# Patient Record
Sex: MALE | Race: WHITE | NOT HISPANIC OR LATINO | Employment: UNEMPLOYED | URBAN - METROPOLITAN AREA
[De-identification: names, ages, dates, MRNs, and addresses within clinical notes are randomized per-mention and may not be internally consistent; named-entity substitution may affect disease eponyms.]

---

## 2024-01-08 ENCOUNTER — APPOINTMENT (OUTPATIENT)
Dept: GENERAL RADIOLOGY | Facility: HOSPITAL | Age: 34
End: 2024-01-08
Payer: COMMERCIAL

## 2024-01-08 ENCOUNTER — APPOINTMENT (OUTPATIENT)
Dept: ULTRASOUND IMAGING | Facility: HOSPITAL | Age: 34
End: 2024-01-08
Payer: COMMERCIAL

## 2024-01-08 ENCOUNTER — HOSPITAL ENCOUNTER (INPATIENT)
Facility: HOSPITAL | Age: 34
LOS: 9 days | Discharge: HOME | End: 2024-01-17
Attending: EMERGENCY MEDICINE | Admitting: INTERNAL MEDICINE
Payer: COMMERCIAL

## 2024-01-08 ENCOUNTER — APPOINTMENT (OUTPATIENT)
Dept: CT IMAGING | Facility: HOSPITAL | Age: 34
End: 2024-01-08
Payer: COMMERCIAL

## 2024-01-08 DIAGNOSIS — F19.10 IV DRUG ABUSE: ICD-10-CM

## 2024-01-08 DIAGNOSIS — R07.9 CHEST PAIN, UNSPECIFIED TYPE: ICD-10-CM

## 2024-01-08 DIAGNOSIS — R50.9 FEVER, UNSPECIFIED FEVER CAUSE: ICD-10-CM

## 2024-01-08 DIAGNOSIS — J90 LOCULATED PLEURAL EFFUSION: ICD-10-CM

## 2024-01-08 DIAGNOSIS — J90 PLEURAL EFFUSION: Primary | ICD-10-CM

## 2024-01-08 PROBLEM — F19.90 IVDU (INTRAVENOUS DRUG USER): Status: ACTIVE | Noted: 2024-01-08

## 2024-01-08 LAB
ALBUMIN SERPL-MCNC: 3.9 G/DL (ref 3.5–5.2)
ALBUMIN/GLOB SERPL: 1 G/DL
ALP SERPL-CCNC: 89 U/L (ref 39–117)
ALT SERPL W P-5'-P-CCNC: 76 U/L (ref 1–41)
AMPHET+METHAMPHET UR QL: NEGATIVE
AMPHETAMINES UR QL: NEGATIVE
ANION GAP SERPL CALCULATED.3IONS-SCNC: 10 MMOL/L (ref 5–15)
APPEARANCE FLD: ABNORMAL
AST SERPL-CCNC: 31 U/L (ref 1–40)
B PARAPERT DNA SPEC QL NAA+PROBE: NOT DETECTED
B PERT DNA SPEC QL NAA+PROBE: NOT DETECTED
BARBITURATES UR QL SCN: NEGATIVE
BASOPHILS # BLD AUTO: 0.05 10*3/MM3 (ref 0–0.2)
BASOPHILS NFR BLD AUTO: 0.3 % (ref 0–1.5)
BENZODIAZ UR QL SCN: NEGATIVE
BILIRUB SERPL-MCNC: 0.6 MG/DL (ref 0–1.2)
BUN SERPL-MCNC: 18 MG/DL (ref 6–20)
BUN/CREAT SERPL: 22 (ref 7–25)
BUPRENORPHINE SERPL-MCNC: NEGATIVE NG/ML
C PNEUM DNA NPH QL NAA+NON-PROBE: NOT DETECTED
CALCIUM SPEC-SCNC: 9 MG/DL (ref 8.6–10.5)
CANNABINOIDS SERPL QL: NEGATIVE
CHLORIDE SERPL-SCNC: 100 MMOL/L (ref 98–107)
CO2 SERPL-SCNC: 26 MMOL/L (ref 22–29)
COCAINE UR QL: NEGATIVE
COLOR FLD: YELLOW
CREAT SERPL-MCNC: 0.82 MG/DL (ref 0.76–1.27)
D DIMER PPP FEU-MCNC: 1.62 MCGFEU/ML (ref 0–0.5)
D-LACTATE SERPL-SCNC: 1.2 MMOL/L (ref 0.5–2)
DEPRECATED RDW RBC AUTO: 39.9 FL (ref 37–54)
EGFRCR SERPLBLD CKD-EPI 2021: 119 ML/MIN/1.73
EOSINOPHIL # BLD AUTO: 0.17 10*3/MM3 (ref 0–0.4)
EOSINOPHIL NFR BLD AUTO: 0.9 % (ref 0.3–6.2)
ERYTHROCYTE [DISTWIDTH] IN BLOOD BY AUTOMATED COUNT: 13 % (ref 12.3–15.4)
FENTANYL UR-MCNC: POSITIVE NG/ML
FLUAV SUBTYP SPEC NAA+PROBE: NOT DETECTED
FLUBV RNA ISLT QL NAA+PROBE: NOT DETECTED
GEN 5 2HR TROPONIN T REFLEX: <6 NG/L
GLOBULIN UR ELPH-MCNC: 4 GM/DL
GLUCOSE SERPL-MCNC: 112 MG/DL (ref 65–99)
HADV DNA SPEC NAA+PROBE: NOT DETECTED
HCOV 229E RNA SPEC QL NAA+PROBE: NOT DETECTED
HCOV HKU1 RNA SPEC QL NAA+PROBE: NOT DETECTED
HCOV NL63 RNA SPEC QL NAA+PROBE: NOT DETECTED
HCOV OC43 RNA SPEC QL NAA+PROBE: NOT DETECTED
HCT VFR BLD AUTO: 39.4 % (ref 37.5–51)
HGB BLD-MCNC: 13.1 G/DL (ref 13–17.7)
HMPV RNA NPH QL NAA+NON-PROBE: NOT DETECTED
HOLD SPECIMEN: NORMAL
HPIV1 RNA ISLT QL NAA+PROBE: NOT DETECTED
HPIV2 RNA SPEC QL NAA+PROBE: NOT DETECTED
HPIV3 RNA NPH QL NAA+PROBE: NOT DETECTED
HPIV4 P GENE NPH QL NAA+PROBE: NOT DETECTED
IMM GRANULOCYTES # BLD AUTO: 0.09 10*3/MM3 (ref 0–0.05)
IMM GRANULOCYTES NFR BLD AUTO: 0.5 % (ref 0–0.5)
LDH SERPL-CCNC: 240 U/L (ref 135–225)
LIPASE SERPL-CCNC: 14 U/L (ref 13–60)
LYMPHOCYTES # BLD AUTO: 2.35 10*3/MM3 (ref 0.7–3.1)
LYMPHOCYTES NFR BLD AUTO: 13.1 % (ref 19.6–45.3)
LYMPHOCYTES NFR FLD MANUAL: 8 %
M PNEUMO IGG SER IA-ACNC: NOT DETECTED
MCH RBC QN AUTO: 28.2 PG (ref 26.6–33)
MCHC RBC AUTO-ENTMCNC: 33.2 G/DL (ref 31.5–35.7)
MCV RBC AUTO: 84.9 FL (ref 79–97)
METHADONE UR QL SCN: POSITIVE
MONOCYTES # BLD AUTO: 0.93 10*3/MM3 (ref 0.1–0.9)
MONOCYTES NFR BLD AUTO: 5.2 % (ref 5–12)
MONOCYTES NFR FLD: 11 %
MRSA DNA SPEC QL NAA+PROBE: POSITIVE
NEUTROPHILS NFR BLD AUTO: 14.38 10*3/MM3 (ref 1.7–7)
NEUTROPHILS NFR BLD AUTO: 80 % (ref 42.7–76)
NEUTROPHILS NFR FLD MANUAL: 81 %
NRBC BLD AUTO-RTO: 0 /100 WBC (ref 0–0.2)
NT-PROBNP SERPL-MCNC: 78.6 PG/ML (ref 0–450)
OPIATES UR QL: POSITIVE
OXYCODONE UR QL SCN: NEGATIVE
PCP UR QL SCN: NEGATIVE
PH FLD: 7.44 [PH]
PLATELET # BLD AUTO: 278 10*3/MM3 (ref 140–450)
PMV BLD AUTO: 10.6 FL (ref 6–12)
POTASSIUM SERPL-SCNC: 3.9 MMOL/L (ref 3.5–5.2)
PROCALCITONIN SERPL-MCNC: 0.21 NG/ML (ref 0–0.25)
PROT SERPL-MCNC: 7.9 G/DL (ref 6–8.5)
RBC # BLD AUTO: 4.64 10*6/MM3 (ref 4.14–5.8)
RBC # FLD AUTO: 2000 /MM3
RHINOVIRUS RNA SPEC NAA+PROBE: NOT DETECTED
RSV RNA NPH QL NAA+NON-PROBE: NOT DETECTED
SARS-COV-2 RNA NPH QL NAA+NON-PROBE: NOT DETECTED
SODIUM SERPL-SCNC: 136 MMOL/L (ref 136–145)
TRICYCLICS UR QL SCN: NEGATIVE
TROPONIN T DELTA: NORMAL
TROPONIN T SERPL HS-MCNC: <6 NG/L
WBC # FLD AUTO: 5886 /MM3
WBC NRBC COR # BLD AUTO: 17.97 10*3/MM3 (ref 3.4–10.8)
WHOLE BLOOD HOLD COAG: NORMAL
WHOLE BLOOD HOLD SPECIMEN: NORMAL

## 2024-01-08 PROCEDURE — 87186 SC STD MICRODIL/AGAR DIL: CPT | Performed by: NURSE PRACTITIONER

## 2024-01-08 PROCEDURE — 0W993ZZ DRAINAGE OF RIGHT PLEURAL CAVITY, PERCUTANEOUS APPROACH: ICD-10-PCS | Performed by: RADIOLOGY

## 2024-01-08 PROCEDURE — 87075 CULTR BACTERIA EXCEPT BLOOD: CPT | Performed by: INTERNAL MEDICINE

## 2024-01-08 PROCEDURE — 87040 BLOOD CULTURE FOR BACTERIA: CPT | Performed by: NURSE PRACTITIONER

## 2024-01-08 PROCEDURE — 71045 X-RAY EXAM CHEST 1 VIEW: CPT

## 2024-01-08 PROCEDURE — 82945 GLUCOSE OTHER FLUID: CPT | Performed by: INTERNAL MEDICINE

## 2024-01-08 PROCEDURE — 25010000002 MORPHINE PER 10 MG: Performed by: PHYSICIAN ASSISTANT

## 2024-01-08 PROCEDURE — 25010000002 KETOROLAC TROMETHAMINE PER 15 MG: Performed by: NURSE PRACTITIONER

## 2024-01-08 PROCEDURE — G0378 HOSPITAL OBSERVATION PER HR: HCPCS

## 2024-01-08 PROCEDURE — 36415 COLL VENOUS BLD VENIPUNCTURE: CPT

## 2024-01-08 PROCEDURE — 76942 ECHO GUIDE FOR BIOPSY: CPT

## 2024-01-08 PROCEDURE — 83690 ASSAY OF LIPASE: CPT | Performed by: EMERGENCY MEDICINE

## 2024-01-08 PROCEDURE — 99222 1ST HOSP IP/OBS MODERATE 55: CPT | Performed by: INTERNAL MEDICINE

## 2024-01-08 PROCEDURE — 87206 SMEAR FLUORESCENT/ACID STAI: CPT | Performed by: INTERNAL MEDICINE

## 2024-01-08 PROCEDURE — 83880 ASSAY OF NATRIURETIC PEPTIDE: CPT | Performed by: EMERGENCY MEDICINE

## 2024-01-08 PROCEDURE — 25810000003 SODIUM CHLORIDE 0.9 % SOLUTION

## 2024-01-08 PROCEDURE — 80053 COMPREHEN METABOLIC PANEL: CPT | Performed by: EMERGENCY MEDICINE

## 2024-01-08 PROCEDURE — 87070 CULTURE OTHR SPECIMN AEROBIC: CPT | Performed by: INTERNAL MEDICINE

## 2024-01-08 PROCEDURE — 87116 MYCOBACTERIA CULTURE: CPT | Performed by: INTERNAL MEDICINE

## 2024-01-08 PROCEDURE — 25810000003 SODIUM CHLORIDE 0.9 % SOLUTION: Performed by: NURSE PRACTITIONER

## 2024-01-08 PROCEDURE — 25010000002 CEFTRIAXONE PER 250 MG: Performed by: INTERNAL MEDICINE

## 2024-01-08 PROCEDURE — 83605 ASSAY OF LACTIC ACID: CPT | Performed by: NURSE PRACTITIONER

## 2024-01-08 PROCEDURE — 83986 ASSAY PH BODY FLUID NOS: CPT | Performed by: INTERNAL MEDICINE

## 2024-01-08 PROCEDURE — 84484 ASSAY OF TROPONIN QUANT: CPT | Performed by: EMERGENCY MEDICINE

## 2024-01-08 PROCEDURE — 87150 DNA/RNA AMPLIFIED PROBE: CPT | Performed by: NURSE PRACTITIONER

## 2024-01-08 PROCEDURE — 25010000002 MORPHINE PER 10 MG: Performed by: INTERNAL MEDICINE

## 2024-01-08 PROCEDURE — 25010000002 KETOROLAC TROMETHAMINE PER 15 MG: Performed by: INTERNAL MEDICINE

## 2024-01-08 PROCEDURE — 84145 PROCALCITONIN (PCT): CPT | Performed by: NURSE PRACTITIONER

## 2024-01-08 PROCEDURE — 80307 DRUG TEST PRSMV CHEM ANLYZR: CPT | Performed by: NURSE PRACTITIONER

## 2024-01-08 PROCEDURE — 83615 LACTATE (LD) (LDH) ENZYME: CPT | Performed by: INTERNAL MEDICINE

## 2024-01-08 PROCEDURE — 25510000001 IOPAMIDOL PER 1 ML: Performed by: EMERGENCY MEDICINE

## 2024-01-08 PROCEDURE — 85025 COMPLETE CBC W/AUTO DIFF WBC: CPT | Performed by: EMERGENCY MEDICINE

## 2024-01-08 PROCEDURE — 87102 FUNGUS ISOLATION CULTURE: CPT | Performed by: INTERNAL MEDICINE

## 2024-01-08 PROCEDURE — 87040 BLOOD CULTURE FOR BACTERIA: CPT | Performed by: INTERNAL MEDICINE

## 2024-01-08 PROCEDURE — 85379 FIBRIN DEGRADATION QUANT: CPT | Performed by: NURSE PRACTITIONER

## 2024-01-08 PROCEDURE — 71275 CT ANGIOGRAPHY CHEST: CPT

## 2024-01-08 PROCEDURE — 0202U NFCT DS 22 TRGT SARS-COV-2: CPT | Performed by: NURSE PRACTITIONER

## 2024-01-08 PROCEDURE — 25010000002 VANCOMYCIN 10 G RECONSTITUTED SOLUTION

## 2024-01-08 PROCEDURE — 99285 EMERGENCY DEPT VISIT HI MDM: CPT

## 2024-01-08 PROCEDURE — 93005 ELECTROCARDIOGRAM TRACING: CPT

## 2024-01-08 PROCEDURE — 87147 CULTURE TYPE IMMUNOLOGIC: CPT | Performed by: NURSE PRACTITIONER

## 2024-01-08 PROCEDURE — 84157 ASSAY OF PROTEIN OTHER: CPT | Performed by: INTERNAL MEDICINE

## 2024-01-08 PROCEDURE — 87015 SPECIMEN INFECT AGNT CONCNTJ: CPT | Performed by: INTERNAL MEDICINE

## 2024-01-08 PROCEDURE — 87205 SMEAR GRAM STAIN: CPT | Performed by: INTERNAL MEDICINE

## 2024-01-08 PROCEDURE — 87154 CUL TYP ID BLD PTHGN 6+ TRGT: CPT | Performed by: NURSE PRACTITIONER

## 2024-01-08 PROCEDURE — 93005 ELECTROCARDIOGRAM TRACING: CPT | Performed by: EMERGENCY MEDICINE

## 2024-01-08 PROCEDURE — 89051 BODY FLUID CELL COUNT: CPT | Performed by: INTERNAL MEDICINE

## 2024-01-08 PROCEDURE — 87641 MR-STAPH DNA AMP PROBE: CPT | Performed by: INTERNAL MEDICINE

## 2024-01-08 RX ORDER — SODIUM CHLORIDE 0.9 % (FLUSH) 0.9 %
10 SYRINGE (ML) INJECTION AS NEEDED
Status: DISCONTINUED | OUTPATIENT
Start: 2024-01-08 | End: 2024-01-09

## 2024-01-08 RX ORDER — ACETAMINOPHEN 160 MG/5ML
650 SOLUTION ORAL EVERY 4 HOURS PRN
Status: DISCONTINUED | OUTPATIENT
Start: 2024-01-08 | End: 2024-01-17 | Stop reason: HOSPADM

## 2024-01-08 RX ORDER — ACETAMINOPHEN 650 MG/1
650 SUPPOSITORY RECTAL EVERY 4 HOURS PRN
Status: DISCONTINUED | OUTPATIENT
Start: 2024-01-08 | End: 2024-01-17 | Stop reason: HOSPADM

## 2024-01-08 RX ORDER — LIDOCAINE HYDROCHLORIDE 10 MG/ML
10 INJECTION, SOLUTION EPIDURAL; INFILTRATION; INTRACAUDAL; PERINEURAL ONCE
Status: COMPLETED | OUTPATIENT
Start: 2024-01-08 | End: 2024-01-08

## 2024-01-08 RX ORDER — ACETAMINOPHEN 325 MG/1
650 TABLET ORAL EVERY 4 HOURS PRN
Status: DISCONTINUED | OUTPATIENT
Start: 2024-01-08 | End: 2024-01-17 | Stop reason: HOSPADM

## 2024-01-08 RX ORDER — NAPROXEN 500 MG/1
500 TABLET ORAL 2 TIMES DAILY WITH MEALS
Status: ON HOLD | COMMUNITY
End: 2024-01-17 | Stop reason: SDUPTHER

## 2024-01-08 RX ORDER — SODIUM CHLORIDE 9 MG/ML
40 INJECTION, SOLUTION INTRAVENOUS AS NEEDED
Status: DISCONTINUED | OUTPATIENT
Start: 2024-01-08 | End: 2024-01-17 | Stop reason: HOSPADM

## 2024-01-08 RX ORDER — CHOLECALCIFEROL (VITAMIN D3) 125 MCG
5 CAPSULE ORAL NIGHTLY PRN
Status: DISCONTINUED | OUTPATIENT
Start: 2024-01-08 | End: 2024-01-09

## 2024-01-08 RX ORDER — SODIUM CHLORIDE 0.9 % (FLUSH) 0.9 %
10 SYRINGE (ML) INJECTION AS NEEDED
Status: DISCONTINUED | OUTPATIENT
Start: 2024-01-08 | End: 2024-01-17 | Stop reason: HOSPADM

## 2024-01-08 RX ORDER — LORAZEPAM 1 MG/1
1 TABLET ORAL ONCE
Status: COMPLETED | OUTPATIENT
Start: 2024-01-08 | End: 2024-01-08

## 2024-01-08 RX ORDER — BISACODYL 10 MG
10 SUPPOSITORY, RECTAL RECTAL DAILY PRN
Status: DISCONTINUED | OUTPATIENT
Start: 2024-01-08 | End: 2024-01-11 | Stop reason: SDUPTHER

## 2024-01-08 RX ORDER — ASPIRIN 81 MG/1
324 TABLET, CHEWABLE ORAL ONCE
Status: COMPLETED | OUTPATIENT
Start: 2024-01-08 | End: 2024-01-08

## 2024-01-08 RX ORDER — NICOTINE 21 MG/24HR
1 PATCH, TRANSDERMAL 24 HOURS TRANSDERMAL DAILY PRN
Status: DISCONTINUED | OUTPATIENT
Start: 2024-01-08 | End: 2024-01-17 | Stop reason: HOSPADM

## 2024-01-08 RX ORDER — METHADONE HYDROCHLORIDE 10 MG/1
90 TABLET ORAL DAILY
COMMUNITY

## 2024-01-08 RX ORDER — BISACODYL 5 MG/1
5 TABLET, DELAYED RELEASE ORAL DAILY PRN
Status: DISCONTINUED | OUTPATIENT
Start: 2024-01-08 | End: 2024-01-11 | Stop reason: SDUPTHER

## 2024-01-08 RX ORDER — KETOROLAC TROMETHAMINE 30 MG/ML
15 INJECTION, SOLUTION INTRAMUSCULAR; INTRAVENOUS EVERY 6 HOURS PRN
Status: DISPENSED | OUTPATIENT
Start: 2024-01-08 | End: 2024-01-10

## 2024-01-08 RX ORDER — KETOROLAC TROMETHAMINE 15 MG/ML
15 INJECTION, SOLUTION INTRAMUSCULAR; INTRAVENOUS ONCE
Status: COMPLETED | OUTPATIENT
Start: 2024-01-08 | End: 2024-01-08

## 2024-01-08 RX ORDER — LIDOCAINE 40 MG/G
1 CREAM TOPICAL AS NEEDED
COMMUNITY
End: 2024-01-28

## 2024-01-08 RX ORDER — MORPHINE SULFATE 4 MG/ML
4 INJECTION, SOLUTION INTRAMUSCULAR; INTRAVENOUS EVERY 4 HOURS PRN
Status: DISCONTINUED | OUTPATIENT
Start: 2024-01-08 | End: 2024-01-11

## 2024-01-08 RX ORDER — METHADONE HYDROCHLORIDE 10 MG/1
90 TABLET ORAL DAILY
Status: DISCONTINUED | OUTPATIENT
Start: 2024-01-09 | End: 2024-01-11

## 2024-01-08 RX ORDER — POLYETHYLENE GLYCOL 3350 17 G/17G
17 POWDER, FOR SOLUTION ORAL DAILY PRN
Status: DISCONTINUED | OUTPATIENT
Start: 2024-01-08 | End: 2024-01-11 | Stop reason: SDUPTHER

## 2024-01-08 RX ORDER — AMOXICILLIN 250 MG
2 CAPSULE ORAL 2 TIMES DAILY
Status: DISCONTINUED | OUTPATIENT
Start: 2024-01-08 | End: 2024-01-11 | Stop reason: SDUPTHER

## 2024-01-08 RX ORDER — OXYCODONE HYDROCHLORIDE 5 MG/1
5 TABLET ORAL EVERY 6 HOURS PRN
Status: DISCONTINUED | OUTPATIENT
Start: 2024-01-08 | End: 2024-01-11

## 2024-01-08 RX ORDER — SODIUM CHLORIDE 0.9 % (FLUSH) 0.9 %
10 SYRINGE (ML) INJECTION EVERY 12 HOURS SCHEDULED
Status: DISCONTINUED | OUTPATIENT
Start: 2024-01-08 | End: 2024-01-17 | Stop reason: HOSPADM

## 2024-01-08 RX ADMIN — Medication 5 MG: at 23:20

## 2024-01-08 RX ADMIN — MORPHINE SULFATE 4 MG: 4 INJECTION, SOLUTION INTRAMUSCULAR; INTRAVENOUS at 14:48

## 2024-01-08 RX ADMIN — KETOROLAC TROMETHAMINE 15 MG: 30 INJECTION INTRAMUSCULAR; INTRAVENOUS at 23:20

## 2024-01-08 RX ADMIN — KETOROLAC TROMETHAMINE 15 MG: 30 INJECTION INTRAMUSCULAR; INTRAVENOUS at 16:24

## 2024-01-08 RX ADMIN — IOPAMIDOL 90 ML: 755 INJECTION, SOLUTION INTRAVENOUS at 12:44

## 2024-01-08 RX ADMIN — ASPIRIN 324 MG: 81 TABLET, CHEWABLE ORAL at 07:55

## 2024-01-08 RX ADMIN — SODIUM CHLORIDE 1000 ML: 9 INJECTION, SOLUTION INTRAVENOUS at 08:38

## 2024-01-08 RX ADMIN — LORAZEPAM 1 MG: 1 TABLET ORAL at 11:19

## 2024-01-08 RX ADMIN — LIDOCAINE HYDROCHLORIDE 10 ML: 10 INJECTION, SOLUTION EPIDURAL; INFILTRATION; INTRACAUDAL; PERINEURAL at 16:17

## 2024-01-08 RX ADMIN — VANCOMYCIN HYDROCHLORIDE 2750 MG: 10 INJECTION, POWDER, LYOPHILIZED, FOR SOLUTION INTRAVENOUS at 22:50

## 2024-01-08 RX ADMIN — KETOROLAC TROMETHAMINE 15 MG: 15 INJECTION, SOLUTION INTRAMUSCULAR; INTRAVENOUS at 08:41

## 2024-01-08 RX ADMIN — MORPHINE SULFATE 4 MG: 4 INJECTION, SOLUTION INTRAMUSCULAR; INTRAVENOUS at 20:53

## 2024-01-08 RX ADMIN — CEFTRIAXONE 2000 MG: 2 INJECTION, POWDER, FOR SOLUTION INTRAMUSCULAR; INTRAVENOUS at 20:53

## 2024-01-08 NOTE — PROGRESS NOTES
"Pharmacy Consult-Vancomycin Dosing  Lion Solis is a  33 y.o. male receiving vancomycin therapy.     Indication: Suspected bacteremia/endocarditis  Consulting Provider: Luis PACHECO Consult: No    Goal AUC: 400 - 600 mg/L*hr    Current Antimicrobial Therapy  Anti-Infectives (From admission, onward)      Ordered     Dose/Rate Route Frequency Start Stop    01/08/24 1504  vancomycin 2750 mg/500 mL 0.9% NS IVPB ([facility])        Ordering Provider: Marito Cross RPH    20 mg/kg × 132 kg  over 165 Minutes Intravenous Once 01/08/24 1800      01/08/24 1401  cefTRIAXone (ROCEPHIN) 2,000 mg in sodium chloride 0.9 % 100 mL IVPB        Ordering Provider: Luis Larson DO    2,000 mg  200 mL/hr over 30 Minutes Intravenous Every 24 Hours 01/08/24 1700 01/15/24 1659    01/08/24 1401  Pharmacy to dose vancomycin        Ordering Provider: Luis Larson DO     Does not apply Continuous PRN 01/08/24 1600 01/15/24 1559            Allergies  Allergies as of 01/08/2024    (No Known Allergies)       Labs    Results from last 7 days   Lab Units 01/08/24  0838   BUN mg/dL 18   CREATININE mg/dL 0.82       Results from last 7 days   Lab Units 01/08/24  0814   WBC 10*3/mm3 17.97*       Evaluation of Dosing     Last Dose Received in the ED/Outside Facility: N/A  Is Patient on Dialysis or Renal Replacement: No    Ht - 175.3 cm (69\")  Wt - 132 kg (290 lb)    Estimated Creatinine Clearance: 172.5 mL/min (by C-G formula based on SCr of 0.82 mg/dL).    Intake & Output (last 3 days)       None            Microbiology and Radiology  Microbiology Results (last 10 days)       Procedure Component Value - Date/Time    COVID PRE-OP / PRE-PROCEDURE SCREENING ORDER (NO ISOLATION) - Swab, Nasopharynx [[id number]]  (Normal) Collected: 01/08/24 0805    Lab Status: Final result Specimen: Swab from Nasopharynx Updated: 01/08/24 0915    Narrative:      The following orders were created for panel order COVID PRE-OP / PRE-PROCEDURE SCREENING ORDER " (NO ISOLATION) - Swab, Nasopharynx.  Procedure                               Abnormality         Status                     ---------                               -----------         ------                     Respiratory Panel PCR w/...[[id number]]  Normal              Final result                 Please view results for these tests on the individual orders.    Respiratory Panel PCR w/COVID-19(SARS-CoV-2) VERONICA/MARCELO/ISIDRO/PAD/COR/JOHN In-House, NP Swab in UTM/VTM, 2 HR TAT - Swab, Nasopharynx [[id number]]  (Normal) Collected: 01/08/24 0805    Lab Status: Final result Specimen: Swab from Nasopharynx Updated: 01/08/24 0915     ADENOVIRUS, PCR Not Detected     Coronavirus 229E Not Detected     Coronavirus HKU1 Not Detected     Coronavirus NL63 Not Detected     Coronavirus OC43 Not Detected     COVID19 Not Detected     Human Metapneumovirus Not Detected     Human Rhinovirus/Enterovirus Not Detected     Influenza A PCR Not Detected     Influenza B PCR Not Detected     Parainfluenza Virus 1 Not Detected     Parainfluenza Virus 2 Not Detected     Parainfluenza Virus 3 Not Detected     Parainfluenza Virus 4 Not Detected     RSV, PCR Not Detected     Bordetella pertussis pcr Not Detected     Bordetella parapertussis PCR Not Detected     Chlamydophila pneumoniae PCR Not Detected     Mycoplasma pneumo by PCR Not Detected    Narrative:      In the setting of a positive respiratory panel with a viral infection PLUS a negative procalcitonin without other underlying concern for bacterial infection, consider observing off antibiotics or discontinuation of antibiotics and continue supportive care. If the respiratory panel is positive for atypical bacterial infection (Bordetella pertussis, Chlamydophila pneumoniae, or Mycoplasma pneumoniae), consider antibiotic de-escalation to target atypical bacterial infection.            Reported Vancomycin Levels                     [employer] AUC Calculation:    Current AUC:   N/A mg/L*hr    Predicted  Steady State AUC on Current Dose: N/A mg/L*hr  _________________________________    Predicted Steady State AUC on New Dose:   448 mg/L*hr    Assessment/Plan:  Pharmacy to dose vancomycin for suspected bacteremia/endocarditis.  Will load with 2750 mg (~20.8 mg/kg), followed by 1250 mg (9.5 mg/kg) Q12H.    Pharmacy will follow patient status, labs, and new drug-drug interactions and adjust regimen accordingly.    Thanks,  Marito Cross, PharmD  1/8/2024 15:10 EST

## 2024-01-08 NOTE — ED NOTES
Lion Solis    Nursing Report ED to Floor:  Mental status: aox4  Ambulatory status: has not walked since being here  Oxygen Therapy:  2l nc  Cardiac Rhythm: nsr to tachy  Admitted from: er  Safety Concerns:  fall risk  Social Issues: IVDU  ED Room #:  5    ED Nurse Phone Extension - [phone] or may call [phone].      HPI:   Chief Complaint   Patient presents with    Chest Pain    Shortness of Breath       Past Medical History:  Past Medical History:   Diagnosis Date    MRSA infection         Past Surgical History:  History reviewed. No pertinent surgical history.     Admitting Doctor:   No admitting provider for patient encounter.    Consulting Provider(s):  Consults       No orders found from 12/10/2023 to 1/9/2024.             Admitting Diagnosis:   There were no encounter diagnoses.    Most Recent Vitals:   Vitals:    01/08/24 1145 01/08/24 1200 01/08/24 1215 01/08/24 1230   BP:       Pulse: 85 86 87 86   Resp:       Temp:       TempSrc:       SpO2: 90% 90% 92% 92%   Weight:       Height:           Active LDAs/IV Access:   Lines, Drains & Airways       Active LDAs       Name Placement date Placement time Site Days    Peripheral IV 01/08/24 0836 Anterior;Left Forearm 01/08/24  0836  Forearm  less than 1                    Labs (abnormal labs have a star):   Labs Reviewed   COMPREHENSIVE METABOLIC PANEL - Abnormal; Notable for the following components:       Result Value    Glucose 112 (*)     ALT (SGPT) 76 (*)     All other components within normal limits    Narrative:     GFR Normal >60  Chronic Kidney Disease <60  Kidney Failure <15     CBC WITH AUTO DIFFERENTIAL - Abnormal; Notable for the following components:    WBC 17.97 (*)     Neutrophil % 80.0 (*)     Lymphocyte % 13.1 (*)     Neutrophils, Absolute 14.38 (*)     Monocytes, Absolute 0.93 (*)     Immature Grans, Absolute 0.09 (*)     All other components within normal limits   D-DIMER, QUANTITATIVE - Abnormal; Notable for the following components:    D-Dimer,  "Quantitative 1.62 (*)     All other components within normal limits    Narrative:     According to the assay 's published package insert, a normal (<0.50 MCGFEU/mL) D-dimer result in conjunction with a non-high clinical probability assessment, excludes deep vein thrombosis (DVT) and pulmonary embolism (PE) with high sensitivity.    D-dimer values increase with age and this can make VTE exclusion of an older population difficult. To address this, the [employer], based on best available evidence and recent guidelines, recommends that clinicians use age-adjusted D-dimer thresholds in patients greater than 50 years of age with: a) a low probability of PE who do not meet all Pulmonary Embolism Rule Out Criteria, or b) in those with intermediate probability of PE.   The formula for an age-adjusted D-dimer cut-off is \"age/100\".  For example, a 60 year old patient would have an age-adjusted cut-off of 0.60 MCGFEU/mL and an 80 year old 0.80 MCGFEU/mL.   URINE DRUG SCREEN - Abnormal; Notable for the following components:    Opiate Screen Positive (*)     Methadone Screen, Urine Positive (*)     All other components within normal limits    Narrative:     Cutoff For Drugs Screened:    Amphetamines               500 ng/ml  Barbiturates               200 ng/ml  Benzodiazepines            150 ng/ml  Cocaine                    150 ng/ml  Methadone                  200 ng/ml  Opiates                    100 ng/ml  Phencyclidine               25 ng/ml  THC                         50 ng/ml  Methamphetamine            500 ng/ml  Tricyclic Antidepressants  300 ng/ml  Oxycodone                  100 ng/ml  Buprenorphine               10 ng/ml    The normal value for all drugs tested is negative. This report includes unconfirmed screening results, with the cutoff values listed, to be used for medical treatment purposes only.  Unconfirmed results must not be used for non-medical purposes such as employment or " legal testing.  Clinical consideration should be applied to any drug of abuse test, particularly when unconfirmed results are used.     RESPIRATORY PANEL PCR W/ COVID-19 (SARS-COV-2), NP SWAB IN UTM/VTP, 2 HR TAT - Normal    Narrative:     In the setting of a positive respiratory panel with a viral infection PLUS a negative procalcitonin without other underlying concern for bacterial infection, consider observing off antibiotics or discontinuation of antibiotics and continue supportive care. If the respiratory panel is positive for atypical bacterial infection (Bordetella pertussis, Chlamydophila pneumoniae, or Mycoplasma pneumoniae), consider antibiotic de-escalation to target atypical bacterial infection.   TROPONIN - Normal    Narrative:     High Sensitive Troponin T Reference Range:  <14.0 ng/L- Negative Female for AMI  <22.0 ng/L- Negative Male for AMI  >=14 - Abnormal Female indicating possible myocardial injury.  >=22 - Abnormal Male indicating possible myocardial injury.   Clinicians would have to utilize clinical acumen, EKG, Troponin, and serial changes to determine if it is an Acute Myocardial Infarction or myocardial injury due to an underlying chronic condition.        LIPASE - Normal   BNP (IN-HOUSE) - Normal    Narrative:     This assay is used as an aid in the diagnosis of individuals suspected of having heart failure. It can be used as an aid in the diagnosis of acute decompensated heart failure (ADHF) in patients presenting with signs and symptoms of ADHF to the emergency department (ED). In addition, NT-proBNP of <300 pg/mL indicates ADHF is not likely.    Age Range Result Interpretation  NT-proBNP Concentration (pg/mL:      <50             Positive            >450                   Gray                 300-450                    Negative             <300    50-75           Positive            >900                  Gray                300-900                  Negative            <300      >75       "       Positive            >1800                  Gray                300-1800                  Negative            <300   LACTIC ACID, PLASMA - Normal   PROCALCITONIN - Normal    Narrative:     As a Marker for Sepsis (Non-Neonates):    1. <0.5 ng/mL represents a low risk of severe sepsis and/or septic shock.  2. >2 ng/mL represents a high risk of severe sepsis and/or septic shock.    As a Marker for Lower Respiratory Tract Infections that require antibiotic therapy:    PCT on Admission    Antibiotic Therapy       6-12 Hrs later    >0.5                Strongly Recommended  >0.25 - <0.5        Recommended   0.1 - 0.25          Discouraged              Remeasure/reassess PCT  <0.1                Strongly Discouraged     Remeasure/reassess PCT    As 28 day mortality risk marker: \"Change in Procalcitonin Result\" (>80% or <=80%) if Day 0 (or Day 1) and Day 4 values are available. Refer to http://www.[employer]pct-calculator.com    Change in PCT <=80%  A decrease of PCT levels below or equal to 80% defines a positive change in PCT test result representing a higher risk for 28-day all-cause mortality of patients diagnosed with severe sepsis for septic shock.    Change in PCT >80%  A decrease of PCT levels of more than 80% defines a negative change in PCT result representing a lower risk for 28-day all-cause mortality of patients diagnosed with severe sepsis or septic shock.      COVID PRE-OP / PRE-PROCEDURE SCREENING ORDER (NO ISOLATION)    Narrative:     The following orders were created for panel order COVID PRE-OP / PRE-PROCEDURE SCREENING ORDER (NO ISOLATION) - Swab, Nasopharynx.  Procedure                               Abnormality         Status                     ---------                               -----------         ------                     Respiratory Panel PCR w/...[[id number]]  Normal              Final result                 Please view results for these tests on the individual orders.   BLOOD CULTURE "   BLOOD CULTURE   RAINBOW DRAW    Narrative:     The following orders were created for panel order Drew Draw.  Procedure                               Abnormality         Status                     ---------                               -----------         ------                     Green Top (Gel)[[id number]]                                  Final result               Lavender Top[[id number]]                                     Final result               Gold Top - SST[[id number]]                                   Final result               Rain Top[[id number]]                                         Final result               Light Blue Top[[id number]]                                   Final result                 Please view results for these tests on the individual orders.   HIGH SENSITIVITIY TROPONIN T 2HR    Narrative:     High Sensitive Troponin T Reference Range:  <14.0 ng/L- Negative Female for AMI  <22.0 ng/L- Negative Male for AMI  >=14 - Abnormal Female indicating possible myocardial injury.  >=22 - Abnormal Male indicating possible myocardial injury.   Clinicians would have to utilize clinical acumen, EKG, Troponin, and serial changes to determine if it is an Acute Myocardial Infarction or myocardial injury due to an underlying chronic condition.        FENTANYL, URINE   PROTIME-INR   CBC AND DIFFERENTIAL    Narrative:     The following orders were created for panel order CBC & Differential.  Procedure                               Abnormality         Status                     ---------                               -----------         ------                     CBC Auto Differential[[id number]]        Abnormal            Final result                 Please view results for these tests on the individual orders.   GREEN TOP   LAVENDER TOP   GOLD TOP - SST   GRAY TOP   LIGHT BLUE TOP       Meds Given in ED:   Medications   sodium chloride 0.9 % flush 10 mL (has no administration in time range)   sodium  chloride 0.9 % flush 10 mL (has no administration in time range)   Hold medication (has no administration in time range)   aspirin chewable tablet 324 mg (324 mg Oral Given 1/8/24 0755)   sodium chloride 0.9 % bolus 1,000 mL (0 mL Intravenous Stopped 1/8/24 1030)   ketorolac (TORADOL) injection 15 mg (15 mg Intravenous Given 1/8/24 0841)   LORazepam (ATIVAN) tablet 1 mg (1 mg Oral Given 1/8/24 1119)   iopamidol (ISOVUE-370) 76 % injection 100 mL (90 mL Intravenous Given 1/8/24 1244)     hold, 1 each

## 2024-01-08 NOTE — H&P
"    [facility] Medicine Services  HISTORY AND PHYSICAL    Patient Name: Lion Solis  : 1990  MRN: [id number]  Primary Care Physician: Provider, No Known  Date of admission: 2024      Subjective   Subjective     Chief Complaint:  Chest pain    HPI:  Lion Solis is a 33 y.o. male w/ obesity, chronic methadone use, IVDU w/ fentanyl (occasionally re-uses needles but does not share needles), vaping (commercially bought nicotine products) who presents for eval of right sided chest pain. He reports recent multiple weeks of upper respiratory illness that resolved ~10 days ago. Then ~5 days ago he developed right sided chest pain, was seen at [facility] w/ temp of 101 orally, was diagnosed w/ a muscle strain, and was given a \"steroid shot\" and naproxen. His pain worsened 2-3 days ago and has become so severe he presented to the ED today. He has RT sided sharp chest pain worse with deep breathing, no sputum production, but does have associated intermittent sweats.      Personal History     Past Medical History:   Diagnosis Date    MRSA infection            History reviewed. No pertinent surgical history.    Family History: family history is not on file.     Social History:  reports that he has been smoking electronic cigarette. He has never used smokeless tobacco. He reports current drug use. Drug: IV.  Social History     Social History Narrative    Not on file       Medications:  Available home medication information reviewed.  lidocaine, methadone, and naproxen    No Known Allergies    Objective   Objective     Vital Signs:   Temp:  [99.5 °F (37.5 °C)] 99.5 °F (37.5 °C)  Heart Rate:  [] 86  Resp:  [24] 24  BP: (127-145)/(70-89) 128/70  Flow (L/min):  [2] 2       Physical Exam   Constitutional: Awake, alert, laying in bed in NAD, appears uncomfortable  HENT: NCAT, mucous membranes moist  Respiratory: Clear to auscultation bilaterally, shallow breathing   Cardiovascular: RRR, no murmurs " appreciated  Gastrointestinal: Positive bowel sounds, soft, nontender, nondistended  Musculoskeletal: No LE edema  Psychiatric: Appropriate affect, cooperative  Neurologic: Oriented x 3, moving all extremities equally    Result Review:  I have personally reviewed the results from the time of this admission to 1/8/2024 14:01 EST and agree with these findings:  []  Laboratory list / accordion  []  Microbiology  [x]  Radiology  []  EKG/Telemetry   []  Cardiology/Vascular   []  Pathology  []  Old records  []  Other:  Most notable findings include: CT chest w/ loculated effusions      LAB RESULTS:      Lab 01/08/24  0838 01/08/24  0828 01/08/24 0814   WBC  --   --  17.97*   HEMOGLOBIN  --   --  13.1   HEMATOCRIT  --   --  39.4   PLATELETS  --   --  278   NEUTROS ABS  --   --  14.38*   IMMATURE GRANS (ABS)  --   --  0.09*   LYMPHS ABS  --   --  2.35   MONOS ABS  --   --  0.93*   EOS ABS  --   --  0.17   MCV  --   --  84.9   PROCALCITONIN 0.21  --   --    LACTATE  --  1.2  --    D DIMER QUANT  --   --  1.62*         Lab 01/08/24 0838   SODIUM 136   POTASSIUM 3.9   CHLORIDE 100   CO2 26.0   ANION GAP 10.0   BUN 18   CREATININE 0.82   EGFR 119.0   GLUCOSE 112*   CALCIUM 9.0         Lab 01/08/24 0838   TOTAL PROTEIN 7.9   ALBUMIN 3.9   GLOBULIN 4.0   ALT (SGPT) 76*   AST (SGOT) 31   BILIRUBIN 0.6   ALK PHOS 89   LIPASE 14         Lab 01/08/24  1038 01/08/24 0838   PROBNP  --  78.6   HSTROP T <6 <6                     Microbiology Results (last 10 days)       Procedure Component Value - Date/Time    COVID PRE-OP / PRE-PROCEDURE SCREENING ORDER (NO ISOLATION) - Swab, Nasopharynx [[id number]]  (Normal) Collected: 01/08/24 0805    Lab Status: Final result Specimen: Swab from Nasopharynx Updated: 01/08/24 0915    Narrative:      The following orders were created for panel order COVID PRE-OP / PRE-PROCEDURE SCREENING ORDER (NO ISOLATION) - Swab, Nasopharynx.  Procedure                               Abnormality         Status                      ---------                               -----------         ------                     Respiratory Panel PCR w/...[[id number]]  Normal              Final result                 Please view results for these tests on the individual orders.    Respiratory Panel PCR w/COVID-19(SARS-CoV-2) VERONICA/MARCELO/ISIDRO/PAD/COR/JOHN In-House, NP Swab in UTM/VTM, 2 HR TAT - Swab, Nasopharynx [[id number]]  (Normal) Collected: 01/08/24 0805    Lab Status: Final result Specimen: Swab from Nasopharynx Updated: 01/08/24 0915     ADENOVIRUS, PCR Not Detected     Coronavirus 229E Not Detected     Coronavirus HKU1 Not Detected     Coronavirus NL63 Not Detected     Coronavirus OC43 Not Detected     COVID19 Not Detected     Human Metapneumovirus Not Detected     Human Rhinovirus/Enterovirus Not Detected     Influenza A PCR Not Detected     Influenza B PCR Not Detected     Parainfluenza Virus 1 Not Detected     Parainfluenza Virus 2 Not Detected     Parainfluenza Virus 3 Not Detected     Parainfluenza Virus 4 Not Detected     RSV, PCR Not Detected     Bordetella pertussis pcr Not Detected     Bordetella parapertussis PCR Not Detected     Chlamydophila pneumoniae PCR Not Detected     Mycoplasma pneumo by PCR Not Detected    Narrative:      In the setting of a positive respiratory panel with a viral infection PLUS a negative procalcitonin without other underlying concern for bacterial infection, consider observing off antibiotics or discontinuation of antibiotics and continue supportive care. If the respiratory panel is positive for atypical bacterial infection (Bordetella pertussis, Chlamydophila pneumoniae, or Mycoplasma pneumoniae), consider antibiotic de-escalation to target atypical bacterial infection.            CT Angiogram Chest    Result Date: 1/8/2024  CT ANGIOGRAM CHEST Date of Exam: 1/8/2024 12:29 PM EST Indication: chest pain, SOA, elevated d-dimer. r/o PE, infection. known IV drug user. Comparison: None available. Technique:  CTA of the chest was performed after the uneventful intravenous administration of 85 mL Isovue-370. Reconstructed coronal and sagittal images were also obtained. In addition, a 3-D volume rendered image was created for interpretation. Automated exposure control and iterative reconstruction methods were used. Findings: There is relatively more dense contrast in the SVC and thoracic aorta as compared to the pulmonary arteries and their branches. No large central embolism is identified although peripheral branches are not adequately evaluated on this exam. There are no enlarged mediastinal nodes. There are calcified hilar nodes. There is a small partially loculated right pleural effusion. There is no left pleural effusion. There is compressive atelectasis of portions of the right middle and right lower lobes. There is triangular atelectasis of the left lower lobe.     Impression: Impression: Exam is not adequate for detection of pulmonary embolism. Repeat scanning should be considered if clinically warranted. Small loculated right pleural effusion. Areas of compressive atelectasis of the right middle and right lower lobes, and mild atelectasis of the left lower lobe. Electronically Signed: Reyna Caldwell MD  1/8/2024 12:55 PM EST  Workstation ID: [id number]    XR Chest 1 View    Result Date: 1/8/2024  XR CHEST 1 VW Date of Exam: 1/8/2024 8:26 AM EST Indication: Chest Pain Triage Protocol Comparison: None available. Findings: Lung volumes are diminished and there are perihilar interstitial opacities suggesting pulmonary edema pattern. Small bilateral pleural effusions are evident, greater on the right. There is no distinct pneumothorax. The heart appears mildly enlarged.     Impression: Impression: Hypoventilatory changes and evidence of likely pulmonary edema with small bilateral pleural effusions. Electronically Signed: Gio Marte MD  1/8/2024 8:47 AM EST  Workstation ID: [id number]         Assessment & Plan    Assessment & Plan       Loculated pleural effusion    IVDU (intravenous drug user)    Summary: This is a 32 y/o male w/ obesity, chronic methadone use (follows w/ clinic), IVDU w/ fentanyl, vaping, who presented w/ RT sided pleuritic chest pain and recent fever 101F orally, imaging was concerning for loculated RT pleural effusions    Assessment/Plan    RT pleuritic chest pain w/ loculated effusions  Concern for bacteremia/endocarditis  -HS troponins negative, lipase WNL, CTA w/o large vessel PE (cannot rule out smaller vessels due to contrast timing)  -sometimes re-uses needles but does not share needles  -blood cultures obtained in the ED, will obtain another set later today  -IR guided thoracentesis ordered for today w/ cultures  -check HIV status  -check TTE  -leukocytosis difficult to interpret w/ recent parenteral steroid at [facility]  -start Vanc/CTX, timed for after thoracentesis to maximize culture data (he is non-toxic currently)  -ordered home methadone 90mg daily (pharmacy working to verify), pain control will be difficult, prn toradol, oxycodone, morphine  -if blood cultures positive or TTE shows evidence for endocarditis, anticipate ID consult    Chronic methadone use  IVDU w/ fentanyl  -home methadone 90mg as noted (follows with a clinic), opiate addiction consult for in hospital assistance, other pain control as noted    Nicotine use  -vapes commercially produced nicotine products, ordered NRT while inpatient    DVT prophylaxis:    DVT prophylaxis:  No DVT prophylaxis order currently exists.      CODE STATUS:    Code Status and Medical Interventions:   Ordered at: 01/08/24 1355     Code Status (Patient has no pulse and is not breathing):    CPR (Attempt to Resuscitate)     Medical Interventions (Patient has pulse or is breathing):    Full Support       Expected Discharge   Expected Discharge Date: 1/12/2024; Expected Discharge Time:      Luis Larson DO  01/08/24

## 2024-01-08 NOTE — ED PROVIDER NOTES
EMERGENCY DEPARTMENT ENCOUNTER    Pt Name: Lion Solis  MRN: [id number]  Pt :   1990  Room Number:  N206/1  Date of encounter:  2024  PCP: Provider, No Known  ED Provider: FARRAH Mays    Historian: Patient    HPI:  Chief Complaint: chest pain    Context: Lion Solis is a 33 y.o. male who presents to the ED c/o chest pain.  Patient reports that the pain in his chest started 3 days ago.  Patient thought he had pulled a muscle.  Patient was seen at the urgent care and given Toradol.  Toradol helped initially.  He was discharged home with Tylenol and lidocaine patches.  Patient has had intermittent fevers.  Fevers up to 102.  He has had a cough over the past 3 to 4 weeks.  He has had intermittent episodes of shortness of breath.  He complains of pain on the right side of his chest that radiates directly into his back.  Patient is an IV drug abuser.  He denies any cardiac history.  He reports he has never had a stress test or cardiac cath.  He denies any recent travel or recent immobilization.  HPI     REVIEW OF SYSTEMS  A chief complaint appropriate review of systems was completed and is negative except as noted in the HPI.     PAST MEDICAL HISTORY  Past Medical History:   Diagnosis Date    MRSA infection        PAST SURGICAL HISTORY  Past Surgical History:   Procedure Laterality Date    BRONCHOSCOPY THORACOTOMY Right 2024    Procedure: THORACOTOMY WITH DECORTICATION;  Surgeon: Douglas Vargas MD;  Location: [facility];  Service: Cardiothoracic;  Laterality: Right;       FAMILY HISTORY  History reviewed. No pertinent family history.    SOCIAL HISTORY  Social History     Socioeconomic History    Marital status:    Tobacco Use    Smoking status: Every Day     Types: Electronic Cigarette    Smokeless tobacco: Never   Vaping Use    Vaping Use: Unknown   Substance and Sexual Activity    Drug use: Yes     Types: IV     Comment: fentanyl       ALLERGIES  Patient has no known  allergies.    PHYSICAL EXAM  Physical Exam  Vitals and nursing note reviewed.   Constitutional:       General: He is in acute distress.      Appearance: He is obese. He is ill-appearing.   HENT:      Head: Normocephalic and atraumatic.   Eyes:      Pupils: Pupils are equal, round, and reactive to light.   Cardiovascular:      Rate and Rhythm: Regular rhythm. Tachycardia present.      Heart sounds: Normal heart sounds.   Pulmonary:      Breath sounds: No decreased breath sounds, wheezing or rhonchi.   Chest:      Chest wall: Tenderness (rt side of chest) present.   Musculoskeletal:         General: Normal range of motion.      Cervical back: Normal range of motion.      Right lower leg: No tenderness. No edema.      Left lower leg: No tenderness. No edema.   Skin:     General: Skin is warm and dry.   Neurological:      General: No focal deficit present.      Mental Status: He is alert and oriented to person, place, and time.   Psychiatric:         Mood and Affect: Mood is anxious.           LAB RESULTS  Results for orders placed or performed during the hospital encounter of 01/08/24   Blood Culture - Blood, Arm, Left    Specimen: Arm, Left; Blood   Result Value Ref Range    Blood Culture Staphylococcus epidermidis (C)     Isolated from Anaerobic Bottle     Gram Stain Anaerobic Bottle Gram positive cocci in clusters (C)        Susceptibility    Staphylococcus epidermidis - JUANJO     Oxacillin  Susceptible ug/ml     Vancomycin  Susceptible ug/ml   Blood Culture - Blood, Arm, Left    Specimen: Arm, Left; Blood   Result Value Ref Range    Blood Culture No growth at 5 days    Respiratory Panel PCR w/COVID-19(SARS-CoV-2) VERONICA/MARCELO/ISIDRO/PAD/COR/JOHN In-House, NP Swab in UTM/VTM, 2 HR TAT - Swab, Nasopharynx    Specimen: Nasopharynx; Swab   Result Value Ref Range    ADENOVIRUS, PCR Not Detected Not Detected    Coronavirus 229E Not Detected Not Detected    Coronavirus HKU1 Not Detected Not Detected    Coronavirus NL63 Not Detected  Not Detected    Coronavirus OC43 Not Detected Not Detected    COVID19 Not Detected Not Detected - Ref. Range    Human Metapneumovirus Not Detected Not Detected    Human Rhinovirus/Enterovirus Not Detected Not Detected    Influenza A PCR Not Detected Not Detected    Influenza B PCR Not Detected Not Detected    Parainfluenza Virus 1 Not Detected Not Detected    Parainfluenza Virus 2 Not Detected Not Detected    Parainfluenza Virus 3 Not Detected Not Detected    Parainfluenza Virus 4 Not Detected Not Detected    RSV, PCR Not Detected Not Detected    Bordetella pertussis pcr Not Detected Not Detected    Bordetella parapertussis PCR Not Detected Not Detected    Chlamydophila pneumoniae PCR Not Detected Not Detected    Mycoplasma pneumo by PCR Not Detected Not Detected   MRSA Screen, PCR (Inpatient) - Swab, Nares    Specimen: Nares; Swab   Result Value Ref Range    MRSA PCR Positive (A) Negative   Blood Culture - Blood, Arm, Right    Specimen: Arm, Right; Blood   Result Value Ref Range    Blood Culture No growth at 4 days    Blood Culture - Blood, Hand, Left    Specimen: Hand, Left; Blood   Result Value Ref Range    Blood Culture No growth at 4 days    AFB Culture - Body Fluid, Pleural Cavity    Specimen: Pleural Cavity; Body Fluid   Result Value Ref Range    AFB Stain No acid fast bacilli seen on concentrated smear    Body Fluid Culture - Body Fluid, Pleural Cavity    Specimen: Pleural Cavity; Body Fluid   Result Value Ref Range    Body Fluid Culture No growth at 3 days     Gram Stain Few (2+) WBCs seen     Gram Stain Few (2+) Red blood cells     Gram Stain No organisms seen    Anaerobic Culture - Pleural Fluid, Pleural Cavity    Specimen: Pleural Cavity; Pleural Fluid   Result Value Ref Range    Anaerobic Culture No anaerobes isolated at 5 days    Fungus Smear - Body Fluid, Pleural Cavity    Specimen: Pleural Cavity; Body Fluid   Result Value Ref Range    Fungal Stain No fungal elements seen    Blood Culture ID, PCR -  Blood, Arm, Left    Specimen: Arm, Left; Blood   Result Value Ref Range    BCID, PCR (A) Negative by BCID PCR. Culture to Follow.     Staph spp, not aureus or lugdunensis. Identification by BCID2 PCR.    BOTTLE TYPE Anaerobic Bottle    Tissue / Bone Culture - Tissue, Pleural Cavity    Specimen: Pleural Cavity; Tissue   Result Value Ref Range    Tissue Culture No growth at 2 days     Gram Stain Few (2+) WBCs seen     Gram Stain No organisms seen    AFB Culture - Tissue, Pleural Cavity    Specimen: Pleural Cavity; Tissue   Result Value Ref Range    AFB Stain No acid fast bacilli seen on concentrated smear    High Sensitivity Troponin T    Specimen: Blood   Result Value Ref Range    HS Troponin T <6 <22 ng/L   Comprehensive Metabolic Panel    Specimen: Blood   Result Value Ref Range    Glucose 112 (H) 65 - 99 mg/dL    BUN 18 6 - 20 mg/dL    Creatinine 0.82 0.76 - 1.27 mg/dL    Sodium 136 136 - 145 mmol/L    Potassium 3.9 3.5 - 5.2 mmol/L    Chloride 100 98 - 107 mmol/L    CO2 26.0 22.0 - 29.0 mmol/L    Calcium 9.0 8.6 - 10.5 mg/dL    Total Protein 7.9 6.0 - 8.5 g/dL    Albumin 3.9 3.5 - 5.2 g/dL    ALT (SGPT) 76 (H) 1 - 41 U/L    AST (SGOT) 31 1 - 40 U/L    Alkaline Phosphatase 89 39 - 117 U/L    Total Bilirubin 0.6 0.0 - 1.2 mg/dL    Globulin 4.0 gm/dL    A/G Ratio 1.0 g/dL    BUN/Creatinine Ratio 22.0 7.0 - 25.0    Anion Gap 10.0 5.0 - 15.0 mmol/L    eGFR 119.0 >60.0 mL/min/1.73   Lipase    Specimen: Blood   Result Value Ref Range    Lipase 14 13 - 60 U/L   BNP    Specimen: Blood   Result Value Ref Range    proBNP 78.6 0.0 - 450.0 pg/mL   CBC Auto Differential    Specimen: Blood   Result Value Ref Range    WBC 17.97 (H) 3.40 - 10.80 10*3/mm3    RBC 4.64 4.14 - 5.80 10*6/mm3    Hemoglobin 13.1 13.0 - 17.7 g/dL    Hematocrit 39.4 37.5 - 51.0 %    MCV 84.9 79.0 - 97.0 fL    MCH 28.2 26.6 - 33.0 pg    MCHC 33.2 31.5 - 35.7 g/dL    RDW 13.0 12.3 - 15.4 %    RDW-SD 39.9 37.0 - 54.0 fl    MPV 10.6 6.0 - 12.0 fL    Platelets  278 140 - 450 10*3/mm3    Neutrophil % 80.0 (H) 42.7 - 76.0 %    Lymphocyte % 13.1 (L) 19.6 - 45.3 %    Monocyte % 5.2 5.0 - 12.0 %    Eosinophil % 0.9 0.3 - 6.2 %    Basophil % 0.3 0.0 - 1.5 %    Immature Grans % 0.5 0.0 - 0.5 %    Neutrophils, Absolute 14.38 (H) 1.70 - 7.00 10*3/mm3    Lymphocytes, Absolute 2.35 0.70 - 3.10 10*3/mm3    Monocytes, Absolute 0.93 (H) 0.10 - 0.90 10*3/mm3    Eosinophils, Absolute 0.17 0.00 - 0.40 10*3/mm3    Basophils, Absolute 0.05 0.00 - 0.20 10*3/mm3    Immature Grans, Absolute 0.09 (H) 0.00 - 0.05 10*3/mm3    nRBC 0.0 0.0 - 0.2 /100 WBC   D-dimer, Quantitative    Specimen: Blood   Result Value Ref Range    D-Dimer, Quantitative 1.62 (H) 0.00 - 0.50 MCGFEU/mL   Lactic Acid, Plasma    Specimen: Blood   Result Value Ref Range    Lactate 1.2 0.5 - 2.0 mmol/L   Urine Drug Screen - Urine, Clean Catch    Specimen: Urine, Clean Catch   Result Value Ref Range    THC, Screen, Urine Negative Negative    Phencyclidine (PCP), Urine Negative Negative    Cocaine Screen, Urine Negative Negative    Methamphetamine, Ur Negative Negative    Opiate Screen Positive (A) Negative    Amphetamine Screen, Urine Negative Negative    Benzodiazepine Screen, Urine Negative Negative    Tricyclic Antidepressants Screen Negative Negative    Methadone Screen, Urine Positive (A) Negative    Barbiturates Screen, Urine Negative Negative    Oxycodone Screen, Urine Negative Negative    Buprenorphine, Screen, Urine Negative Negative   Procalcitonin    Specimen: Blood   Result Value Ref Range    Procalcitonin 0.21 0.00 - 0.25 ng/mL   High Sensitivity Troponin T 2Hr    Specimen: Blood   Result Value Ref Range    HS Troponin T <6 <22 ng/L    Troponin T Delta     Fentanyl, Urine - Urine, Clean Catch    Specimen: Urine, Clean Catch   Result Value Ref Range    Fentanyl, Urine Positive (A) Negative   Protime-INR    Specimen: Blood   Result Value Ref Range    Protime 15.3 (H) 12.2 - 14.5 Seconds    INR 1.19 (H) 0.89 - 1.12    HIV-1 / O / 2 Ag / Antibody    Specimen: Blood   Result Value Ref Range    HIV-1/ HIV-2 Non-Reactive Non-Reactive   pH, Body Fluid - Pleural Fluid, Pleural Cavity    Specimen: Pleural Cavity; Pleural Fluid   Result Value Ref Range    pH, Fluid 7.44    Lactate Dehydrogenase, Body Fluid - Pleural Fluid, Pleural Cavity    Specimen: Pleural Cavity; Pleural Fluid   Result Value Ref Range    Lactate Dehydrogenase (LD), Fluid 809 U/L   Protein, Body Fluid - Pleural Fluid, Pleural Cavity    Specimen: Pleural Cavity; Pleural Fluid   Result Value Ref Range    Protein, Total, Fluid 5.9 g/dL   Glucose, Body Fluid - Pleural Fluid, Pleural Cavity    Specimen: Pleural Cavity; Pleural Fluid   Result Value Ref Range    Glucose, Fluid 61 mg/dL   Body fluid cell count - Pleural Fluid, Pleural Cavity    Specimen: Pleural Cavity; Pleural Fluid   Result Value Ref Range    Color, Fluid Yellow     Appearance, Fluid Cloudy (A) Clear    WBC, Fluid 5,886 /mm3    RBC, Fluid 2,000   /mm3   Lactate Dehydrogenase    Specimen: Blood   Result Value Ref Range     (H) 135 - 225 U/L   Body fluid differential - Pleural Fluid, Pleural Cavity    Specimen: Pleural Cavity; Pleural Fluid   Result Value Ref Range    Neutrophils, Fluid 81 %    Lymphocytes, Fluid 8 %    Monocytes, Fluid 11 %   Comprehensive Metabolic Panel    Specimen: Blood   Result Value Ref Range    Glucose 114 (H) 65 - 99 mg/dL    BUN 13 6 - 20 mg/dL    Creatinine 0.67 (L) 0.76 - 1.27 mg/dL    Sodium 131 (L) 136 - 145 mmol/L    Potassium 4.2 3.5 - 5.2 mmol/L    Chloride 98 98 - 107 mmol/L    CO2 23.0 22.0 - 29.0 mmol/L    Calcium 8.8 8.6 - 10.5 mg/dL    Total Protein 7.6 6.0 - 8.5 g/dL    Albumin 3.5 3.5 - 5.2 g/dL    ALT (SGPT) 52 (H) 1 - 41 U/L    AST (SGOT) 22 1 - 40 U/L    Alkaline Phosphatase 117 39 - 117 U/L    Total Bilirubin 1.0 0.0 - 1.2 mg/dL    Globulin 4.1 gm/dL    A/G Ratio 0.9 g/dL    BUN/Creatinine Ratio 19.4 7.0 - 25.0    Anion Gap 10.0 5.0 - 15.0 mmol/L    eGFR  126.4 >60.0 mL/min/1.73   CBC Auto Differential    Specimen: Blood   Result Value Ref Range    WBC 18.66 (H) 3.40 - 10.80 10*3/mm3    RBC 4.60 4.14 - 5.80 10*6/mm3    Hemoglobin 13.1 13.0 - 17.7 g/dL    Hematocrit 38.7 37.5 - 51.0 %    MCV 84.1 79.0 - 97.0 fL    MCH 28.5 26.6 - 33.0 pg    MCHC 33.9 31.5 - 35.7 g/dL    RDW 13.2 12.3 - 15.4 %    RDW-SD 40.4 37.0 - 54.0 fl    MPV 10.3 6.0 - 12.0 fL    Platelets 232 140 - 450 10*3/mm3    Neutrophil % 78.4 (H) 42.7 - 76.0 %    Lymphocyte % 14.7 (L) 19.6 - 45.3 %    Monocyte % 5.8 5.0 - 12.0 %    Eosinophil % 0.4 0.3 - 6.2 %    Basophil % 0.3 0.0 - 1.5 %    Immature Grans % 0.4 0.0 - 0.5 %    Neutrophils, Absolute 14.63 (H) 1.70 - 7.00 10*3/mm3    Lymphocytes, Absolute 2.75 0.70 - 3.10 10*3/mm3    Monocytes, Absolute 1.08 (H) 0.10 - 0.90 10*3/mm3    Eosinophils, Absolute 0.08 0.00 - 0.40 10*3/mm3    Basophils, Absolute 0.05 0.00 - 0.20 10*3/mm3    Immature Grans, Absolute 0.07 (H) 0.00 - 0.05 10*3/mm3    nRBC 0.0 0.0 - 0.2 /100 WBC   Basic Metabolic Panel    Specimen: Blood   Result Value Ref Range    Glucose 101 (H) 65 - 99 mg/dL    BUN 12 6 - 20 mg/dL    Creatinine 0.58 (L) 0.76 - 1.27 mg/dL    Sodium 136 136 - 145 mmol/L    Potassium 3.7 3.5 - 5.2 mmol/L    Chloride 100 98 - 107 mmol/L    CO2 25.0 22.0 - 29.0 mmol/L    Calcium 8.6 8.6 - 10.5 mg/dL    BUN/Creatinine Ratio 20.7 7.0 - 25.0    Anion Gap 11.0 5.0 - 15.0 mmol/L    eGFR 132.1 >60.0 mL/min/1.73   CBC (No Diff)    Specimen: Blood   Result Value Ref Range    WBC 17.71 (H) 3.40 - 10.80 10*3/mm3    RBC 4.19 4.14 - 5.80 10*6/mm3    Hemoglobin 11.8 (L) 13.0 - 17.7 g/dL    Hematocrit 35.4 (L) 37.5 - 51.0 %    MCV 84.5 79.0 - 97.0 fL    MCH 28.2 26.6 - 33.0 pg    MCHC 33.3 31.5 - 35.7 g/dL    RDW 12.9 12.3 - 15.4 %    RDW-SD 39.8 37.0 - 54.0 fl    MPV 10.1 6.0 - 12.0 fL    Platelets 218 140 - 450 10*3/mm3   Protime-INR    Specimen: Blood   Result Value Ref Range    Protime 15.4 (H) 12.2 - 14.5 Seconds    INR 1.21 (H)  0.89 - 1.12   Vancomycin, Trough    Specimen: Blood   Result Value Ref Range    Vancomycin Trough 4.80 (L) 5.00 - 20.00 mcg/mL   Vancomycin, Random    Specimen: Blood   Result Value Ref Range    Vancomycin Random 16.90 5.00 - 40.00 mcg/mL   Basic Metabolic Panel    Specimen: Blood   Result Value Ref Range    Glucose 85 65 - 99 mg/dL    BUN 12 6 - 20 mg/dL    Creatinine 0.56 (L) 0.76 - 1.27 mg/dL    Sodium 136 136 - 145 mmol/L    Potassium 3.7 3.5 - 5.2 mmol/L    Chloride 103 98 - 107 mmol/L    CO2 23.0 22.0 - 29.0 mmol/L    Calcium 8.3 (L) 8.6 - 10.5 mg/dL    BUN/Creatinine Ratio 21.4 7.0 - 25.0    Anion Gap 10.0 5.0 - 15.0 mmol/L    eGFR 133.5 >60.0 mL/min/1.73   Procalcitonin    Specimen: Blood   Result Value Ref Range    Procalcitonin 0.37 (H) 0.00 - 0.25 ng/mL   C-reactive Protein    Specimen: Blood   Result Value Ref Range    C-Reactive Protein 28.31 (H) 0.00 - 0.50 mg/dL   CBC Auto Differential    Specimen: Blood   Result Value Ref Range    WBC 14.61 (H) 3.40 - 10.80 10*3/mm3    RBC 4.08 (L) 4.14 - 5.80 10*6/mm3    Hemoglobin 11.7 (L) 13.0 - 17.7 g/dL    Hematocrit 35.0 (L) 37.5 - 51.0 %    MCV 85.8 79.0 - 97.0 fL    MCH 28.7 26.6 - 33.0 pg    MCHC 33.4 31.5 - 35.7 g/dL    RDW 13.0 12.3 - 15.4 %    RDW-SD 40.4 37.0 - 54.0 fl    MPV 9.5 6.0 - 12.0 fL    Platelets 212 140 - 450 10*3/mm3    Neutrophil % 74.5 42.7 - 76.0 %    Lymphocyte % 16.3 (L) 19.6 - 45.3 %    Monocyte % 6.5 5.0 - 12.0 %    Eosinophil % 1.9 0.3 - 6.2 %    Basophil % 0.3 0.0 - 1.5 %    Immature Grans % 0.5 0.0 - 0.5 %    Neutrophils, Absolute 10.88 (H) 1.70 - 7.00 10*3/mm3    Lymphocytes, Absolute 2.38 0.70 - 3.10 10*3/mm3    Monocytes, Absolute 0.95 (H) 0.10 - 0.90 10*3/mm3    Eosinophils, Absolute 0.28 0.00 - 0.40 10*3/mm3    Basophils, Absolute 0.04 0.00 - 0.20 10*3/mm3    Immature Grans, Absolute 0.08 (H) 0.00 - 0.05 10*3/mm3    nRBC 0.0 0.0 - 0.2 /100 WBC   Vancomycin, Trough    Specimen: Blood   Result Value Ref Range    Vancomycin Trough  4.90 (L) 5.00 - 20.00 mcg/mL   Basic Metabolic Panel    Specimen: Arm, Left; Blood   Result Value Ref Range    Glucose 145 (H) 65 - 99 mg/dL    BUN 13 6 - 20 mg/dL    Creatinine 0.46 (L) 0.76 - 1.27 mg/dL    Sodium 139 136 - 145 mmol/L    Potassium 4.0 3.5 - 5.2 mmol/L    Chloride 106 98 - 107 mmol/L    CO2 25.0 22.0 - 29.0 mmol/L    Calcium 8.6 8.6 - 10.5 mg/dL    BUN/Creatinine Ratio 28.3 (H) 7.0 - 25.0    Anion Gap 8.0 5.0 - 15.0 mmol/L    eGFR 141.6 >60.0 mL/min/1.73   CBC Auto Differential    Specimen: Arm, Left; Blood   Result Value Ref Range    WBC 16.58 (H) 3.40 - 10.80 10*3/mm3    RBC 4.23 4.14 - 5.80 10*6/mm3    Hemoglobin 11.7 (L) 13.0 - 17.7 g/dL    Hematocrit 35.4 (L) 37.5 - 51.0 %    MCV 83.7 79.0 - 97.0 fL    MCH 27.7 26.6 - 33.0 pg    MCHC 33.1 31.5 - 35.7 g/dL    RDW 13.1 12.3 - 15.4 %    RDW-SD 39.6 37.0 - 54.0 fl    MPV 9.6 6.0 - 12.0 fL    Platelets 279 140 - 450 10*3/mm3    Neutrophil % 85.5 (H) 42.7 - 76.0 %    Lymphocyte % 8.3 (L) 19.6 - 45.3 %    Monocyte % 5.2 5.0 - 12.0 %    Eosinophil % 0.0 (L) 0.3 - 6.2 %    Basophil % 0.2 0.0 - 1.5 %    Immature Grans % 0.8 (H) 0.0 - 0.5 %    Neutrophils, Absolute 14.16 (H) 1.70 - 7.00 10*3/mm3    Lymphocytes, Absolute 1.38 0.70 - 3.10 10*3/mm3    Monocytes, Absolute 0.87 0.10 - 0.90 10*3/mm3    Eosinophils, Absolute 0.00 0.00 - 0.40 10*3/mm3    Basophils, Absolute 0.03 0.00 - 0.20 10*3/mm3    Immature Grans, Absolute 0.14 (H) 0.00 - 0.05 10*3/mm3    nRBC 0.0 0.0 - 0.2 /100 WBC   Vancomycin, Random    Specimen: Blood   Result Value Ref Range    Vancomycin Random 13.10 5.00 - 40.00 mcg/mL   Basic Metabolic Panel    Specimen: Blood   Result Value Ref Range    Glucose 87 65 - 99 mg/dL    BUN 19 6 - 20 mg/dL    Creatinine 0.53 (L) 0.76 - 1.27 mg/dL    Sodium 140 136 - 145 mmol/L    Potassium 3.3 (L) 3.5 - 5.2 mmol/L    Chloride 106 98 - 107 mmol/L    CO2 25.0 22.0 - 29.0 mmol/L    Calcium 8.0 (L) 8.6 - 10.5 mg/dL    BUN/Creatinine Ratio 35.8 (H) 7.0 - 25.0     Anion Gap 9.0 5.0 - 15.0 mmol/L    eGFR 135.7 >60.0 mL/min/1.73   CBC Auto Differential    Specimen: Blood   Result Value Ref Range    WBC 13.21 (H) 3.40 - 10.80 10*3/mm3    RBC 4.11 (L) 4.14 - 5.80 10*6/mm3    Hemoglobin 11.5 (L) 13.0 - 17.7 g/dL    Hematocrit 35.0 (L) 37.5 - 51.0 %    MCV 85.2 79.0 - 97.0 fL    MCH 28.0 26.6 - 33.0 pg    MCHC 32.9 31.5 - 35.7 g/dL    RDW 13.2 12.3 - 15.4 %    RDW-SD 40.9 37.0 - 54.0 fl    MPV 9.5 6.0 - 12.0 fL    Platelets 300 140 - 450 10*3/mm3    Neutrophil % 71.5 42.7 - 76.0 %    Lymphocyte % 20.3 19.6 - 45.3 %    Monocyte % 5.4 5.0 - 12.0 %    Eosinophil % 0.9 0.3 - 6.2 %    Basophil % 0.2 0.0 - 1.5 %    Immature Grans % 1.7 (H) 0.0 - 0.5 %    Neutrophils, Absolute 9.44 (H) 1.70 - 7.00 10*3/mm3    Lymphocytes, Absolute 2.68 0.70 - 3.10 10*3/mm3    Monocytes, Absolute 0.71 0.10 - 0.90 10*3/mm3    Eosinophils, Absolute 0.12 0.00 - 0.40 10*3/mm3    Basophils, Absolute 0.03 0.00 - 0.20 10*3/mm3    Immature Grans, Absolute 0.23 (H) 0.00 - 0.05 10*3/mm3    nRBC 0.0 0.0 - 0.2 /100 WBC   ECG 12 Lead ED Triage Standing Order; Chest Pain   Result Value Ref Range    QT Interval 318 ms    QTC Interval 460 ms   ECG 12 Lead Chest Pain   Result Value Ref Range    QT Interval 380 ms    QTC Interval 487 ms   Adult Transthoracic Echo Complete w/ Color, Spectral and Contrast if Necessary Per Protocol   Result Value Ref Range    EF(MOD-bp) 57.8 %    LVIDd 4.9 cm    LVIDs 3.4 cm    IVSd 1.10 cm    LVPWd 1.20 cm    FS 30.6 %    IVS/LVPW 0.92 cm    ESV(cubed) 39.3 ml    EDV(cubed) 117.6 ml    LV mass(C)d 213.3 grams    LVOT area 3.1 cm2    LVOT diam 2.00 cm    EDV(MOD-sp2) 126.0 ml    EDV(MOD-sp4) 166.0 ml    ESV(MOD-sp2) 64.9 ml    ESV(MOD-sp4) 57.0 ml    SV(MOD-sp2) 61.1 ml    SV(MOD-sp4) 109.0 ml    EF(MOD-sp2) 48.5 %    EF(MOD-sp4) 65.7 %    MV E max sergio 79.1 cm/sec    MV A max sergio 76.5 cm/sec    MV dec time 0.16 sec    MV E/A 1.03     Med Peak E' Sergio 5.1 cm/sec    Lat Peak E' Sergio 10.0 cm/sec     TR max monse 121.0 cm/sec    Avg E/e' ratio 10.48     SV(LVOT) 64.5 ml    RV Base 4.0 cm    RV Mid 3.0 cm    RV Length 7.9 cm    TAPSE (>1.6) 2.8 cm    RV S' 15.0 cm/sec    LA dimension (2D)  4.4 cm    LV V1 max 133.0 cm/sec    LV V1 max PG 7.1 mmHg    LV V1 mean PG 3.7 mmHg    LV V1 VTI 20.5 cm    Ao pk monse 157.0 cm/sec    Ao max PG 10.1 mmHg    Ao mean PG 5.3 mmHg    Ao V2 VTI 24.0 cm    PAZ(I,D) 2.7 cm2    MV max PG 3.0 mmHg    MV mean PG 2.00 mmHg    MV V2 VTI 20.2 cm    MVA(VTI) 3.2 cm2    TR max PG 5.9 mmHg    PA V2 max 130.0 cm/sec    PA acc time 0.10 sec    Ao root diam 3.4 cm    LA ESV Index (BP) 37.9 ml/m2    RVSP(TR) 9 mmHg    RAP systole 3 mmHg   Type & Screen    Specimen: Blood   Result Value Ref Range    ABO Type A     RH type Positive     Antibody Screen Negative     T&S Expiration Date 1/13/2024 11:59:59 PM    Prepare RBC, 2 Units   Result Value Ref Range    Product Code V7120K94     Unit Number [id number]     UNIT  ABO A     UNIT  RH POS     Crossmatch Interpretation Compatible     Dispense Status XM     Blood Expiration Date 202401262359     Blood Type Barcode 6200     Product Code K8025L69     Unit Number [id number]     UNIT  ABO A     UNIT  RH POS     Crossmatch Interpretation Compatible     Dispense Status XM     Blood Expiration Date 202401262359     Blood Type Barcode 6200    ABO RH Specimen Verification    Specimen: Blood   Result Value Ref Range    ABO Type A     RH type Positive    Green Top (Gel)   Result Value Ref Range    Extra Tube Hold for add-ons.    Lavender Top   Result Value Ref Range    Extra Tube hold for add-on    Gold Top - SST   Result Value Ref Range    Extra Tube Hold for add-ons.    Gray Top   Result Value Ref Range    Extra Tube Hold for add-ons.    Light Blue Top   Result Value Ref Range    Extra Tube Hold for add-ons.    Duplex Venous Lower Extremity - Bilateral CAR   Result Value Ref Range    Right Common Femoral Spont Y     Right Common Femoral Phasic Y      Right Common Femoral Compress C     Right Saphenofemoral Junction Spont Y     Right Saphenofemoral Junction Phasic Y     Right Saphenofemoral Junction Compress C     Right Profunda Femoral Spont Y     Right Profunda Femoral Phasic Y     Right Proximal Femoral Spont Y     Right Proximal Femoral Phasic Y     Right Proximal Femoral Compress C     Right Mid Femoral Spont Y     Right Mid Femoral Phasic Y     Right Distal Femoral Spont Y     Right Distal Femoral Phasic Y     Right Popliteal Spont Y     Right Popliteal Phasic Y     Right Popliteal Compress C     Right Posterior Tibial Compress C     Right Posterior Tibial Augment Y     Right Peroneal Compress C     Right Peroneal Augment Y     Right Gastronemius Compress C     Right Greater Saph AK Compress C     Right Greater Saph BK Compress C     Right Lesser Saph Compress C     Left Common Femoral Spont Y     Left Common Femoral Phasic Y     Left Common Femoral Compress C     Left Saphenofemoral Junction Spont Y     Left Saphenofemoral Junction Phasic Y     Left Saphenofemoral Junction Compress C     Left Profunda Femoral Spont Y     Left Profunda Femoral Phasic Y     Left Proximal Femoral Spont Y     Left Proximal Femoral Phasic Y     Left Proximal Femoral Compress C     Left Mid Femoral Spont Y     Left Mid Femoral Phasic Y     Left Mid Femoral Compress C     Left Distal Femoral Spont Y     Left Distal Femoral Phasic Y     Left Distal Femoral Compress C     Left Popliteal Spont Y     Left Popliteal Phasic Y     Left Popliteal Compress C     Left Posterior Tibial Compress C     Left Posterior Tibial Augment Y     Left Peroneal Compress C     Left Peroneal Augment Y     Left Gastronemius Compress C     Left Greater Saph AK Compress C     Left Greater Saph BK Compress C     Left Lesser Saph Compress C        If labs were ordered, I independently reviewed the results and considered them in treating the patient.    RADIOLOGY  XR Chest 1 View   Final Result   Impression:    Mildly increased linear atelectasis in the right basal lung.      Small right layering pleural effusion.      No substantial pneumothorax seen.         Electronically Signed: Phoenix Cook MD     1/13/2024 9:25 AM EST     Workstation ID: [id number]      XR Chest 1 View   Final Result   Impression:   1. Postsurgical changes of the right hemithorax with 2 right-sided chest tubes in place. Previously seen small right basilar pneumothorax no longer clearly identified.   2. Improving right basilar atelectasis.            Electronically Signed: Ron Rogers MD     1/12/2024 7:32 AM EST     Workstation ID: [id number]      XR Chest 1 View   Final Result   Impression:      1. Right basilar airspace disease and pleural effusion.      2. Pneumothorax at the lateral right lung base.         Electronically Signed: Crystal Navarro MD     1/11/2024 1:08 PM CST     Workstation ID: [id number]      US Thoracentesis   Final Result      1. Right thoracentesis yielding 360 mL of slightly cloudy fluid.         Electronically Signed: Main Mcclure MD     1/8/2024 4:37 PM EST     Workstation ID: [id number]      XR Chest 1 View   Final Result   Impression:   1.Small loculated right pleural effusion.   2.Right basilar opacity, which could reflect atelectasis or pneumonia.   3.No pneumothorax identified.         Electronically Signed: Byron Maria MD     1/8/2024 4:32 PM EST     Workstation ID: [id number]      CT Angiogram Chest   Final Result   Impression:   Exam is not adequate for detection of pulmonary embolism. Repeat scanning should be considered if clinically warranted.      Small loculated right pleural effusion. Areas of compressive atelectasis of the right middle and right lower lobes, and mild atelectasis of the left lower lobe.            Electronically Signed: Reyna Caldwell MD     1/8/2024 12:55 PM EST     Workstation ID: [id number]      XR Chest 1 View   Final Result   Impression:   Hypoventilatory changes and evidence of  likely pulmonary edema with small bilateral pleural effusions.         Electronically Signed: Gio Marte MD     1/8/2024 8:47 AM EST     Workstation ID: [id number]        [x] Radiologist's Report Reviewed:  I ordered and independently interpreted the above noted radiographic studies.  See radiologist's dictation for official interpretation.      PROCEDURES    Procedures    ECG 12 Lead Chest Pain   Final Result   Test Reason : Chest Pain   Blood Pressure :   */*   mmHG   Vent. Rate :  99 BPM     Atrial Rate :  99 BPM      P-R Int : 156 ms          QRS Dur :  92 ms       QT Int : 380 ms       P-R-T Axes :  33   8  20 degrees      QTc Int : 487 ms      Normal sinus rhythm   Nonspecific T wave abnormality   Prolonged QT   Abnormal ECG   When compared with ECG of 08-JAN-2024 07:39, (Unconfirmed)   Non-specific change in ST segment in Lateral leads   Confirmed by LUCIO SCHUMACHER MD ([id number]) on 1/10/2024 2:57:03 PM      Referred By:            Confirmed By: LUCIO SCHUMACHER MD      ECG 12 Lead ED Triage Standing Order; Chest Pain   Final Result   Test Reason : ED Triage Standing Order~   Blood Pressure :   */*   mmHG   Vent. Rate : 126 BPM     Atrial Rate : 126 BPM      P-R Int : 144 ms          QRS Dur :  88 ms       QT Int : 318 ms       P-R-T Axes :  33  32  24 degrees      QTc Int : 460 ms      Sinus tachycardia   Possible Left atrial enlargement   Borderline ECG   No previous ECGs available   Confirmed by LUCIO SCHUMACHER MD ([id number]) on 1/10/2024 2:56:49 PM      Referred By:            Confirmed By: LUCIO SCHUMACHER MD      SCANNED - TELEMETRY     Final Result      SCANNED - TELEMETRY     Final Result      SCANNED - TELEMETRY     Final Result      SCANNED - TELEMETRY     Final Result          MEDICATIONS GIVEN IN ER    Medications   sodium chloride 0.9 % flush 10 mL (10 mL Intravenous Given 1/13/24 1144)   sodium chloride 0.9 % flush 10 mL ( Intravenous MAR Unhold 1/11/24 1254)   sodium chloride 0.9 % infusion 40 mL ( Intravenous  MAR Unhold 1/11/24 1254)   Potassium Replacement - Follow Nurse / BPA Driven Protocol (has no administration in time range)   Magnesium Standard Dose Replacement - Follow Nurse / BPA Driven Protocol ( Does not apply MAR Unhold 1/11/24 1254)   Phosphorus Replacement - Follow Nurse / BPA Driven Protocol ( Does not apply MAR Unhold 1/11/24 1254)   Calcium Replacement - Follow Nurse / BPA Driven Protocol ( Does not apply MAR Unhold 1/11/24 1254)   acetaminophen (TYLENOL) tablet 650 mg ( Oral MAR Unhold 1/11/24 1254)     Or   acetaminophen (TYLENOL) 160 MG/5ML oral solution 650 mg ( Oral MAR Unhold 1/11/24 1254)     Or   acetaminophen (TYLENOL) suppository 650 mg ( Rectal MAR Unhold 1/11/24 1254)   ketorolac (TORADOL) injection 15 mg (15 mg Intravenous Given 1/10/24 1950)   cefTRIAXone (ROCEPHIN) 2,000 mg in sodium chloride 0.9 % 100 mL IVPB (2,000 mg Intravenous New Bag 1/12/24 1642)   Pharmacy to dose vancomycin (has no administration in time range)   nicotine (NICODERM CQ) 21 MG/24HR patch 1 patch (1 patch Transdermal Not Given 1/11/24 1708)   metroNIDAZOLE (FLAGYL) tablet 500 mg (500 mg Oral Given 1/13/24 0518)   melatonin tablet 10 mg ( Oral MAR Unhold 1/11/24 1254)   nitroglycerin (NITROSTAT) SL tablet 0.4 mg (has no administration in time range)   acetaminophen (TYLENOL) tablet 1,000 mg (1,000 mg Oral Given 1/13/24 0518)   ondansetron ODT (ZOFRAN-ODT) disintegrating tablet 4 mg (has no administration in time range)     Or   ondansetron (ZOFRAN) injection 4 mg (has no administration in time range)   bisacodyl (DULCOLAX) suppository 10 mg (has no administration in time range)   polyethylene glycol (MIRALAX) packet 17 g (17 g Oral Given 1/13/24 0800)   niCARdipine (CARDENE) 25mg in 250mL NS infusion (0 mg/hr Intravenous Hold 1/11/24 1500)   HYDROmorphone (DILAUDID) injection 0.5 mg (0.5 mg Intravenous Given 1/13/24 1031)   gabapentin (NEURONTIN) capsule 100 mg (100 mg Oral Given 1/13/24 1151)   vancomycin IVPB 1500  mg in 0.9% NaCl (Premix) 500 mL (1,500 mg Intravenous New Bag 1/13/24 0805)   bisacodyl (DULCOLAX) EC tablet 5 mg (has no administration in time range)   polyethylene glycol (MIRALAX) packet 17 g (has no administration in time range)   sennosides-docusate (PERICOLACE) 8.6-50 MG per tablet 2 tablet (2 tablets Oral Given 1/13/24 0800)   Enoxaparin Sodium (LOVENOX) syringe 40 mg ( Subcutaneous Dose Auto Held 1/28/24 0800)   oxyCODONE (ROXICODONE) immediate release tablet 7.5 mg (7.5 mg Oral Given 1/13/24 1151)   pantoprazole (PROTONIX) EC tablet 40 mg (40 mg Oral Given 1/13/24 0518)   potassium chloride (K-DUR,KLOR-CON) CR tablet 40 mEq (40 mEq Oral Given 1/13/24 1151)   aspirin chewable tablet 324 mg (324 mg Oral Given 1/8/24 0755)   sodium chloride 0.9 % bolus 1,000 mL (0 mL Intravenous Stopped 1/8/24 1030)   ketorolac (TORADOL) injection 15 mg (15 mg Intravenous Given 1/8/24 0841)   LORazepam (ATIVAN) tablet 1 mg (1 mg Oral Given 1/8/24 1119)   iopamidol (ISOVUE-370) 76 % injection 100 mL (90 mL Intravenous Given 1/8/24 1244)   vancomycin 2750 mg/500 mL 0.9% NS IVPB ([facility]) (2,750 mg Intravenous New Bag 1/8/24 2250)   lidocaine PF 1% (XYLOCAINE) injection 10 mL (10 mL Injection Given 1/8/24 1617)   ceFAZolin in Sodium Chloride (ANCEF) IVPB solution 3,000 mg (3,000 mg Intravenous Given 1/11/24 1117)   famotidine (PEPCID) injection 20 mg (20 mg Intravenous Given 1/11/24 0954)   methadone (DOLOPHINE) tablet 90 mg (90 mg Oral Given 1/11/24 1106)   ketorolac (TORADOL) injection 30 mg (30 mg Intravenous Given 1/12/24 1819)   ketorolac (TORADOL) injection 30 mg (30 mg Intravenous Given 1/13/24 2396)       MEDICAL DECISION MAKING, PROGRESS, and CONSULTS   Medical Decision Making  Lion Solis is a 33 y.o. male who presents to the ED c/o chest pain.  Patient reports that the pain in his chest started 3 days ago.  Patient thought he had pulled a muscle.  Patient was seen at the urgent care and given Toradol.  Toradol helped  initially.  He was discharged home with Tylenol and lidocaine patches.  Patient has had intermittent fevers.  Fevers up to 102.  He has had a cough over the past 3 to 4 weeks.  He has had intermittent episodes of shortness of breath.  He complains of pain on the right side of his chest that radiates directly into his back.  Patient is an IV drug abuser.  He denies any cardiac history.  He reports he has never had a stress test or cardiac cath.  He denies any recent travel or recent immobilization.      Problems Addressed:  Chest pain, unspecified type: acute illness or injury     Details: Patient complains of chest pain that started 3 days ago.  Patient has a history of IV drug abuse.  There is great concern of endocarditis.  Fever, unspecified fever cause: acute illness or injury     Details: Patient has hide fever flulike symptoms over the past couple days.  IV drug abuse: chronic illness or injury     Details: Patient uses IV fentanyl daily.    Amount and/or Complexity of Data Reviewed  Labs: ordered. Decision-making details documented in ED Course.  Radiology: ordered. Decision-making details documented in ED Course.  ECG/medicine tests: ordered. Decision-making details documented in ED Course.    Risk  OTC drugs.  Prescription drug management.  Decision regarding hospitalization.        All labs have been independently reviewed by me.  All radiology studies have been interpreted by me and the radiologist dictating the report.  All EKG's have been independently interpreted by me as well as and overseeing attending physician.    [] Discussed with radiology regarding test interpretation:    Discussion below represents my analysis of pertinent findings related to patient's condition, differential diagnosis, treatment plan and final disposition.    Differential diagnosis:  The differential diagnosis associated with the patient's presentation includes: Chest pain, MI, PE, endocarditis, IV drug abuse, electrolyte  imbalance, dehydration, pneumonia, COVID, influenza, other respiratory illness.    Additional sources  Discussed/ obtained information from independent historians:   [x] Spouse  [] Parent  [] Family member  [] Friend  [] EMS   [] Other:  External (non-ED) record review:   [x] Inpatient record:   [] Office record:   [x] Outpatient record:   [x] Prior Outpatient labs:   [x] Prior Outpatient radiology:   [] Primary Care record:   [] Outside ED record:   [] Other:   Patient's care impacted by:   [] Diabetes  [] Hypertension  [] Hyperlipidemia  [] Hypothyroidism   [] Coronary Artery Disease   [] COPD   [] Cancer   [] Obesity  [] GERD   [] Tobacco Abuse   [] Substance Abuse    [] Anxiety   [] Depression   [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, IV drug abuser currently daily fentanyl  Care significantly affected by Social Determinants of Health (housing and economic circumstances, unemployment)    [] Yes     [x] No   If yes, Patient's care significantly limited by  Social Determinants of Health including:   [] Inadequate housing   [] Low income   [] Alcoholism and drug addiction in family   [] Problems related to primary support group   [] Unemployment   [] Problems related to employment   [] Other Social Determinants of Health:     Shared decision making: Shared decision making with patient we will admit the patient to the hospitalist.  CTA of the chest did not reveal PE however there is still concern for possible endocarditis.  Will start the patient on IV antibiotics.  We will admit the patient to the hospitalist.    Orders placed during this visit:  Orders Placed This Encounter   Procedures    Blood Culture - Blood,    Blood Culture - Blood,    COVID PRE-OP / PRE-PROCEDURE SCREENING ORDER (NO ISOLATION) - Swab, Nasopharynx    Respiratory Panel PCR w/COVID-19(SARS-CoV-2) VERONICA/MARCELO/ISIDRO/PAD/COR/JOHN In-House, NP Swab in UTM/VTM, 2 HR TAT - Swab, Nasopharynx    AFB Culture - Body Fluid, Pleural Cavity    Body Fluid Culture - Body  Fluid, Pleural Cavity    Anaerobic Culture - Pleural Fluid, Pleural Cavity    Fungus Culture - Body Fluid, Pleural Cavity    Fungus Smear - Body Fluid, Pleural Cavity    MRSA Screen, PCR (Inpatient) - Swab, Nares    Blood Culture - Blood,    Blood Culture - Blood,    Blood Culture ID, PCR - Blood,    Anaerobic Culture - Tissue, Pleural Cavity    Tissue / Bone Culture - Tissue, Pleural Cavity    AFB Culture - Tissue, Pleural Cavity    XR Chest 1 View    CT Angiogram Chest    US Thoracentesis    XR Chest 1 View    XR Chest 1 View    XR Chest 1 View    XR Chest 1 View    Corinne Draw    High Sensitivity Troponin T    Comprehensive Metabolic Panel    Lipase    BNP    CBC Auto Differential    D-dimer, Quantitative    Lactic Acid, Plasma    Urine Drug Screen - Urine, Clean Catch    Procalcitonin    High Sensitivity Troponin T 2Hr    Fentanyl, Urine - Urine, Clean Catch    Protime-INR    Body Fluid Cell Count With Differential - Pleural Fluid, Pleural Cavity    pH, Body Fluid - Pleural Fluid, Pleural Cavity    Lactate Dehydrogenase, Body Fluid - Pleural Fluid, Pleural Cavity    Protein, Body Fluid - Pleural Fluid, Pleural Cavity    Glucose, Body Fluid - Pleural Fluid, Pleural Cavity    HIV-1 / O / 2 Ag / Antibody    Body fluid cell count - Pleural Fluid, Pleural Cavity    Lactate Dehydrogenase    Comprehensive Metabolic Panel    Body fluid differential - Pleural Fluid, Pleural Cavity    CBC Auto Differential    Basic Metabolic Panel    CBC (No Diff)    Protime-INR    Vancomycin, Trough    Basic Metabolic Panel    Procalcitonin    C-reactive Protein    Vancomycin, Random    Vancomycin, Trough    CBC Auto Differential    Basic Metabolic Panel    CBC Auto Differential    Vancomycin, Random    Basic Metabolic Panel    CBC Auto Differential    Potassium    Diet: Regular/House Diet; Texture: Regular Texture (IDDSI 7); Fluid Consistency: Thin (IDDSI 0)    Undress & Gown    Obtain Informed Consent    Vital Signs    Intake & Output     Weigh Patient    Oral Care    Place Sequential Compression Device    Maintain Sequential Compression Device    Activity As Tolerated    Notify Provider (With Default Parameters)    Opioid Administration - Document EtCO2 and / or SpO2 With Each Set of Vitals & Any Change in Patient Status    Opioid Administration - Notify Provider Hypercapnic Monitoring    Obtain Informed Consent    Verify Height & Weight in Chart    Patient Education - Give Patient Pre-Op Teaching Materials, Initiate Teaching Protocol & Appropriate Clinical Pathway    Verify Informed Consent for Blood Product Administration    Notify Physician (Standard Adult Parameters)    Notify Provider - Chest Tube Output Greater Than 100 mL/hr    Vital Signs every 1 hour x4, then every 2 hours x2, then every 4 hours.    Telemetry - Maintain IV Access    Telemetry - Place Orders & Notify Provider of Results When Patient Experiences Acute Chest Pain, Dysrhythmia or Respiratory Distress    May Be Off Telemetry for Tests    Up in Chair tonight if hemodynamically stable and for meals.    Ambulate patient in AM    I&O every 4 hours for the first 24 hours, then every shift.    Chest tube to:    Daily dressing changes to chest tube site.    Advance Diet As Tolerated -    Continuous Pulse Oximetry    Place Sequential Compression Device    Maintain Sequential Compression Device    DO NOT bolus patient without notifying Surgeon    Do not administer any anticoagulation, including heparin or lovenox for 48 hours.  Nursing Communication    Code Status and Medical Interventions:    Inpatient Opioid Addiction Consult    Inpatient Infectious Diseases Consult    Inpatient Infectious Diseases Consult    Inpatient Cardiothoracic Surgery Consult    PT Consult: Eval & Treat Functional Mobility Below Baseline    Oxygen Therapy- Nasal Cannula; Titrate 1-6 LPM Per SpO2; 90 - 95%    Incentive Spirometry    ECG 12 Lead ED Triage Standing Order; Chest Pain    ECG 12 Lead Chest Pain     SCANNED - TELEMETRY      SCANNED - TELEMETRY      SCANNED - TELEMETRY      SCANNED - TELEMETRY      Adult Transthoracic Echo Complete w/ Color, Spectral and Contrast if Necessary Per Protocol    Type & Screen    Prepare RBC, 2 Units    ABO RH Specimen Verification    Insert Peripheral IV    Insert Peripheral IV    Insert Peripheral IV    Insert Peripheral IV x2    Initiate Observation Status    Inpatient Admission    ED Bed Request    Transfer Patient    CBC & Differential    Green Top (Gel)    Lavender Top    Gold Top - SST    Gray Top    Light Blue Top    CBC & Differential    CBC & Differential    CBC & Differential    CBC & Differential       I considered prescription management  with:   [] Pain medication  [] Antiviral  [] Antibiotic   [] Other:   Rationale:  Additional orders considered but not ordered:  The following testing was considered but ultimately not selected after discussion with patient/family:    ED Course:    ED Course as of 01/13/24 1200   Mon Jan 08, 2024   0804 I discussed Mr. Aguilar's care with nurse practitioner Regi and we will proceed with sepsis workup. [DT]   0904 D-Dimer, Quant(!): 1.62 [KG]   0904 WBC(!): 17.97  Leukocytosis.  IV fluids infusing.  Lactate pending, Pro-Reno pending.  Blood cultures have been obtained. [KG]   0904 D-Dimer, Quant(!): 1.62  Elevated D-dimer.  CT angio of the chest will be placed. [KG]   1007 Procalcitonin: 0.21 [KG]   1007 HS Troponin T: <6 [KG]   1007 Lipase: 14 [KG]   1008 Sodium: 136 [KG]   1008 Potassium: 3.9 [KG]   1008 Chloride: 100 [KG]   1318 Dr. Mchugh notified of patient.  Dr. Larson will resume care. [KG]      ED Course User Index  [DT] Esdras Bello MD  [KG] Malinda Keene, APRN            DIAGNOSIS  Final diagnoses:   Fever, unspecified fever cause   Chest pain, unspecified type   IV drug abuse       DISPOSITION    ED Disposition       ED Disposition   Decision to Admit    Condition   --    Comment   Level of Care: Telemetry [5]    Diagnosis: Fever [542127]                 Please note that portions of this document were completed with voice recognition software.       Malinda Keene, APRN  01/13/24 1203

## 2024-01-08 NOTE — PLAN OF CARE
Goal Outcome Evaluation:  Plan of Care Reviewed With: patient        Progress: improving  Outcome Evaluation: VSS on RA. Complaints of pain relieved with PRN IV meds. Thoracentesis completed today and 260ml removed. Resting after procedure. Tolerating diet. Discussed plan of care with patient who verbalizes understanding.

## 2024-01-08 NOTE — NURSING NOTE
US guided right thoracentesis performed by Dr Mcclure. 360 mls removed from pleural space. Labs obtained.  CXR performed post procedure.  Patient tolerated well. Report called to nurse.

## 2024-01-08 NOTE — CASE MANAGEMENT/SOCIAL WORK
Discharge Planning Assessment  [facility]     Patient Name: Lion Solis  MRN: [id number]  Today's Date: 1/8/2024    Admit Date: 1/8/2024    Plan: IDP   Discharge Needs Assessment       Row Name 01/08/24 1401       Living Environment    People in Home spouse    Name(s) of People in Home Alondra MARTINEZ    Current Living Arrangements home    Primary Care Provided by self    Provides Primary Care For no one    Family Caregiver if Needed spouse    Quality of Family Relationships helpful;involved    Able to Return to Prior Arrangements yes       Transition Planning    Patient/Family Anticipates Transition to home with family    Patient/Family Anticipated Services at Transition none    Transportation Anticipated family or friend will provide       Discharge Needs Assessment    Readmission Within the Last 30 Days no previous admission in last 30 days    Equipment Currently Used at Home none    Concerns to be Addressed denies needs/concerns at this time                   Discharge Plan       Row Name 01/08/24 1408       Plan    Plan IDP    Plan Comments MSW met with Pt and spouse at bedside to complete IDP. Pt lives with spouse in [facility]. Pt confirmed demographics and reports no current PCP; Pt agreeable to having PCP arranged at discharged and reports no preference. Pt has [facility] coverage. Pt independent with ADLs; Pt reports no DME, HH, or O2. Pt’s preferred pharmacy is [facility]. Pt’s wife able to transport when medically ready. Pt denied needs or concerns. CM will continue to follow and assist with discharge planning.    Final Discharge Disposition Code 30 - still a patient                  Continued Care and Services - Admitted Since 1/8/2024    Coordination has not been started for this encounter.          Demographic Summary       Row Name 01/08/24 1401       General Information    Arrived From home    Referral Source admission list;emergency department    Reason for Consult discharge  planning    Preferred Language English                   Functional Status       Row Name 01/08/24 1401       Functional Status    Usual Activity Tolerance good    Current Activity Tolerance good       Functional Status, IADL    Medications independent    Meal Preparation independent    Housekeeping independent    Laundry independent    Shopping independent                               CARLO Avina

## 2024-01-09 ENCOUNTER — APPOINTMENT (OUTPATIENT)
Dept: CARDIOLOGY | Facility: HOSPITAL | Age: 34
End: 2024-01-09
Payer: COMMERCIAL

## 2024-01-09 PROBLEM — J90 PLEURAL EFFUSION: Status: ACTIVE | Noted: 2024-01-09

## 2024-01-09 LAB
ALBUMIN SERPL-MCNC: 3.5 G/DL (ref 3.5–5.2)
ALBUMIN/GLOB SERPL: 0.9 G/DL
ALP SERPL-CCNC: 117 U/L (ref 39–117)
ALT SERPL W P-5'-P-CCNC: 52 U/L (ref 1–41)
ANION GAP SERPL CALCULATED.3IONS-SCNC: 10 MMOL/L (ref 5–15)
AST SERPL-CCNC: 22 U/L (ref 1–40)
BACTERIA BLD CULT: ABNORMAL
BASOPHILS # BLD AUTO: 0.05 10*3/MM3 (ref 0–0.2)
BASOPHILS NFR BLD AUTO: 0.3 % (ref 0–1.5)
BH CV ECHO MEAS - AO MAX PG: 10.1 MMHG
BH CV ECHO MEAS - AO MEAN PG: 5.3 MMHG
BH CV ECHO MEAS - AO ROOT DIAM: 3.4 CM
BH CV ECHO MEAS - AO V2 MAX: 157 CM/SEC
BH CV ECHO MEAS - AO V2 VTI: 24 CM
BH CV ECHO MEAS - AVA(I,D): 2.7 CM2
BH CV ECHO MEAS - EDV(CUBED): 117.6 ML
BH CV ECHO MEAS - EDV(MOD-SP2): 126 ML
BH CV ECHO MEAS - EDV(MOD-SP4): 166 ML
BH CV ECHO MEAS - EF(MOD-BP): 57.8 %
BH CV ECHO MEAS - EF(MOD-SP2): 48.5 %
BH CV ECHO MEAS - EF(MOD-SP4): 65.7 %
BH CV ECHO MEAS - ESV(CUBED): 39.3 ML
BH CV ECHO MEAS - ESV(MOD-SP2): 64.9 ML
BH CV ECHO MEAS - ESV(MOD-SP4): 57 ML
BH CV ECHO MEAS - FS: 30.6 %
BH CV ECHO MEAS - IVS/LVPW: 0.92 CM
BH CV ECHO MEAS - IVSD: 1.1 CM
BH CV ECHO MEAS - LA DIMENSION: 4.4 CM
BH CV ECHO MEAS - LAT PEAK E' VEL: 10 CM/SEC
BH CV ECHO MEAS - LV MASS(C)D: 213.3 GRAMS
BH CV ECHO MEAS - LV MAX PG: 7.1 MMHG
BH CV ECHO MEAS - LV MEAN PG: 3.7 MMHG
BH CV ECHO MEAS - LV V1 MAX: 133 CM/SEC
BH CV ECHO MEAS - LV V1 VTI: 20.5 CM
BH CV ECHO MEAS - LVIDD: 4.9 CM
BH CV ECHO MEAS - LVIDS: 3.4 CM
BH CV ECHO MEAS - LVOT AREA: 3.1 CM2
BH CV ECHO MEAS - LVOT DIAM: 2 CM
BH CV ECHO MEAS - LVPWD: 1.2 CM
BH CV ECHO MEAS - MED PEAK E' VEL: 5.1 CM/SEC
BH CV ECHO MEAS - MV A MAX VEL: 76.5 CM/SEC
BH CV ECHO MEAS - MV DEC TIME: 0.16 SEC
BH CV ECHO MEAS - MV E MAX VEL: 79.1 CM/SEC
BH CV ECHO MEAS - MV E/A: 1.03
BH CV ECHO MEAS - MV MAX PG: 3 MMHG
BH CV ECHO MEAS - MV MEAN PG: 2 MMHG
BH CV ECHO MEAS - MV V2 VTI: 20.2 CM
BH CV ECHO MEAS - MVA(VTI): 3.2 CM2
BH CV ECHO MEAS - PA ACC TIME: 0.1 SEC
BH CV ECHO MEAS - PA V2 MAX: 130 CM/SEC
BH CV ECHO MEAS - RAP SYSTOLE: 3 MMHG
BH CV ECHO MEAS - RVSP: 9 MMHG
BH CV ECHO MEAS - SV(LVOT): 64.5 ML
BH CV ECHO MEAS - SV(MOD-SP2): 61.1 ML
BH CV ECHO MEAS - SV(MOD-SP4): 109 ML
BH CV ECHO MEAS - TAPSE (>1.6): 2.8 CM
BH CV ECHO MEAS - TR MAX PG: 5.9 MMHG
BH CV ECHO MEAS - TR MAX VEL: 121 CM/SEC
BH CV ECHO MEASUREMENTS AVERAGE E/E' RATIO: 10.48
BH CV LOWER VASCULAR LEFT COMMON FEMORAL COMPRESS: NORMAL
BH CV LOWER VASCULAR LEFT COMMON FEMORAL PHASIC: NORMAL
BH CV LOWER VASCULAR LEFT COMMON FEMORAL SPONT: NORMAL
BH CV LOWER VASCULAR LEFT DISTAL FEMORAL COMPRESS: NORMAL
BH CV LOWER VASCULAR LEFT DISTAL FEMORAL PHASIC: NORMAL
BH CV LOWER VASCULAR LEFT DISTAL FEMORAL SPONT: NORMAL
BH CV LOWER VASCULAR LEFT GASTRONEMIUS COMPRESS: NORMAL
BH CV LOWER VASCULAR LEFT GREATER SAPH AK COMPRESS: NORMAL
BH CV LOWER VASCULAR LEFT GREATER SAPH BK COMPRESS: NORMAL
BH CV LOWER VASCULAR LEFT LESSER SAPH COMPRESS: NORMAL
BH CV LOWER VASCULAR LEFT MID FEMORAL COMPRESS: NORMAL
BH CV LOWER VASCULAR LEFT MID FEMORAL PHASIC: NORMAL
BH CV LOWER VASCULAR LEFT MID FEMORAL SPONT: NORMAL
BH CV LOWER VASCULAR LEFT PERONEAL AUGMENT: NORMAL
BH CV LOWER VASCULAR LEFT PERONEAL COMPRESS: NORMAL
BH CV LOWER VASCULAR LEFT POPLITEAL COMPRESS: NORMAL
BH CV LOWER VASCULAR LEFT POPLITEAL PHASIC: NORMAL
BH CV LOWER VASCULAR LEFT POPLITEAL SPONT: NORMAL
BH CV LOWER VASCULAR LEFT POSTERIOR TIBIAL AUGMENT: NORMAL
BH CV LOWER VASCULAR LEFT POSTERIOR TIBIAL COMPRESS: NORMAL
BH CV LOWER VASCULAR LEFT PROFUNDA FEMORAL PHASIC: NORMAL
BH CV LOWER VASCULAR LEFT PROFUNDA FEMORAL SPONT: NORMAL
BH CV LOWER VASCULAR LEFT PROXIMAL FEMORAL COMPRESS: NORMAL
BH CV LOWER VASCULAR LEFT PROXIMAL FEMORAL PHASIC: NORMAL
BH CV LOWER VASCULAR LEFT PROXIMAL FEMORAL SPONT: NORMAL
BH CV LOWER VASCULAR LEFT SAPHENOFEMORAL JUNCTION COMPRESS: NORMAL
BH CV LOWER VASCULAR LEFT SAPHENOFEMORAL JUNCTION PHASIC: NORMAL
BH CV LOWER VASCULAR LEFT SAPHENOFEMORAL JUNCTION SPONT: NORMAL
BH CV LOWER VASCULAR RIGHT COMMON FEMORAL COMPRESS: NORMAL
BH CV LOWER VASCULAR RIGHT COMMON FEMORAL PHASIC: NORMAL
BH CV LOWER VASCULAR RIGHT COMMON FEMORAL SPONT: NORMAL
BH CV LOWER VASCULAR RIGHT DISTAL FEMORAL PHASIC: NORMAL
BH CV LOWER VASCULAR RIGHT DISTAL FEMORAL SPONT: NORMAL
BH CV LOWER VASCULAR RIGHT GASTRONEMIUS COMPRESS: NORMAL
BH CV LOWER VASCULAR RIGHT GREATER SAPH AK COMPRESS: NORMAL
BH CV LOWER VASCULAR RIGHT GREATER SAPH BK COMPRESS: NORMAL
BH CV LOWER VASCULAR RIGHT LESSER SAPH COMPRESS: NORMAL
BH CV LOWER VASCULAR RIGHT MID FEMORAL PHASIC: NORMAL
BH CV LOWER VASCULAR RIGHT MID FEMORAL SPONT: NORMAL
BH CV LOWER VASCULAR RIGHT PERONEAL AUGMENT: NORMAL
BH CV LOWER VASCULAR RIGHT PERONEAL COMPRESS: NORMAL
BH CV LOWER VASCULAR RIGHT POPLITEAL COMPRESS: NORMAL
BH CV LOWER VASCULAR RIGHT POPLITEAL PHASIC: NORMAL
BH CV LOWER VASCULAR RIGHT POPLITEAL SPONT: NORMAL
BH CV LOWER VASCULAR RIGHT POSTERIOR TIBIAL AUGMENT: NORMAL
BH CV LOWER VASCULAR RIGHT POSTERIOR TIBIAL COMPRESS: NORMAL
BH CV LOWER VASCULAR RIGHT PROFUNDA FEMORAL PHASIC: NORMAL
BH CV LOWER VASCULAR RIGHT PROFUNDA FEMORAL SPONT: NORMAL
BH CV LOWER VASCULAR RIGHT PROXIMAL FEMORAL COMPRESS: NORMAL
BH CV LOWER VASCULAR RIGHT PROXIMAL FEMORAL PHASIC: NORMAL
BH CV LOWER VASCULAR RIGHT PROXIMAL FEMORAL SPONT: NORMAL
BH CV LOWER VASCULAR RIGHT SAPHENOFEMORAL JUNCTION COMPRESS: NORMAL
BH CV LOWER VASCULAR RIGHT SAPHENOFEMORAL JUNCTION PHASIC: NORMAL
BH CV LOWER VASCULAR RIGHT SAPHENOFEMORAL JUNCTION SPONT: NORMAL
BH CV XLRA - RV BASE: 4 CM
BH CV XLRA - RV LENGTH: 7.9 CM
BH CV XLRA - RV MID: 3 CM
BH CV XLRA - TDI S': 15 CM/SEC
BILIRUB SERPL-MCNC: 1 MG/DL (ref 0–1.2)
BOTTLE TYPE: ABNORMAL
BUN SERPL-MCNC: 13 MG/DL (ref 6–20)
BUN/CREAT SERPL: 19.4 (ref 7–25)
CALCIUM SPEC-SCNC: 8.8 MG/DL (ref 8.6–10.5)
CHLORIDE SERPL-SCNC: 98 MMOL/L (ref 98–107)
CO2 SERPL-SCNC: 23 MMOL/L (ref 22–29)
CREAT SERPL-MCNC: 0.67 MG/DL (ref 0.76–1.27)
DEPRECATED RDW RBC AUTO: 40.4 FL (ref 37–54)
EGFRCR SERPLBLD CKD-EPI 2021: 126.4 ML/MIN/1.73
EOSINOPHIL # BLD AUTO: 0.08 10*3/MM3 (ref 0–0.4)
EOSINOPHIL NFR BLD AUTO: 0.4 % (ref 0.3–6.2)
ERYTHROCYTE [DISTWIDTH] IN BLOOD BY AUTOMATED COUNT: 13.2 % (ref 12.3–15.4)
GIE STN SPEC: NORMAL
GLOBULIN UR ELPH-MCNC: 4.1 GM/DL
GLUCOSE FLD-MCNC: 61 MG/DL
GLUCOSE SERPL-MCNC: 114 MG/DL (ref 65–99)
HCT VFR BLD AUTO: 38.7 % (ref 37.5–51)
HGB BLD-MCNC: 13.1 G/DL (ref 13–17.7)
HIV 1+2 AB+HIV1 P24 AG SERPL QL IA: NORMAL
IMM GRANULOCYTES # BLD AUTO: 0.07 10*3/MM3 (ref 0–0.05)
IMM GRANULOCYTES NFR BLD AUTO: 0.4 % (ref 0–0.5)
INR PPP: 1.19 (ref 0.89–1.12)
LDH FLD-CCNC: 809 U/L
LEFT ATRIUM VOLUME INDEX: 37.9 ML/M2
LYMPHOCYTES # BLD AUTO: 2.75 10*3/MM3 (ref 0.7–3.1)
LYMPHOCYTES NFR BLD AUTO: 14.7 % (ref 19.6–45.3)
MCH RBC QN AUTO: 28.5 PG (ref 26.6–33)
MCHC RBC AUTO-ENTMCNC: 33.9 G/DL (ref 31.5–35.7)
MCV RBC AUTO: 84.1 FL (ref 79–97)
MONOCYTES # BLD AUTO: 1.08 10*3/MM3 (ref 0.1–0.9)
MONOCYTES NFR BLD AUTO: 5.8 % (ref 5–12)
NEUTROPHILS NFR BLD AUTO: 14.63 10*3/MM3 (ref 1.7–7)
NEUTROPHILS NFR BLD AUTO: 78.4 % (ref 42.7–76)
NRBC BLD AUTO-RTO: 0 /100 WBC (ref 0–0.2)
PLATELET # BLD AUTO: 232 10*3/MM3 (ref 140–450)
PMV BLD AUTO: 10.3 FL (ref 6–12)
POTASSIUM SERPL-SCNC: 4.2 MMOL/L (ref 3.5–5.2)
PROT FLD-MCNC: 5.9 G/DL
PROT SERPL-MCNC: 7.6 G/DL (ref 6–8.5)
PROTHROMBIN TIME: 15.3 SECONDS (ref 12.2–14.5)
RBC # BLD AUTO: 4.6 10*6/MM3 (ref 4.14–5.8)
SODIUM SERPL-SCNC: 131 MMOL/L (ref 136–145)
WBC NRBC COR # BLD AUTO: 18.66 10*3/MM3 (ref 3.4–10.8)

## 2024-01-09 PROCEDURE — 97161 PT EVAL LOW COMPLEX 20 MIN: CPT

## 2024-01-09 PROCEDURE — 25010000002 CEFTRIAXONE PER 250 MG: Performed by: INTERNAL MEDICINE

## 2024-01-09 PROCEDURE — 25010000002 MORPHINE PER 10 MG: Performed by: PHYSICIAN ASSISTANT

## 2024-01-09 PROCEDURE — 25810000003 SODIUM CHLORIDE 0.9 % SOLUTION

## 2024-01-09 PROCEDURE — 93306 TTE W/DOPPLER COMPLETE: CPT

## 2024-01-09 PROCEDURE — 85025 COMPLETE CBC W/AUTO DIFF WBC: CPT | Performed by: INTERNAL MEDICINE

## 2024-01-09 PROCEDURE — 25010000002 KETOROLAC TROMETHAMINE PER 15 MG: Performed by: INTERNAL MEDICINE

## 2024-01-09 PROCEDURE — G0432 EIA HIV-1/HIV-2 SCREEN: HCPCS | Performed by: INTERNAL MEDICINE

## 2024-01-09 PROCEDURE — 99222 1ST HOSP IP/OBS MODERATE 55: CPT | Performed by: PHYSICIAN ASSISTANT

## 2024-01-09 PROCEDURE — 87040 BLOOD CULTURE FOR BACTERIA: CPT | Performed by: INTERNAL MEDICINE

## 2024-01-09 PROCEDURE — 86900 BLOOD TYPING SEROLOGIC ABO: CPT

## 2024-01-09 PROCEDURE — 93970 EXTREMITY STUDY: CPT

## 2024-01-09 PROCEDURE — 99232 SBSQ HOSP IP/OBS MODERATE 35: CPT | Performed by: INTERNAL MEDICINE

## 2024-01-09 PROCEDURE — 86901 BLOOD TYPING SEROLOGIC RH(D): CPT

## 2024-01-09 PROCEDURE — 93970 EXTREMITY STUDY: CPT | Performed by: INTERNAL MEDICINE

## 2024-01-09 PROCEDURE — 85610 PROTHROMBIN TIME: CPT | Performed by: INTERNAL MEDICINE

## 2024-01-09 PROCEDURE — 25010000002 VANCOMYCIN 10 G RECONSTITUTED SOLUTION

## 2024-01-09 PROCEDURE — 80053 COMPREHEN METABOLIC PANEL: CPT | Performed by: INTERNAL MEDICINE

## 2024-01-09 PROCEDURE — 25010000002 MORPHINE PER 10 MG: Performed by: INTERNAL MEDICINE

## 2024-01-09 RX ORDER — CEFAZOLIN SODIUM IN 0.9 % NACL 3 G/100 ML
3000 INTRAVENOUS SOLUTION, PIGGYBACK (ML) INTRAVENOUS ONCE
Qty: 100 ML | Refills: 0 | Status: COMPLETED | OUTPATIENT
Start: 2024-01-11 | End: 2024-01-11

## 2024-01-09 RX ORDER — METRONIDAZOLE 500 MG/1
500 TABLET ORAL EVERY 8 HOURS SCHEDULED
Qty: 21 TABLET | Refills: 0 | Status: DISCONTINUED | OUTPATIENT
Start: 2024-01-09 | End: 2024-01-17 | Stop reason: HOSPADM

## 2024-01-09 RX ORDER — CHOLECALCIFEROL (VITAMIN D3) 125 MCG
10 CAPSULE ORAL NIGHTLY PRN
Status: DISCONTINUED | OUTPATIENT
Start: 2024-01-09 | End: 2024-01-17 | Stop reason: HOSPADM

## 2024-01-09 RX ADMIN — ACETAMINOPHEN 650 MG: 650 SOLUTION ORAL at 20:01

## 2024-01-09 RX ADMIN — Medication 10 MG: at 22:27

## 2024-01-09 RX ADMIN — Medication 10 ML: at 08:50

## 2024-01-09 RX ADMIN — METRONIDAZOLE 500 MG: 500 TABLET ORAL at 13:16

## 2024-01-09 RX ADMIN — METHADONE HYDROCHLORIDE 90 MG: 10 TABLET ORAL at 13:16

## 2024-01-09 RX ADMIN — VANCOMYCIN HYDROCHLORIDE 1250 MG: 10 INJECTION, POWDER, LYOPHILIZED, FOR SOLUTION INTRAVENOUS at 08:47

## 2024-01-09 RX ADMIN — MORPHINE SULFATE 4 MG: 4 INJECTION, SOLUTION INTRAMUSCULAR; INTRAVENOUS at 11:57

## 2024-01-09 RX ADMIN — MORPHINE SULFATE 4 MG: 4 INJECTION, SOLUTION INTRAMUSCULAR; INTRAVENOUS at 20:01

## 2024-01-09 RX ADMIN — VANCOMYCIN HYDROCHLORIDE 1250 MG: 10 INJECTION, POWDER, LYOPHILIZED, FOR SOLUTION INTRAVENOUS at 20:51

## 2024-01-09 RX ADMIN — SENNOSIDES AND DOCUSATE SODIUM 2 TABLET: 8.6; 5 TABLET ORAL at 20:51

## 2024-01-09 RX ADMIN — MORPHINE SULFATE 4 MG: 4 INJECTION, SOLUTION INTRAMUSCULAR; INTRAVENOUS at 04:17

## 2024-01-09 RX ADMIN — KETOROLAC TROMETHAMINE 15 MG: 30 INJECTION INTRAMUSCULAR; INTRAVENOUS at 07:33

## 2024-01-09 RX ADMIN — KETOROLAC TROMETHAMINE 15 MG: 30 INJECTION INTRAMUSCULAR; INTRAVENOUS at 14:57

## 2024-01-09 RX ADMIN — CEFTRIAXONE 2000 MG: 2 INJECTION, POWDER, FOR SOLUTION INTRAMUSCULAR; INTRAVENOUS at 17:24

## 2024-01-09 RX ADMIN — METRONIDAZOLE 500 MG: 500 TABLET ORAL at 22:28

## 2024-01-09 NOTE — PAYOR COMM NOTE
"Lambert Reddy (33 y.o. Male)       Date of Birth   1990    Social Security Number       Address   [address] [place] [address]    Home Phone   [phone]    MRN   [id number]       Cheondoism   None    Marital Status                               Admission Date   24    Admission Type   Emergency    Admitting Provider   Amaury Mchugh MD    Attending Provider   Amaury Mchugh MD    Department, Room/Bed   [facility] 2F, S216/1       Discharge Date       Discharge Disposition       Discharge Destination                                 Attending Provider: Amaury Mchugh MD    Allergies: No Known Allergies    Isolation: None   Infection: MRSA (24)   Code Status: CPR    Ht: 175.3 cm (69\")   Wt: 132 kg (290 lb)    Admission Cmt: None   Principal Problem: Loculated pleural effusion [J90]                   Active Insurance as of 2024       Primary Coverage       Payor Plan Insurance Group Employer/Plan Group    [employer] [place] [employer] [place]        Payor Plan Address Payor Plan Phone Number Payor Plan Fax Number Effective Dates    [address] [address] [phone]  2019 - None Entered    [facility] [id number]         Subscriber Name Subscriber Birth Date Member ID       LAMBERT REDDY 1990 [id number]                     Emergency Contacts        (Rel.) Home Phone Work Phone Mobile Phone    Alondra Reddy (Spouse) -- -- [phone]                 History & Physical        LarsonLius DO at 24 [address] [place] [address] Medicine Services  HISTORY AND PHYSICAL    Patient Name: Lambert Reddy  : 1990  MRN: [id number]  Primary Care Physician: Provider, No Known  Date of admission: 2024      Subjective  Subjective     Chief Complaint:  Chest pain    HPI:  Lambert eRddy is a 33 y.o. male w/ obesity, chronic methadone use, IVDU w/ fentanyl (occasionally re-uses needles but does not share " "needles), vaping (commercially bought nicotine products) who presents for eval of right sided chest pain. He reports recent multiple weeks of upper respiratory illness that resolved ~10 days ago. Then ~5 days ago he developed right sided chest pain, was seen at [facility] w/ temp of 101 orally, was diagnosed w/ a muscle strain, and was given a \"steroid shot\" and naproxen. His pain worsened 2-3 days ago and has become so severe he presented to the ED today. He has RT sided sharp chest pain worse with deep breathing, no sputum production, but does have associated intermittent sweats.      Personal History     Past Medical History:   Diagnosis Date    MRSA infection            History reviewed. No pertinent surgical history.    Family History: family history is not on file.     Social History:  reports that he has been smoking electronic cigarette. He has never used smokeless tobacco. He reports current drug use. Drug: IV.  Social History     Social History Narrative    Not on file       Medications:  Available home medication information reviewed.  lidocaine, methadone, and naproxen    No Known Allergies    Objective  Objective     Vital Signs:   Temp:  [99.5 °F (37.5 °C)] 99.5 °F (37.5 °C)  Heart Rate:  [] 86  Resp:  [24] 24  BP: (127-145)/(70-89) 128/70  Flow (L/min):  [2] 2       Physical Exam   Constitutional: Awake, alert, laying in bed in NAD, appears uncomfortable  HENT: NCAT, mucous membranes moist  Respiratory: Clear to auscultation bilaterally, shallow breathing   Cardiovascular: RRR, no murmurs appreciated  Gastrointestinal: Positive bowel sounds, soft, nontender, nondistended  Musculoskeletal: No LE edema  Psychiatric: Appropriate affect, cooperative  Neurologic: Oriented x 3, moving all extremities equally    Result Review:  I have personally reviewed the results from the time of this admission to 1/8/2024 14:01 EST and agree with these findings:  []  Laboratory list / accordion  []  Microbiology  [x]  " Radiology  []  EKG/Telemetry   []  Cardiology/Vascular   []  Pathology  []  Old records  []  Other:  Most notable findings include: CT chest w/ loculated effusions      LAB RESULTS:      Lab 01/08/24  0838 01/08/24  0828 01/08/24 0814   WBC  --   --  17.97*   HEMOGLOBIN  --   --  13.1   HEMATOCRIT  --   --  39.4   PLATELETS  --   --  278   NEUTROS ABS  --   --  14.38*   IMMATURE GRANS (ABS)  --   --  0.09*   LYMPHS ABS  --   --  2.35   MONOS ABS  --   --  0.93*   EOS ABS  --   --  0.17   MCV  --   --  84.9   PROCALCITONIN 0.21  --   --    LACTATE  --  1.2  --    D DIMER QUANT  --   --  1.62*         Lab 01/08/24 0838   SODIUM 136   POTASSIUM 3.9   CHLORIDE 100   CO2 26.0   ANION GAP 10.0   BUN 18   CREATININE 0.82   EGFR 119.0   GLUCOSE 112*   CALCIUM 9.0         Lab 01/08/24 0838   TOTAL PROTEIN 7.9   ALBUMIN 3.9   GLOBULIN 4.0   ALT (SGPT) 76*   AST (SGOT) 31   BILIRUBIN 0.6   ALK PHOS 89   LIPASE 14         Lab 01/08/24  1038 01/08/24 0838   PROBNP  --  78.6   HSTROP T <6 <6                     Microbiology Results (last 10 days)       Procedure Component Value - Date/Time    COVID PRE-OP / PRE-PROCEDURE SCREENING ORDER (NO ISOLATION) - Swab, Nasopharynx [[id number]]  (Normal) Collected: 01/08/24 0805    Lab Status: Final result Specimen: Swab from Nasopharynx Updated: 01/08/24 0915    Narrative:      The following orders were created for panel order COVID PRE-OP / PRE-PROCEDURE SCREENING ORDER (NO ISOLATION) - Swab, Nasopharynx.  Procedure                               Abnormality         Status                     ---------                               -----------         ------                     Respiratory Panel PCR w/...[[id number]]  Normal              Final result                 Please view results for these tests on the individual orders.    Respiratory Panel PCR w/COVID-19(SARS-CoV-2) VERONICA/MARCELO/ISIDRO/PAD/COR/JOHN In-House, NP Swab in [facility]/VTM, 2 HR TAT - Swab, Nasopharynx [[id number]]  (Normal) Collected:  01/08/24 0805    Lab Status: Final result Specimen: Swab from Nasopharynx Updated: 01/08/24 0915     ADENOVIRUS, PCR Not Detected     Coronavirus 229E Not Detected     Coronavirus HKU1 Not Detected     Coronavirus NL63 Not Detected     Coronavirus OC43 Not Detected     COVID19 Not Detected     Human Metapneumovirus Not Detected     Human Rhinovirus/Enterovirus Not Detected     Influenza A PCR Not Detected     Influenza B PCR Not Detected     Parainfluenza Virus 1 Not Detected     Parainfluenza Virus 2 Not Detected     Parainfluenza Virus 3 Not Detected     Parainfluenza Virus 4 Not Detected     RSV, PCR Not Detected     Bordetella pertussis pcr Not Detected     Bordetella parapertussis PCR Not Detected     Chlamydophila pneumoniae PCR Not Detected     Mycoplasma pneumo by PCR Not Detected    Narrative:      In the setting of a positive respiratory panel with a viral infection PLUS a negative procalcitonin without other underlying concern for bacterial infection, consider observing off antibiotics or discontinuation of antibiotics and continue supportive care. If the respiratory panel is positive for atypical bacterial infection (Bordetella pertussis, Chlamydophila pneumoniae, or Mycoplasma pneumoniae), consider antibiotic de-escalation to target atypical bacterial infection.            CT Angiogram Chest    Result Date: 1/8/2024  CT ANGIOGRAM CHEST Date of Exam: 1/8/2024 12:29 PM EST Indication: chest pain, SOA, elevated d-dimer. r/o PE, infection. known IV drug user. Comparison: None available. Technique: CTA of the chest was performed after the uneventful intravenous administration of 85 mL Isovue-370. Reconstructed coronal and sagittal images were also obtained. In addition, a 3-D volume rendered image was created for interpretation. Automated exposure control and iterative reconstruction methods were used. Findings: There is relatively more dense contrast in the SVC and thoracic aorta as compared to the  pulmonary arteries and their branches. No large central embolism is identified although peripheral branches are not adequately evaluated on this exam. There are no enlarged mediastinal nodes. There are calcified hilar nodes. There is a small partially loculated right pleural effusion. There is no left pleural effusion. There is compressive atelectasis of portions of the right middle and right lower lobes. There is triangular atelectasis of the left lower lobe.     Impression: Impression: Exam is not adequate for detection of pulmonary embolism. Repeat scanning should be considered if clinically warranted. Small loculated right pleural effusion. Areas of compressive atelectasis of the right middle and right lower lobes, and mild atelectasis of the left lower lobe. Electronically Signed: Reyna Caldwell MD  1/8/2024 12:55 PM EST  Workstation ID: [id number]    XR Chest 1 View    Result Date: 1/8/2024  XR CHEST 1 VW Date of Exam: 1/8/2024 8:26 AM EST Indication: Chest Pain Triage Protocol Comparison: None available. Findings: Lung volumes are diminished and there are perihilar interstitial opacities suggesting pulmonary edema pattern. Small bilateral pleural effusions are evident, greater on the right. There is no distinct pneumothorax. The heart appears mildly enlarged.     Impression: Impression: Hypoventilatory changes and evidence of likely pulmonary edema with small bilateral pleural effusions. Electronically Signed: Gio Marte MD  1/8/2024 8:47 AM EST  Workstation ID: [id number]         Assessment & Plan  Assessment & Plan       Loculated pleural effusion    IVDU (intravenous drug user)    Summary: This is a 34 y/o male w/ obesity, chronic methadone use (follows w/ clinic), IVDU w/ fentanyl, vaping, who presented w/ RT sided pleuritic chest pain and recent fever 101F orally, imaging was concerning for loculated RT pleural effusions    Assessment/Plan    RT pleuritic chest pain w/ loculated effusions  Concern for  bacteremia/endocarditis  -HS troponins negative, lipase WNL, CTA w/o large vessel PE (cannot rule out smaller vessels due to contrast timing)  -sometimes re-uses needles but does not share needles  -blood cultures obtained in the ED, will obtain another set later today  -IR guided thoracentesis ordered for today w/ cultures  -check HIV status  -check TTE  -leukocytosis difficult to interpret w/ recent parenteral steroid at [facility]  -start Vanc/CTX, timed for after thoracentesis to maximize culture data (he is non-toxic currently)  -ordered home methadone 90mg daily (pharmacy working to verify), pain control will be difficult, prn toradol, oxycodone, morphine  -if blood cultures positive or TTE shows evidence for endocarditis, anticipate ID consult    Chronic methadone use  IVDU w/ fentanyl  -home methadone 90mg as noted (follows with a clinic), opiate addiction consult for in hospital assistance, other pain control as noted    Nicotine use  -vapes commercially produced nicotine products, ordered NRT while inpatient    DVT prophylaxis:    DVT prophylaxis:  No DVT prophylaxis order currently exists.      CODE STATUS:    Code Status and Medical Interventions:   Ordered at: 01/08/24 1355     Code Status (Patient has no pulse and is not breathing):    CPR (Attempt to Resuscitate)     Medical Interventions (Patient has pulse or is breathing):    Full Support       Expected Discharge   Expected Discharge Date: 1/12/2024; Expected Discharge Time:      Luis Larson DO  01/08/24      Electronically signed by Luis Larson DO at 01/08/24 1438       Lab Results (last 48 hours)       Procedure Component Value Units Date/Time    Body Fluid Culture - Body Fluid, Pleural Cavity [[id number]] Collected: 01/08/24 1618    Specimen: Body Fluid from Pleural Cavity Updated: 01/09/24 0808     Body Fluid Culture No growth     Gram Stain Few (2+) WBCs seen      Few (2+) Red blood cells      No organisms seen    Blood Culture -  Blood, Hand, Left [[id number]] Collected: 01/09/24 0525    Specimen: Blood from Hand, Left Updated: 01/09/24 0712    HIV-1 / O / 2 Ag / Antibody [[id number]]  (Normal) Collected: 01/09/24 0509    Specimen: Blood Updated: 01/09/24 0636     HIV-1/ HIV-2 Non-Reactive    Narrative:      The HIV antibody/antigen combo assay is a qualitative assay for HIV that includes the p24 antigen as well as antibodies to HIV types 1 and 2. This test is intended to be used as a screening assay in the diagnosis of HIV infection in patients over the age of 2.  Results may be falsely decreased if patient taking Biotin.      Comprehensive Metabolic Panel [[id number]]  (Abnormal) Collected: 01/09/24 0509    Specimen: Blood Updated: 01/09/24 0604     Glucose 114 mg/dL      BUN 13 mg/dL      Creatinine 0.67 mg/dL      Sodium 131 mmol/L      Potassium 4.2 mmol/L      Comment: Slight hemolysis detected by analyzer. Result may be falsely elevated.        Chloride 98 mmol/L      CO2 23.0 mmol/L      Calcium 8.8 mg/dL      Total Protein 7.6 g/dL      Albumin 3.5 g/dL      ALT (SGPT) 52 U/L      AST (SGOT) 22 U/L      Alkaline Phosphatase 117 U/L      Total Bilirubin 1.0 mg/dL      Globulin 4.1 gm/dL      Comment: Calculated Result        A/G Ratio 0.9 g/dL      BUN/Creatinine Ratio 19.4     Anion Gap 10.0 mmol/L      eGFR 126.4 mL/min/1.73     Narrative:      GFR Normal >60  Chronic Kidney Disease <60  Kidney Failure <15      Blood Culture ID, PCR - Blood, Arm, Left [[id number]]  (Abnormal) Collected: 01/08/24 0830    Specimen: Blood from Arm, Left Updated: 01/09/24 0545     BCID, PCR Staph spp, not aureus or lugdunensis. Identification by BCID2 PCR.     BOTTLE TYPE Anaerobic Bottle    Protime-INR [[id number]]  (Abnormal) Collected: 01/09/24 0509    Specimen: Blood Updated: 01/09/24 0543     Protime 15.3 Seconds      INR 1.19    CBC & Differential [[id number]]  (Abnormal) Collected: 01/09/24 0509    Specimen: Blood Updated: 01/09/24 0530    Narrative:       The following orders were created for panel order CBC & Differential.  Procedure                               Abnormality         Status                     ---------                               -----------         ------                     CBC Auto Differential[[id number]]        Abnormal            Final result                 Please view results for these tests on the individual orders.    CBC Auto Differential [[id number]]  (Abnormal) Collected: 01/09/24 0509    Specimen: Blood Updated: 01/09/24 0530     WBC 18.66 10*3/mm3      RBC 4.60 10*6/mm3      Hemoglobin 13.1 g/dL      Hematocrit 38.7 %      MCV 84.1 fL      MCH 28.5 pg      MCHC 33.9 g/dL      RDW 13.2 %      RDW-SD 40.4 fl      MPV 10.3 fL      Platelets 232 10*3/mm3      Neutrophil % 78.4 %      Lymphocyte % 14.7 %      Monocyte % 5.8 %      Eosinophil % 0.4 %      Basophil % 0.3 %      Immature Grans % 0.4 %      Neutrophils, Absolute 14.63 10*3/mm3      Lymphocytes, Absolute 2.75 10*3/mm3      Monocytes, Absolute 1.08 10*3/mm3      Eosinophils, Absolute 0.08 10*3/mm3      Basophils, Absolute 0.05 10*3/mm3      Immature Grans, Absolute 0.07 10*3/mm3      nRBC 0.0 /100 WBC     Blood Culture - Blood, Arm, Left [[id number]]  (Abnormal) Collected: 01/08/24 0830    Specimen: Blood from Arm, Left Updated: 01/09/24 0424     Blood Culture Abnormal Stain     Gram Stain Anaerobic Bottle Gram positive cocci in clusters    Narrative:      Less than seven (7) mL's of blood was collected.  Insufficient quantity may yield false negative results.    Lactate Dehydrogenase, Body Fluid - Pleural Fluid, Pleural Cavity [[id number]] Collected: 01/08/24 1618    Specimen: Pleural Fluid from Pleural Cavity Updated: 01/09/24 0041     Lactate Dehydrogenase (LD), Fluid 809 U/L     Narrative:      No Reference Ranges Established.    Serous fluid LDH greater than 60 percent of the serum LDH or serous fluid LDH two-thirds of the upper limit of normal for serum LDH suggests the  fluid is an exudate.     1. Pleural TP/Serum TP >0.5  2. Pleural LD/Serum LD >0.6  3. Pleural LD >2/3 of the upper limit of normal for serum LDH    This test was developed, it performance characteristics determined and judged suitable for clinical purposes by [facility].  It has not been cleared or approved by the FDA.  The laboratory is regulated under CLIA as qualified to perform high-complexity testing.     Protein, Body Fluid - Pleural Fluid, Pleural Cavity [[id number]] Collected: 01/08/24 1618    Specimen: Pleural Fluid from Pleural Cavity Updated: 01/09/24 0041     Protein, Total, Fluid 5.9 g/dL     Narrative:      No Reference Ranges Established.    A serous fluid total fluid (TP) greater than 50 percent of the serum TP suggests the fluid is an exudate.      1. Pleural TP/Serum TP >0.5  2. Pleural LD/Serum LD >0.6  3. Pleural LD >2/3 of the upper limit of normal for serum LDH    This test was developed, it performance characteristics determined and judged suitable for clinical purposes by [facility].  It has not been cleared or approved by the FDA.  The laboratory is regulated under CLIA as qualified to perform high-complexity testing.     Glucose, Body Fluid - Pleural Fluid, Pleural Cavity [[id number]] Collected: 01/08/24 1618    Specimen: Pleural Fluid from Pleural Cavity Updated: 01/09/24 0041     Glucose, Fluid 61 mg/dL     Narrative:      No Reference Ranges Established.    Serous fluid glucose less than 60 mg/dL or less than 30 mg/dL below serum glucose suggests an infectious or malignant exudate.     This test was developed, it performance characteristics determined and judged suitable for clinical purposes by [facility].  It has not been cleared or approved by the FDA.  The laboratory is regulated under CLIA as qualified to perform high-complexity testing.     Body Fluid Cell Count With Differential - Pleural Fluid, Pleural  Cavity [[id number]]  (Abnormal) Collected: 01/08/24 1618    Specimen: Pleural Fluid from Pleural Cavity Updated: 01/08/24 1855    Narrative:      The following orders were created for panel order Body Fluid Cell Count With Differential - Pleural Fluid, Pleural Cavity.  Procedure                               Abnormality         Status                     ---------                               -----------         ------                     Body fluid cell count - ...[[id number]]  Abnormal            Final result               Body fluid differential ...[[id number]]                      Final result                 Please view results for these tests on the individual orders.    Body fluid differential - Pleural Fluid, Pleural Cavity [[id number]] Collected: 01/08/24 1618    Specimen: Pleural Fluid from Pleural Cavity Updated: 01/08/24 1855     Neutrophils, Fluid 81 %      Lymphocytes, Fluid 8 %      Monocytes, Fluid 11 %     Narrative:      If the reference range(s) are not listed, then the reference range(s) have not been defined, so unavailable for the body fluid result.    pH, Body Fluid - Pleural Fluid, Pleural Cavity [[id number]] Collected: 01/08/24 1618    Specimen: Pleural Fluid from Pleural Cavity Updated: 01/08/24 1725     pH, Fluid 7.44    Narrative:      No defined reference range established. This test is classified as a lab developed test, using a pH meter for testing. Its performance characteristics determined and judged suitable for clinical purposes by [facility]. It has not been cleared or approved by the FDA. The laboratory is regulated to perform high-complexity testing.    Body fluid cell count - Pleural Fluid, Pleural Cavity [[id number]]  (Abnormal) Collected: 01/08/24 1618    Specimen: Pleural Fluid from Pleural Cavity Updated: 01/08/24 1722     Color, Fluid Yellow     Appearance, Fluid Cloudy     WBC, Fluid 5,886 /mm3      RBC, Fluid 2,000 /mm3     Narrative:      If the  reference range(s) are not listed, then the reference range(s) have not been defined, so unavailable for the body fluid result.    AFB Culture - Body Fluid, Pleural Cavity [[id number]] Collected: 01/08/24 1618    Specimen: Body Fluid from Pleural Cavity Updated: 01/08/24 1705    Fungus Culture - Body Fluid, Pleural Cavity [[id number]] Collected: 01/08/24 1618    Specimen: Body Fluid from Pleural Cavity Updated: 01/08/24 1705    Fungus Smear - Body Fluid, Pleural Cavity [[id number]] Collected: 01/08/24 1618    Specimen: Body Fluid from Pleural Cavity Updated: 01/08/24 1705    Anaerobic Culture - Pleural Fluid, Pleural Cavity [[id number]] Collected: 01/08/24 1618    Specimen: Pleural Fluid from Pleural Cavity Updated: 01/08/24 1654    Lactate Dehydrogenase [[id number]]  (Abnormal) Collected: 01/08/24 1038    Specimen: Blood Updated: 01/08/24 1652      U/L     MRSA Screen, PCR (Inpatient) - Swab, Nares [[id number]]  (Abnormal) Collected: 01/08/24 1450    Specimen: Swab from Nares Updated: 01/08/24 1650     MRSA PCR Positive    Narrative:      The negative predictive value of this diagnostic test is high and should only be used to consider de-escalating anti-MRSA therapy. A positive result may indicate colonization with MRSA and must be correlated clinically.    Fentanyl, Urine - Urine, Clean Catch [[id number]]  (Abnormal) Collected: 01/08/24 1001    Specimen: Urine, Clean Catch Updated: 01/08/24 1630     Fentanyl, Urine Positive    Narrative:      Negative Threshold:      Fentanyl 5 ng/mL     The normal value for the drug tested is negative. This report includes final unconfirmed screening results to be used for medical treatment purposes only. Unconfirmed results must not be used for non-medical purposes such as employment or legal testing. Clinical consideration should be applied to any drug of abuse test, particularly when unconfirmed results are used.           Blood Culture - Blood, Arm, Right [[id number]]  Collected: 01/08/24 1434    Specimen: Blood from Arm, Right Updated: 01/08/24 1455    [place] Draw [[id number]] Collected: 01/08/24 0814    Specimen: Blood Updated: 01/08/24 1230    Narrative:      The following orders were created for panel order [place] Draw.  Procedure                               Abnormality         Status                     ---------                               -----------         ------                     Green Top (Gel)[[id number]]                                  Final result               Lavender Top[[id number]]                                     Final result               Gold Top - SST[[id number]]                                   Final result               Rain Top[[id number]]                                         Final result               Light Blue Top[[id number]]                                   Final result                 Please view results for these tests on the individual orders.    Gray Top [[id number]] Collected: 01/08/24 0828    Specimen: Blood Updated: 01/08/24 1230     Extra Tube Hold for add-ons.     Comment: Auto resulted.       High Sensitivity Troponin T 2Hr [[id number]] Collected: 01/08/24 1038    Specimen: Blood Updated: 01/08/24 1112     HS Troponin T <6 ng/L      Troponin T Delta --     Comment: Unable to calculate.       Narrative:      High Sensitive Troponin T Reference Range:  <14.0 ng/L- Negative Female for AMI  <22.0 ng/L- Negative Male for AMI  >=14 - Abnormal Female indicating possible myocardial injury.  >=22 - Abnormal Male indicating possible myocardial injury.   Clinicians would have to utilize clinical acumen, EKG, Troponin, and serial changes to determine if it is an Acute Myocardial Infarction or myocardial injury due to an underlying chronic condition.         Urine Drug Screen - Urine, Clean Catch [[id number]]  (Abnormal) Collected: 01/08/24 1001    Specimen: Urine, Clean Catch Updated: 01/08/24 1035     THC, Screen, Urine Negative      Phencyclidine (PCP), Urine Negative     Cocaine Screen, Urine Negative     Methamphetamine, Ur Negative     Opiate Screen Positive     Amphetamine Screen, Urine Negative     Benzodiazepine Screen, Urine Negative     Tricyclic Antidepressants Screen Negative     Methadone Screen, Urine Positive     Barbiturates Screen, Urine Negative     Oxycodone Screen, Urine Negative     Buprenorphine, Screen, Urine Negative    Narrative:      Cutoff For Drugs Screened:    Amphetamines               500 ng/ml  Barbiturates               200 ng/ml  Benzodiazepines            150 ng/ml  Cocaine                    150 ng/ml  Methadone                  200 ng/ml  Opiates                    100 ng/ml  Phencyclidine               25 ng/ml  THC                         50 ng/ml  Methamphetamine            500 ng/ml  Tricyclic Antidepressants  300 ng/ml  Oxycodone                  100 ng/ml  Buprenorphine               10 ng/ml    The normal value for all drugs tested is negative. This report includes unconfirmed screening results, with the cutoff values listed, to be used for medical treatment purposes only.  Unconfirmed results must not be used for non-medical purposes such as employment or legal testing.  Clinical consideration should be applied to any drug of abuse test, particularly when unconfirmed results are used.      Green Top (Gel) [[id number]] Collected: 01/08/24 0838    Specimen: Blood Updated: 01/08/24 0945     Extra Tube Hold for add-ons.     Comment: Auto resulted.       Gold Top - SST [[id number]] Collected: 01/08/24 0838    Specimen: Blood Updated: 01/08/24 0945     Extra Tube Hold for add-ons.     Comment: Auto resulted.       Procalcitonin [[id number]]  (Normal) Collected: 01/08/24 0838    Specimen: Blood Updated: 01/08/24 0920     Procalcitonin 0.21 ng/mL     Narrative:      As a Marker for Sepsis (Non-Neonates):    1. <0.5 ng/mL represents a low risk of severe sepsis and/or septic shock.  2. >2 ng/mL represents a high  "risk of severe sepsis and/or septic shock.    As a Marker for Lower Respiratory Tract Infections that require antibiotic therapy:    PCT on Admission    Antibiotic Therapy       6-12 Hrs later    >0.5                Strongly Recommended  >0.25 - <0.5        Recommended   0.1 - 0.25          Discouraged              Remeasure/reassess PCT  <0.1                Strongly Discouraged     Remeasure/reassess PCT    As 28 day mortality risk marker: \"Change in Procalcitonin Result\" (>80% or <=80%) if Day 0 (or Day 1) and Day 4 values are available. Refer to [email or web address]    Change in PCT <=80%  A decrease of PCT levels below or equal to 80% defines a positive change in PCT test result representing a higher risk for 28-day all-cause mortality of patients diagnosed with severe sepsis for septic shock.    Change in PCT >80%  A decrease of PCT levels of more than 80% defines a negative change in PCT result representing a lower risk for 28-day all-cause mortality of patients diagnosed with severe sepsis or septic shock.       Lipase [[id number]]  (Normal) Collected: 01/08/24 0838    Specimen: Blood Updated: 01/08/24 0919     Lipase 14 U/L     Comprehensive Metabolic Panel [[id number]]  (Abnormal) Collected: 01/08/24 0838    Specimen: Blood Updated: 01/08/24 0919     Glucose 112 mg/dL      BUN 18 mg/dL      Creatinine 0.82 mg/dL      Sodium 136 mmol/L      Potassium 3.9 mmol/L      Chloride 100 mmol/L      CO2 26.0 mmol/L      Calcium 9.0 mg/dL      Total Protein 7.9 g/dL      Albumin 3.9 g/dL      ALT (SGPT) 76 U/L      AST (SGOT) 31 U/L      Alkaline Phosphatase 89 U/L      Total Bilirubin 0.6 mg/dL      Globulin 4.0 gm/dL      Comment: Calculated Result        A/G Ratio 1.0 g/dL      BUN/Creatinine Ratio 22.0     Anion Gap 10.0 mmol/L      eGFR 119.0 mL/min/1.73     Narrative:      GFR Normal >60  Chronic Kidney Disease <60  Kidney Failure <15      High Sensitivity Troponin T [[id number]]  (Normal) " Collected: 01/08/24 0838    Specimen: Blood Updated: 01/08/24 0917     HS Troponin T <6 ng/L     Narrative:      High Sensitive Troponin T Reference Range:  <14.0 ng/L- Negative Female for AMI  <22.0 ng/L- Negative Male for AMI  >=14 - Abnormal Female indicating possible myocardial injury.  >=22 - Abnormal Male indicating possible myocardial injury.   Clinicians would have to utilize clinical acumen, EKG, Troponin, and serial changes to determine if it is an Acute Myocardial Infarction or myocardial injury due to an underlying chronic condition.         BNP [[id number]]  (Normal) Collected: 01/08/24 0838    Specimen: Blood Updated: 01/08/24 0917     proBNP 78.6 pg/mL     Narrative:      This assay is used as an aid in the diagnosis of individuals suspected of having heart failure. It can be used as an aid in the diagnosis of acute decompensated heart failure (ADHF) in patients presenting with signs and symptoms of ADHF to the emergency department (ED). In addition, NT-proBNP of <300 pg/mL indicates ADHF is not likely.    Age Range Result Interpretation  NT-proBNP Concentration (pg/mL:      <50             Positive            >450                   Gray                 300-450                    Negative             <300    50-75           Positive            >900                  Gray                300-900                  Negative            <300      >75             Positive            >1800                  Gray                300-1800                  Negative            <300    Lavender Top [[id number]] Collected: 01/08/24 0814    Specimen: Blood Updated: 01/08/24 0915     Extra Tube hold for add-on     Comment: Auto resulted       Light Blue Top [[id number]] Collected: 01/08/24 0814    Specimen: Blood Updated: 01/08/24 0915     Extra Tube Hold for add-ons.     Comment: Auto resulted       COVID PRE-OP / PRE-PROCEDURE SCREENING ORDER (NO ISOLATION) - Swab, Nasopharynx [[id number]]  (Normal) Collected: 01/08/24  0805    Specimen: Swab from Nasopharynx Updated: 01/08/24 0915    Narrative:      The following orders were created for panel order COVID PRE-OP / PRE-PROCEDURE SCREENING ORDER (NO ISOLATION) - Swab, Nasopharynx.  Procedure                               Abnormality         Status                     ---------                               -----------         ------                     Respiratory Panel PCR w/...[[id number]]  Normal              Final result                 Please view results for these tests on the individual orders.    Respiratory Panel PCR w/COVID-19(SARS-CoV-2) VERONICA/MARCELO/ISIDRO/PAD/COR/JOHN In-House, NP Swab in UTM/VTM, 2 HR TAT - Swab, Nasopharynx [[id number]]  (Normal) Collected: 01/08/24 0805    Specimen: Swab from Nasopharynx Updated: 01/08/24 0915     ADENOVIRUS, PCR Not Detected     Coronavirus 229E Not Detected     Coronavirus HKU1 Not Detected     Coronavirus NL63 Not Detected     Coronavirus OC43 Not Detected     COVID19 Not Detected     Human Metapneumovirus Not Detected     Human Rhinovirus/Enterovirus Not Detected     Influenza A PCR Not Detected     Influenza B PCR Not Detected     Parainfluenza Virus 1 Not Detected     Parainfluenza Virus 2 Not Detected     Parainfluenza Virus 3 Not Detected     Parainfluenza Virus 4 Not Detected     RSV, PCR Not Detected     Bordetella pertussis pcr Not Detected     Bordetella parapertussis PCR Not Detected     Chlamydophila pneumoniae PCR Not Detected     Mycoplasma pneumo by PCR Not Detected    Narrative:      In the setting of a positive respiratory panel with a viral infection PLUS a negative procalcitonin without other underlying concern for bacterial infection, consider observing off antibiotics or discontinuation of antibiotics and continue supportive care. If the respiratory panel is positive for atypical bacterial infection (Bordetella pertussis, Chlamydophila pneumoniae, or Mycoplasma pneumoniae), consider antibiotic de-escalation to target  "atypical bacterial infection.    Lactic Acid, Plasma [[id number]]  (Normal) Collected: 01/08/24 0828    Specimen: Blood Updated: 01/08/24 0907     Lactate 1.2 mmol/L      Comment: Falsely depressed results may occur on samples drawn from patients receiving N-Acetylcysteine (NAC) or Metamizole.       D-dimer, Quantitative [[id number]]  (Abnormal) Collected: 01/08/24 0814    Specimen: Blood Updated: 01/08/24 0850     D-Dimer, Quantitative 1.62 MCGFEU/mL     Narrative:      According to the assay 's published package insert, a normal (<0.50 MCGFEU/mL) D-dimer result in conjunction with a non-high clinical probability assessment, excludes deep vein thrombosis (DVT) and pulmonary embolism (PE) with high sensitivity.    D-dimer values increase with age and this can make VTE exclusion of an older population difficult. To address this, the [employer], based on best available evidence and recent guidelines, recommends that clinicians use age-adjusted D-dimer thresholds in patients greater than 50 years of age with: a) a low probability of PE who do not meet all Pulmonary Embolism Rule Out Criteria, or b) in those with intermediate probability of PE.   The formula for an age-adjusted D-dimer cut-off is \"age/100\".  For example, a 60 year old patient would have an age-adjusted cut-off of 0.60 MCGFEU/mL and an 80 year old 0.80 MCGFEU/mL.    Blood Culture - Blood, Arm, Left [[id number]] Collected: 01/08/24 0828    Specimen: Blood from Arm, Left Updated: 01/08/24 0841    CBC & Differential [[id number]]  (Abnormal) Collected: 01/08/24 0814    Specimen: Blood Updated: 01/08/24 0835    Narrative:      The following orders were created for panel order CBC & Differential.  Procedure                               Abnormality         Status                     ---------                               -----------         ------                     CBC Auto Differential[[id number]]        Abnormal            Final " result                 Please view results for these tests on the individual orders.    CBC Auto Differential [[id number]]  (Abnormal) Collected: 01/08/24 0814    Specimen: Blood Updated: 01/08/24 0835     WBC 17.97 10*3/mm3      RBC 4.64 10*6/mm3      Hemoglobin 13.1 g/dL      Hematocrit 39.4 %      MCV 84.9 fL      MCH 28.2 pg      MCHC 33.2 g/dL      RDW 13.0 %      RDW-SD 39.9 fl      MPV 10.6 fL      Platelets 278 10*3/mm3      Neutrophil % 80.0 %      Lymphocyte % 13.1 %      Monocyte % 5.2 %      Eosinophil % 0.9 %      Basophil % 0.3 %      Immature Grans % 0.5 %      Neutrophils, Absolute 14.38 10*3/mm3      Lymphocytes, Absolute 2.35 10*3/mm3      Monocytes, Absolute 0.93 10*3/mm3      Eosinophils, Absolute 0.17 10*3/mm3      Basophils, Absolute 0.05 10*3/mm3      Immature Grans, Absolute 0.09 10*3/mm3      nRBC 0.0 /100 WBC

## 2024-01-09 NOTE — CONSULTS
INFECTIOUS DISEASE CONSULT/INITIAL HOSPITAL VISIT    Lion Solis  1990  [id number]    Date of consult: 1/9/2024    Admit date: 1/8/2024    Requesting Provider: Trae Zayas MD   Evaluating physician: Byron Curiel MD  Reason for Consultation:     IVDU, pleural effusion, GPC in blood     Chief Complaint: Chest pain      Subjective   History of present illness:  Lion Solis is a  33 y.o.  Yr old male with obesity, h/o vaping, chronic methadone use, and IV drug use with fentanyl who presented on 1/8 for evaluation of right-sided chest pain. He did have a recent upper respiratory illness resolved about 10 days prior to arrival.  About 5 days prior to arrival he developed right-sided chest pain was evaluated in urgent treatment center and was noted to have a fever up to 101°F.  He was diagnosed with a muscle strain was given a steroid shot and naproxen.  His pain will progressed to the point where he presented to the ER yesterday due to increased pain.  The pain on the right side of his chest is pleuritic/worse with deep inspiration.  He is not having any sputum production.      Since arrival, T-max has been 101°F.  He has been on 2 L nasal cannula supplemental O2 with SPO2 in the low 90s.  He was initially tachycardic up to 126 bpm but this is improved. White blood cell count was initially 17.97 but is trended up slightly to 18.66. MRSA PCR nares is positive. Urine drug screen was positive for methadone, opiates, and fentanyl. Respiratory viral PCR panel was negative. One blood culture set is positive for coagulase-negative staph with final ID in progress. Repeat blood cultures were sent today. HIV fourth-generation screen was negative. Chest x-ray showed bilateral pleural effusions.  CTA chest showed small loculated right pleural effusion and areas of compressive atelectasis of the right middle and right lower lobes and mild atelectasis of the left lower lobe. Right-sided thoracentesis procedure was  done yesterday yielding 360 cc of slightly cloudy fluid. Pleural fluid protein was elevated to 5.9 and LDH was elevated to 809 consistent with an exudate.  Pleural fluid Gram stain showed no organisms and culture is preliminarily no growth to date. A TTE was ordered and pending. The patient has been on IV vancomycin and ceftriaxone by the primary team. ID has been consult for antibiotic recommendations in the setting of the patient's positive blood culture, IV drug abuse, and pleural effusion that is exudative.    Past Medical History:   Diagnosis Date    MRSA infection        History reviewed. No pertinent surgical history.    Pediatric History   Patient Parents    Not on file     Other Topics Concern    Not on file   Social History Narrative    Not on file   The patient uses IV drugs.  He claims to use heroin but he notes that fentanyl was positive and his UDS and it was probably fentanyl.  He does Vape. He is currently unemployed.  He is .    family history is not on file.    No Known Allergies      There is no immunization history on file for this patient.    Medication:    Current Facility-Administered Medications:     !Hold atnticoag x24 hour pre procedure, 1 each, Does not apply, Continuous PRN, Luis Larson,     acetaminophen (TYLENOL) tablet 650 mg, 650 mg, Oral, Q4H PRN **OR** acetaminophen (TYLENOL) 160 MG/5ML oral solution 650 mg, 650 mg, Oral, Q4H PRN **OR** acetaminophen (TYLENOL) suppository 650 mg, 650 mg, Rectal, Q4H PRN, Luis Larson,     sennosides-docusate (PERICOLACE) 8.6-50 MG per tablet 2 tablet, 2 tablet, Oral, BID **AND** polyethylene glycol (MIRALAX) packet 17 g, 17 g, Oral, Daily PRN **AND** bisacodyl (DULCOLAX) EC tablet 5 mg, 5 mg, Oral, Daily PRN **AND** bisacodyl (DULCOLAX) suppository 10 mg, 10 mg, Rectal, Daily PRN, Luis Larson, DO    Calcium Replacement - Follow Nurse / BPA Driven Protocol, , Does not apply, PRN, Luis Larson DO     cefTRIAXone (ROCEPHIN) 2,000 mg in sodium chloride 0.9 % 100 mL IVPB, 2,000 mg, Intravenous, Q24H, Luis Larson DO, Last Rate: 200 mL/hr at 01/08/24 2053, 2,000 mg at 01/08/24 2053    ketorolac (TORADOL) injection 15 mg, 15 mg, Intravenous, Q6H PRN, Luis Larson DO, 15 mg at 01/09/24 0733    Magnesium Standard Dose Replacement - Follow Nurse / BPA Driven Protocol, , Does not apply, PRN, Luis Larson DO    melatonin tablet 5 mg, 5 mg, Oral, Nightly PRN, Trae Zayas MD, 5 mg at 01/08/24 2320    methadone (DOLOPHINE) tablet 90 mg, 90 mg, Oral, Daily, Luis Larson DO    Morphine sulfate (PF) injection 4 mg, 4 mg, Intravenous, Q4H PRN, Luis Larson DO, 4 mg at 01/09/24 0417    nicotine (NICODERM CQ) 21 MG/24HR patch 1 patch, 1 patch, Transdermal, Daily PRN, Luis Larson DO    oxyCODONE (ROXICODONE) immediate release tablet 5 mg, 5 mg, Oral, Q6H PRN, Luis Larson DO    Pharmacy to dose vancomycin, , Does not apply, Continuous PRN, Luis Larson DO    Phosphorus Replacement - Follow Nurse / BPA Driven Protocol, , Does not apply, PRN, Luis Larson DO    Potassium Replacement - Follow Nurse / BPA Driven Protocol, , Does not apply, PRN, Luis Larson DO    sodium chloride 0.9 % flush 10 mL, 10 mL, Intravenous, Q12H, Luis Larson DO    sodium chloride 0.9 % flush 10 mL, 10 mL, Intravenous, PRN, Luis Larson DO    sodium chloride 0.9 % infusion 40 mL, 40 mL, Intravenous, PRN, Luis Larson DO    vancomycin 1250 mg/250 mL 0.9% NS IVPB ([facility]), 1,250 mg, Intravenous, Q12H, Marito Cross, [facility]    Please refer to the medical record for a full medication list    Review of Systems:    Constitutional-- + Fevers.  Also with some recent chills and sweats occasionally.  + fatigue.  HEENT-- No new vision, hearing or throat complaints.  No epistaxis or oral sores.  Denies odynophagia or dysphagia.  No odynophagia or  "dysphagia. No headache, photophobia or neck stiffness.  CV-- + Right-sided pleuritic chest pain recently.  No palpitation or syncope  Resp-- + SOB. + Cough.  No hemoptysis.  GI- No nausea, vomiting, or diarrhea.  No hematochezia, melena, or hematemesis. Denies jaundice or chronic liver disease.  -- No dysuria, hematuria, or flank pain.  Denies hesitancy, urgency or flank pain.  Lymph- no swollen lymph nodes in neck/axilla or groin.   Heme- No active bruising or bleeding; no Hx of DVT or PE.  MS-- no swelling or pain in the bones or joints of arms/legs.  No new back pain.  Neuro-- No acute focal weakness or numbness in the arms or legs.  No seizures.  Skin--No rashes or lesions    Physical Exam:   Vital Signs   Temp:  [98.8 °F (37.1 °C)-101 °F (38.3 °C)] 99.9 °F (37.7 °C)  Heart Rate:  [] 90  Resp:  [18-24] 18  BP: (127-178)/() 178/98    Temp  Min: 98.8 °F (37.1 °C)  Max: 101 °F (38.3 °C)  BP  Min: 127/77  Max: 178/98  Pulse  Min: 85  Max: 111  Resp  Min: 18  Max: 24  SpO2  Min: 90 %  Max: 93 %    Blood pressure 178/98, pulse 90, temperature 99.9 °F (37.7 °C), temperature source Oral, resp. rate 18, height 175.3 cm (69\"), weight 132 kg (290 lb), SpO2 92%.  GENERAL: Awake and alert, In moderate distress. Appears stated age. Morbidly obese.  HEENT:  Normocephalic, atraumatic.  No external oral lesions noted. No neck masses.  Ears externally normal, Nose externally normal.  EYES: PERRL. No conjunctival injection. No icterus.   HEART: RRR, no murmur  LUNGS: Diminished breath sounds in the right lung base. Nonlabored breathing on 2 L nasal cannula  ABDOMEN: Soft, nontender, nondistended. No appreciable HSM.  Bowel sounds normal.  GENITAL: no Talavera catheter  SKIN: no generalized rashes.  No peripheral stigmata of infective endocarditis noted. Does have multiple tattoos over his body including his torso.  PSYCHIATRIC: cooperative.  Appropriate mood and affect  EXT:  No cellulitic change.  NEURO: awake alert " "and oriented ×4.  Normal speech and cognition    Results Review:   I reviewed the patient's new clinical results.  I reviewed the patient's new imaging results and agree with the interpretation.  I reviewed the patient's other test results and agree with the interpretation    Results from last 7 days   Lab Units 01/09/24  0509 01/08/24  0814   WBC 10*3/mm3 18.66* 17.97*   HEMOGLOBIN g/dL 13.1 13.1   HEMATOCRIT % 38.7 39.4   PLATELETS 10*3/mm3 232 278     Results from last 7 days   Lab Units 01/09/24  0509   SODIUM mmol/L 131*   POTASSIUM mmol/L 4.2   CHLORIDE mmol/L 98   CO2 mmol/L 23.0   BUN mg/dL 13   CREATININE mg/dL 0.67*   GLUCOSE mg/dL 114*   CALCIUM mg/dL 8.8     Results from last 7 days   Lab Units 01/09/24  0509   ALK PHOS U/L 117   BILIRUBIN mg/dL 1.0   ALT (SGPT) U/L 52*   AST (SGOT) U/L 22                 Results from last 7 days   Lab Units 01/08/24  0828   LACTATE mmol/L 1.2     Estimated Creatinine Clearance: 211.2 mL/min (A) (by C-G formula based on SCr of 0.67 mg/dL (L)).     Procalitonin Results:      Lab 01/08/24  0838   PROCALCITONIN 0.21      Brief Urine Lab Results       None           No results found for: \"SITE\", \"ALLENTEST\", \"PHART\", \"ZHL2IYN\", \"PO2ART\", \"TJS3VYF\", \"BASEEXCESS\", \"K4ZUZSTN\", \"HGBBG\", \"HCTABG\", \"OXYHEMOGLOBI\", \"METHHGBN\", \"CARBOXYHGB\", \"CO2CT\", \"BAROMETRIC\", \"MODALITY\", \"FIO2\"     Microbiology:  Blood Culture   Date Value Ref Range Status   01/08/2024 Abnormal Stain (C)  Preliminary     BCID, PCR   Date Value Ref Range Status   01/08/2024 (A) Negative by BCID PCR. Culture to Follow. Final    Staph spp, not aureus or lugdunensis. Identification by BCID2 PCR.       Blood Culture   Date Value Ref Range Status   01/08/2024 Abnormal Stain (C)  Preliminary     Body Fluid Culture   Date Value Ref Range Status   01/08/2024 No growth  Preliminary     BCID, PCR   Date Value Ref Range Status   01/08/2024 (A) Negative by BCID PCR. Culture to Follow. Final    Staph spp, not aureus or " neha. Identification by BCID2 PCR.   (      Radiology:  Imaging Results (Last 72 Hours)       Procedure Component Value Units Date/Time    US Thoracentesis [[id number]] Collected: 01/08/24 1636    Specimen: Body Fluid Updated: 01/08/24 1640    Narrative:      DATE OF EXAM:  1/8/2024 4:00 PM EST    PROCEDURE:  US THORACENTESIS    INDICATIONS:  Loculated effusion w/ chest pain, IVDU    COMPARISON:  No Comparisons Available    FLUOROSCOPIC TIME:  None    PHYSICIAN MONITORED CONSCIOUS SEDATION TIME:  None minutes    TECHNIQUE:   A detailed explanation of the procedure, including the risks, benefits, and alternatives was provided. A preprocedure timeout was performed. The interventional radiology nurse monitored the patient at all times. The patient was placed upright in the bed   and the right chest was ultrasounded, demonstrating a loculated right effusion. The right chest was then marked and prepped and draped in the usual sterile fashion. The skin and subcutaneous tissues were anesthetized with 1% lidocaine and a small skin   incision was made. Next, a 4 Lebanese centesis needle was advanced into the collection. A total of 360 mL of slightly cloudy fluid was aspirated and the needle was removed. The patient tolerated the procedure well, without immediate complication.    FINDINGS:  See above      Impression:        1. Right thoracentesis yielding 360 mL of slightly cloudy fluid.      Electronically Signed: Main Mcclure MD    1/8/2024 4:37 PM EST    Workstation ID: [id number]    XR Chest 1 View [[id number]] Collected: 01/08/24 1628     Updated: 01/08/24 1635    Narrative:      XR CHEST 1 VW    Date of Exam: 1/8/2024 4:09 PM EST    Indication: s/p thora    Comparison: CT chest from January 8, 2024    Findings:  There is a small loculated right pleural effusion with right basilar opacity. No pneumothorax is identified. The heart and mediastinal contours appear stable. The pulmonary vascular appears normal. The osseous  structures appear intact.      Impression:      Impression:  1.Small loculated right pleural effusion.  2.Right basilar opacity, which could reflect atelectasis or pneumonia.  3.No pneumothorax identified.      Electronically Signed: Byron Maria MD    1/8/2024 4:32 PM EST    Workstation ID: [id number]    CT Angiogram Chest [[id number]] Collected: 01/08/24 1252     Updated: 01/08/24 1258    Narrative:      CT ANGIOGRAM CHEST    Date of Exam: 1/8/2024 12:29 PM EST    Indication: chest pain, SOA, elevated d-dimer. r/o PE, infection. known IV drug user.    Comparison: None available.    Technique: CTA of the chest was performed after the uneventful intravenous administration of 85 mL Isovue-370. Reconstructed coronal and sagittal images were also obtained. In addition, a 3-D volume rendered image was created for interpretation.   Automated exposure control and iterative reconstruction methods were used.      Findings:  There is relatively more dense contrast in the SVC and thoracic aorta as compared to the pulmonary arteries and their branches. No large central embolism is identified although peripheral branches are not adequately evaluated on this exam. There are no   enlarged mediastinal nodes. There are calcified hilar nodes. There is a small partially loculated right pleural effusion. There is no left pleural effusion. There is compressive atelectasis of portions of the right middle and right lower lobes. There is   triangular atelectasis of the left lower lobe.      Impression:      Impression:  Exam is not adequate for detection of pulmonary embolism. Repeat scanning should be considered if clinically warranted.    Small loculated right pleural effusion. Areas of compressive atelectasis of the right middle and right lower lobes, and mild atelectasis of the left lower lobe.        Electronically Signed: Reyna Caldwell MD    1/8/2024 12:55 PM EST    Workstation ID: [id number]    XR Chest 1 View [[id number]]  Collected: 01/08/24 0841     Updated: 01/08/24 0850    Narrative:      XR CHEST 1 VW    Date of Exam: 1/8/2024 8:26 AM EST    Indication: Chest Pain Triage Protocol    Comparison: None available.    Findings:  Lung volumes are diminished and there are perihilar interstitial opacities suggesting pulmonary edema pattern. Small bilateral pleural effusions are evident, greater on the right. There is no distinct pneumothorax. The heart appears mildly enlarged.      Impression:      Impression:  Hypoventilatory changes and evidence of likely pulmonary edema with small bilateral pleural effusions.      Electronically Signed: Gio Marte MD    1/8/2024 8:47 AM EST    Workstation ID: [id number]        I independently read the patient's recent radiographs-does have a right-sided loculated pleural effusion noted.    IMPRESSION:     Problems:  Sepsis with fevers, tachycardia, and leukocytosis-Suspect possibly due to an empyema on the right side. One blood culture is positive for coagulase-negative staph and could be true Septicemia versus a contaminanted culture.  He does have risk for bacteremia and infective endocarditis in the setting of his recent IV drug abuse. TTE has been ordered and is in progress.  Right-sided Exudative pleural effusion, possible empyema   Coagulase-negative staph in 1 blood culture set so far-unclear if this is a contaminate or true infection.  We will monitor blood cultures including surveillance cultures.  Currently covered with vancomycin  IV drug abuse  MRSA nasal colonization    RECOMMENDATIONS:    -Continue to follow CBC and BMP  -Continue to monitor blood cultures and pleural fluid cultures  -Pleural fluid on the right side is exudative, possibly an empyema.  Could consider CT surgery consultation  -Follow pending TTE  -Continue ceftriaxone 2 g IV every 24 hours and IV vancomycin with pharmacy dosing assistance for now.  -add flagyl 500 mg PO TID for some anaerobic coverage for possible  empyema    The patient is not a good candidate for outpatient IV antibiotics with a PICC line in the setting of his recent IV drug abuse.  I discussed this with him today.    I discussed in length with the patient and his wife at bedside today    I discussed with Dr. Mchugh today    Thank you for asking me to see Lion Solis.  Our group would be pleased to follow this patient over the course of their hospitalization and assist with outpatient antimicrobial therapy, as indicated.  Further recommendations depend on the results of the cultures and clinical course.    Complex MDM    Byron Curiel MD  1/9/2024

## 2024-01-09 NOTE — CASE MANAGEMENT/SOCIAL WORK
Continued Stay Note   [facility]     Patient Name: Lion Solis  MRN: [id number]  Today's Date: 1/9/2024    Admit Date: 1/8/2024    Plan: Ongoing   Discharge Plan       Row Name 01/09/24 1513       Plan    Plan Ongoing    Plan Comments Consult received, thank you.    HPI:  33 y.o. male w/ obesity, chronic methadone use, IVDU w/ fentanyl (occasionally re-uses needles but does not share needles), vaping (commercially bought nicotine products) who presents for eval of right sided chest pain.     DX: Loculated pleural effusion  UDS + Methadone (prescribed), Opiates, Fentanyl    Pt has been restarted on his maintenance dose of Methadone (90 mg).  Current pain management:  Toradol 15 mg IV Q 6 PRN  Morphine 4 mg IV Q 4 PRN  SANYA IR 5 mg PO Q 6 PRN    We will follow.                     Discharge Codes    No documentation.                 Expected Discharge Date and Time       Expected Discharge Date Expected Discharge Time    Jan 12, 2024               Laura Banks RN MA,BSN-  Addiction Medicine

## 2024-01-09 NOTE — PLAN OF CARE
Goal Outcome Evaluation:  Plan of Care Reviewed With: patient           Outcome Evaluation: PT initial evaluation completed for pt presenting with generalized weakness, c/o chest pain, SOA, and decreased functional mobility. Pt ambulated 25ft with CGA. Pt's decreased independence warrants PT skilled care. Recommend D/C home with assistance and OP PT services.      Anticipated Discharge Disposition (PT): home with assist, home with outpatient therapy services

## 2024-01-09 NOTE — THERAPY EVALUATION
Patient Name: Lion Solis  : 1990    MRN: [id number]                              Today's Date: 2024       Admit Date: 2024    Visit Dx:     ICD-10-CM ICD-9-CM   1. Pleural effusion  J90 511.9   2. Loculated pleural effusion  J90 511.9     Patient Active Problem List   Diagnosis    Loculated pleural effusion    IVDU (intravenous drug user)    Pleural effusion     Past Medical History:   Diagnosis Date    MRSA infection      History reviewed. No pertinent surgical history.   General Information       Row Name 24 1533          Physical Therapy Time and Intention    Document Type evaluation  -KG     Mode of Treatment physical therapy  -KG       Row Name 24 1533          General Information    Patient Profile Reviewed yes  -KG     Prior Level of Function independent:;all household mobility;gait;transfer;ADL's;dressing;bathing  -KG     Existing Precautions/Restrictions cardiac;fall;oxygen therapy device and L/min  -KG     Barriers to Rehab none identified  -KG       Row Name 24 1533          Living Environment    People in Home spouse  -KG       Row Name 24 1533          Home Main Entrance    Number of Stairs, Main Entrance none  -KG       Row Name 24 1533          Stairs Within Home, Primary    Number of Stairs, Within Home, Primary none  -KG       Row Name 24 1533          Cognition    Orientation Status (Cognition) oriented x 4  -KG       Row Name 24 1533          Safety Issues, Functional Mobility    Safety Issues Affecting Function (Mobility) awareness of need for assistance;insight into deficits/self-awareness;safety precaution awareness;safety precautions follow-through/compliance  -KG     Impairments Affecting Function (Mobility) balance;coordination;endurance/activity tolerance;postural/trunk control;pain;shortness of breath;strength  -KG               User Key  (r) = Recorded By, (t) = Taken By, (c) = Cosigned By      Initials Name Provider Type    KG  Isabella Partida, PT Physical Therapist                   Mobility       Row Name 01/09/24 1536          Bed Mobility    Bed Mobility supine-sit  -KG     Supine-Sit [place] (Bed Mobility) standby assist;verbal cues  -KG     Assistive Device (Bed Mobility) bed rails;head of bed elevated  -KG     Comment, (Bed Mobility) VC's for sequencing. Pt required increased time and effort to complete due to c/o chest and back pain.  -KG       Row Name 01/09/24 1536          Transfers    Comment, (Transfers) VC's for sequencing and safe hand placement.  -KG       Row Name 01/09/24 1536          Sit-Stand Transfer    Sit-Stand [place] (Transfers) standby assist;verbal cues  -KG       Row Name 01/09/24 1536          Gait/Stairs (Locomotion)    [place] Level (Gait) contact guard;verbal cues  -KG     Distance in Feet (Gait) 25  -KG     Deviations/Abnormal Patterns (Gait) base of support, wide;jeannie decreased;stride length decreased  -KG     Bilateral Gait Deviations forward flexed posture;heel strike decreased  -KG     Comment, (Gait/Stairs) Pt demonstrated step through gait pattern with slow jeannie and wide AGUS. VC's for upright posture with increased stride length. Pt required several standing rest breaks due to c/o severe pain. Pt with mild instability, but able to self correct. Mobility limited by c/o pain and SOA.  -KG               User Key  (r) = Recorded By, (t) = Taken By, (c) = Cosigned By      Initials Name Provider Type    KG Isabella Partida, PT Physical Therapist                   Obj/Interventions       Row Name 01/09/24 1537          Range of Motion Comprehensive    General Range of Motion no range of motion deficits identified  -KG     Comment, General Range of Motion B LE WFL  -KG       Row Name 01/09/24 1537          Strength Comprehensive (MMT)    Comment, General Manual Muscle Testing (MMT) Assessment B LE grossly 3+/5  -KG       Row Name 01/09/24 1537          Balance    Balance  Assessment sitting static balance;standing static balance;standing dynamic balance  -KG     Static Sitting Balance supervision  -KG     Position, Sitting Balance supported;sitting edge of bed  -KG     Static Standing Balance standby assist  -KG     Dynamic Standing Balance contact guard  -KG     Position/Device Used, Standing Balance unsupported  -KG       Row Name 01/09/24 1537          Sensory Assessment (Somatosensory)    Sensory Assessment (Somatosensory) LE sensation intact  -KG               User Key  (r) = Recorded By, (t) = Taken By, (c) = Cosigned By      Initials Name Provider Type    Isabella Bustos, PT Physical Therapist                   Goals/Plan       Row Name 01/09/24 1539          Bed Mobility Goal 1 (PT)    Activity/Assistive Device (Bed Mobility Goal 1, PT) sit to supine;supine to sit  -KG     [place] Level/Cues Needed (Bed Mobility Goal 1, PT) independent  -KG     Time Frame (Bed Mobility Goal 1, PT) 2 weeks  -KG     Progress/Outcomes (Bed Mobility Goal 1, PT) goal ongoing  -KG       Row Name 01/09/24 1539          Transfer Goal 1 (PT)    Activity/Assistive Device (Transfer Goal 1, PT) sit-to-stand/stand-to-sit;bed-to-chair/chair-to-bed  -KG     [place] Level/Cues Needed (Transfer Goal 1, PT) independent  -KG     Time Frame (Transfer Goal 1, PT) 2 weeks  -KG     Progress/Outcome (Transfer Goal 1, PT) goal ongoing  -KG       Row Name 01/09/24 1539          Gait Training Goal 1 (PT)    Activity/Assistive Device (Gait Training Goal 1, PT) gait (walking locomotion);assistive device use;walker, rolling  -KG     [place] Level (Gait Training Goal 1, PT) modified independence  -KG     Distance (Gait Training Goal 1, PT) 400 feet  -KG     Time Frame (Gait Training Goal 1, PT) 2 weeks  -KG     Progress/Outcome (Gait Training Goal 1, PT) goal ongoing  -KG       Row Name 01/09/24 1539          Therapy Assessment/Plan (PT)    Planned Therapy Interventions (PT) balance training;bed  mobility training;gait training;strengthening;transfer training  -KG               User Key  (r) = Recorded By, (t) = Taken By, (c) = Cosigned By      Initials Name Provider Type    KG Isabella Partida, PT Physical Therapist                   Clinical Impression       Row Name 01/09/24 1538          Pain    Pretreatment Pain Rating 9/10  -KG     Posttreatment Pain Rating 10/10  -KG     Pain Location - chest  -KG     Pain Intervention(s) Repositioned;Ambulation/increased activity  -KG       Row Name 01/09/24 1538          Plan of Care Review    Plan of Care Reviewed With patient  -KG     Outcome Evaluation PT initial evaluation completed for pt presenting with generalized weakness, c/o chest pain, SOA, and decreased functional mobility. Pt ambulated 25ft with CGA. Pt's decreased independence warrants PT skilled care. Recommend D/C home with assistance and OP PT services.  -KG       Row Name 01/09/24 1538          Therapy Assessment/Plan (PT)    Patient/Family Therapy Goals Statement (PT) return to PLOF  -KG     Rehab Potential (PT) good, to achieve stated therapy goals  -KG     Criteria for Skilled Interventions Met (PT) yes;skilled treatment is necessary  -KG     Therapy Frequency (PT) daily  -KG       Row Name 01/09/24 1538          Vital Signs    Pre Systolic BP Rehab 154  -KG     Pre Treatment Diastolic BP 91  -KG     Pretreatment Heart Rate (beats/min) 106  -KG     Intratreatment Heart Rate (beats/min) 130  -KG     Posttreatment Heart Rate (beats/min) 122  -KG     Pre SpO2 (%) 92  -KG     O2 Delivery Pre Treatment supplemental O2  -KG     Post SpO2 (%) 94  -KG     O2 Delivery Post Treatment supplemental O2  -KG     Pre Patient Position Supine  -KG     Intra Patient Position Standing  -KG     Post Patient Position Sitting  -KG       Row Name 01/09/24 1538          Positioning and Restraints    Pre-Treatment Position in bed  -KG     Post Treatment Position bed  -KG     In Bed notified nsg;sitting EOB;call  light within reach;encouraged to call for assist;with family/caregiver  -KG               User Key  (r) = Recorded By, (t) = Taken By, (c) = Cosigned By      Initials Name Provider Type    Isabella Bustos PT Physical Therapist                   Outcome Measures       Row Name 01/09/24 1540 01/09/24 1000       How much help from another person do you currently need...    Turning from your back to your side while in flat bed without using bedrails? 4  -KG 4  -CJ    Moving from lying on back to sitting on the side of a flat bed without bedrails? 3  -KG 4  -CJ    Moving to and from a bed to a chair (including a wheelchair)? 3  -KG 3  -CJ    Standing up from a chair using your arms (e.g., wheelchair, bedside chair)? 3  -KG 3  -CJ    Climbing 3-5 steps with a railing? 2  -KG 3  -CJ    To walk in hospital room? 3  -KG 3  -CJ    AM-PAC 6 Clicks Score (PT) 18  -KG 20  -CJ    Highest Level of Mobility Goal 6 --> Walk 10 steps or more  -KG 6 --> Walk 10 steps or more  -CJ      Row Name 01/09/24 1540          Functional Assessment    Outcome Measure Options AM-PAC 6 Clicks Basic Mobility (PT)  -KG               User Key  (r) = Recorded By, (t) = Taken By, (c) = Cosigned By      Initials Name Provider Type    Isabella Bustos PT Physical Therapist    Denzel Walsh, RN Registered Nurse                                 Physical Therapy Education       Title: PT OT SLP Therapies (In Progress)       Topic: Physical Therapy (In Progress)       Point: Mobility training (In Progress)       Learning Progress Summary             Patient Acceptance, E, NR by KG at 1/9/2024 1432                         Point: Home exercise program (Not Started)       Learner Progress:  Not documented in this visit.              Point: Body mechanics (In Progress)       Learning Progress Summary             Patient Acceptance, E, NR by KG at 1/9/2024 1432                         Point: Precautions (In Progress)       Learning Progress  Summary             Patient Acceptance, E, NR by KG at 1/9/2024 1432                                         User Key       Initials Effective Dates Name Provider Type Discipline    KG 05/22/20 -  Isabella Partida, PT Physical Therapist PT                  PT Recommendation and Plan  Planned Therapy Interventions (PT): balance training, bed mobility training, gait training, strengthening, transfer training  Plan of Care Reviewed With: patient  Outcome Evaluation: PT initial evaluation completed for pt presenting with generalized weakness, c/o chest pain, SOA, and decreased functional mobility. Pt ambulated 25ft with CGA. Pt's decreased independence warrants PT skilled care. Recommend D/C home with assistance and OP PT services.     Time Calculation:   PT Evaluation Complexity  History, PT Evaluation Complexity: 1-2 personal factors and/or comorbidities  Examination of Body Systems (PT Eval Complexity): total of 3 or more elements  Clinical Presentation (PT Evaluation Complexity): stable  Clinical Decision Making (PT Evaluation Complexity): low complexity  Overall Complexity (PT Evaluation Complexity): low complexity     PT Charges       Row Name 01/09/24 1432             Time Calculation    Start Time 1432  -KG      PT Received On 01/09/24  -KG      PT Goal Re-Cert Due Date 01/19/24  -KG         Untimed Charges    PT Eval/Re-eval Minutes 46  -KG         Total Minutes    Untimed Charges Total Minutes 46  -KG       Total Minutes 46  -KG                User Key  (r) = Recorded By, (t) = Taken By, (c) = Cosigned By      Initials Name Provider Type    KG Isabella Partida, PT Physical Therapist                  Therapy Charges for Today       Code Description Service Date Service Provider Modifiers Qty    85570129632 HC PT EVAL LOW COMPLEXITY 4 1/9/2024 Isabella Partida, PT GP 1            PT G-Codes  Outcome Measure Options: AM-PAC 6 Clicks Basic Mobility (PT)  AM-PAC 6 Clicks Score (PT): 18  PT Discharge  Summary  Anticipated Discharge Disposition (PT): home with assist, home with outpatient therapy services    Irene Partida, PT  1/9/2024

## 2024-01-09 NOTE — PLAN OF CARE
Goal Outcome Evaluation:      VSS on 2LNC while sleeping. IV antibiotics infusing. PRN pain meds given. Will continue plan of care.

## 2024-01-09 NOTE — CONSULTS
CTS consult     Patient Care Team:  Provider, No Known as PCP - General  Referring MD:    Amaury Mchugh MD       Chief Complaint:  concern for empyema     HPI  Patient is a 33 y.o. male with a known history of IV drug use on chronic methadone, ongoing active vaping daily and obesity.  He was presented in the emergency department on 1/8/2024 and was admitted for further workup following presentation of right-sided chest pain.  He reported the emergency department after being seen in urgent treatment center and was found to have a fever, approximately 5 days prior to this admission.  Patient states that after being evaluated in urgent treatment center his condition has worsened.  States the pain was gets worse with activity requiring him to take deeper breaths.  Workup included a chest x-ray and CT scan which confirms the patient has a right-sided loculated pleural effusion.  He did undergo a thoracentesis which according to the notes revealed approximately 360 mL of cloudy fluid.  Fluid has been sent for cultures which is currently pending.  He has been started on IV antibiotic therapy and infectious disease consult has been obtained.  Urine drug screen was positive for methadone, fentanyl and opiate    Review of Systems  Pertinent items are noted in HPI.      History  Past Medical History:   Diagnosis Date    MRSA infection      History reviewed. No pertinent surgical history.  History reviewed. No pertinent family history.  Social History     Tobacco Use    Smoking status: Every Day     Types: Electronic Cigarette    Smokeless tobacco: Never   Vaping Use    Vaping Use: Unknown   Substance Use Topics    Drug use: Yes     Types: IV     Comment: fentanyl     Medications Prior to Admission   Medication Sig Dispense Refill Last Dose    lidocaine (LMX) 4 % cream Apply 1 application  topically to the appropriate area as directed As Needed for Mild Pain.   Past Week    methadone (DOLOPHINE) 10 MG tablet Take 9  tablets by mouth Daily,   1/8/2024    naproxen (NAPROSYN) 500 MG tablet Take 1 tablet by mouth 2 (Two) Times a Day With Meals.   1/7/2024     Allergies:  Patient has no known allergies.    Objective    Vital Signs  Temp:  [98.8 °F (37.1 °C)-101 °F (38.3 °C)] 100 °F (37.8 °C)  Heart Rate:  [] 107  Resp:  [18-24] 18  BP: (135-178)/() 154/91    Physical Exam:  General Appearance: Well-developed well-nourished, no acute distress  Head: Atraumatic normocephalic   neck: Supple without mass or bruit  Lungs: Clear to auscultation bilaterally somewhat decreased at the bases.  O2 sats 88 to 90% on 2 L nasal cannula arthrodesis L3-4 L5 1 from the TP lateral area    7.  Decompression  X-rays  Heart: Regular rhythm with slightly increased rate in the low 100s.  Sinus tachycardia noted on telemetry  Abdomen: Obese, nontender positive bowel sounds.  No masses bruits noted  Extremities: Warm to the touch no significant peripheral edema  Pulses: Palpable peripheral pulses throughout the upper and lower extremities a good quality  Skin: No rashes or lesions noted multiple tattoos throughout his body  Neurologic: Alert and oriented x 3 no focal deficit.  Moves all extremities          Data Review:  Results from last 7 days   Lab Units 01/09/24  0509   WBC 10*3/mm3 18.66*   HEMOGLOBIN g/dL 13.1   HEMATOCRIT % 38.7   PLATELETS 10*3/mm3 232     Results from last 7 days   Lab Units 01/09/24  0509   SODIUM mmol/L 131*   POTASSIUM mmol/L 4.2   CHLORIDE mmol/L 98   CO2 mmol/L 23.0   BUN mg/dL 13   CREATININE mg/dL 0.67*   GLUCOSE mg/dL 114*   CALCIUM mg/dL 8.8     Coagulation:   INR   Date Value Ref Range Status   01/09/2024 1.19 (H) 0.89 - 1.12 Final               Imaging Results (Last 72 Hours)       Procedure Component Value Units Date/Time    US Thoracentesis [[id number]] Collected: 01/08/24 1636    Specimen: Body Fluid Updated: 01/08/24 1640    Narrative:      DATE OF EXAM:  1/8/2024 4:00 PM EST    PROCEDURE:  US  THORACENTESIS    INDICATIONS:  Loculated effusion w/ chest pain, IVDU    COMPARISON:  No Comparisons Available    FLUOROSCOPIC TIME:  None    PHYSICIAN MONITORED CONSCIOUS SEDATION TIME:  None minutes    TECHNIQUE:   A detailed explanation of the procedure, including the risks, benefits, and alternatives was provided. A preprocedure timeout was performed. The interventional radiology nurse monitored the patient at all times. The patient was placed upright in the bed   and the right chest was ultrasounded, demonstrating a loculated right effusion. The right chest was then marked and prepped and draped in the usual sterile fashion. The skin and subcutaneous tissues were anesthetized with 1% lidocaine and a small skin   incision was made. Next, a 4 [place] centesis needle was advanced into the collection. A total of 360 mL of slightly cloudy fluid was aspirated and the needle was removed. The patient tolerated the procedure well, without immediate complication.    FINDINGS:  See above      Impression:        1. Right thoracentesis yielding 360 mL of slightly cloudy fluid.      Electronically Signed: Main Mcclure MD    1/8/2024 4:37 PM EST    Workstation ID: [id number]    XR Chest 1 View [[id number]] Collected: 01/08/24 1628     Updated: 01/08/24 1635    Narrative:      XR CHEST 1 VW    Date of Exam: 1/8/2024 4:09 PM EST    Indication: s/p thora    Comparison: CT chest from January 8, 2024    Findings:  There is a small loculated right pleural effusion with right basilar opacity. No pneumothorax is identified. The heart and mediastinal contours appear stable. The pulmonary vascular appears normal. The osseous structures appear intact.      Impression:      Impression:  1.Small loculated right pleural effusion.  2.Right basilar opacity, which could reflect atelectasis or pneumonia.  3.No pneumothorax identified.      Electronically Signed: Byron Maria MD    1/8/2024 4:32 PM EST    Workstation ID: [id number]    CT  Angiogram Chest [[id number]] Collected: 01/08/24 1252     Updated: 01/08/24 1258    Narrative:      CT ANGIOGRAM CHEST    Date of Exam: 1/8/2024 12:29 PM EST    Indication: chest pain, SOA, elevated d-dimer. r/o PE, infection. known IV drug user.    Comparison: None available.    Technique: CTA of the chest was performed after the uneventful intravenous administration of 85 mL Isovue-370. Reconstructed coronal and sagittal images were also obtained. In addition, a 3-D volume rendered image was created for interpretation.   Automated exposure control and iterative reconstruction methods were used.      Findings:  There is relatively more dense contrast in the SVC and thoracic aorta as compared to the pulmonary arteries and their branches. No large central embolism is identified although peripheral branches are not adequately evaluated on this exam. There are no   enlarged mediastinal nodes. There are calcified hilar nodes. There is a small partially loculated right pleural effusion. There is no left pleural effusion. There is compressive atelectasis of portions of the right middle and right lower lobes. There is   triangular atelectasis of the left lower lobe.      Impression:      Impression:  Exam is not adequate for detection of pulmonary embolism. Repeat scanning should be considered if clinically warranted.    Small loculated right pleural effusion. Areas of compressive atelectasis of the right middle and right lower lobes, and mild atelectasis of the left lower lobe.        Electronically Signed: Reyna Caldwell MD    1/8/2024 12:55 PM EST    Workstation ID: [id number]    XR Chest 1 View [[id number]] Collected: 01/08/24 0841     Updated: 01/08/24 0850    Narrative:      XR CHEST 1 VW    Date of Exam: 1/8/2024 8:26 AM EST    Indication: Chest Pain Triage Protocol    Comparison: None available.    Findings:  Lung volumes are diminished and there are perihilar interstitial opacities suggesting pulmonary edema pattern.  Small bilateral pleural effusions are evident, greater on the right. There is no distinct pneumothorax. The heart appears mildly enlarged.      Impression:      Impression:  Hypoventilatory changes and evidence of likely pulmonary edema with small bilateral pleural effusions.      Electronically Signed: Gio Marte MD    1/8/2024 8:47 AM EST    Workstation ID: [id number]              Assessment:    Small loculated right pleural effusion status post right thoracentesis with 360 mL cloudy fluid, cultures currently pending  Leukocytosis with elevated neutrophils  History of IV drug use.  Urine drug screen positive for fentanyl, methadone and opiate      Loculated pleural effusion    IVDU (intravenous drug user)    Pleural effusion        Plan:  Await results of pleural fluid cultures and blood cultures  Continue IV antibiotics  Chest x-rays and CT scan does confirm a loculated right pleural effusion.  This will likely need to be rectified with surgical decortication especially if cultures return positive.  Dr. Vargas is currently conferring with his office and reviewed his surgical schedule and likely will surgically intervene on Thursday if patient is willing to proceed.  Will make him n.p.o. after midnight on Wednesday    ARIANNA Javier  01/09/24  12:25 EST

## 2024-01-10 ENCOUNTER — ANESTHESIA EVENT (OUTPATIENT)
Dept: PERIOP | Facility: HOSPITAL | Age: 34
End: 2024-01-10
Payer: COMMERCIAL

## 2024-01-10 LAB
ABO GROUP BLD: NORMAL
ABO GROUP BLD: NORMAL
ANION GAP SERPL CALCULATED.3IONS-SCNC: 11 MMOL/L (ref 5–15)
BLD GP AB SCN SERPL QL: NEGATIVE
BUN SERPL-MCNC: 12 MG/DL (ref 6–20)
BUN/CREAT SERPL: 20.7 (ref 7–25)
CALCIUM SPEC-SCNC: 8.6 MG/DL (ref 8.6–10.5)
CHLORIDE SERPL-SCNC: 100 MMOL/L (ref 98–107)
CO2 SERPL-SCNC: 25 MMOL/L (ref 22–29)
CREAT SERPL-MCNC: 0.58 MG/DL (ref 0.76–1.27)
DEPRECATED RDW RBC AUTO: 39.8 FL (ref 37–54)
EGFRCR SERPLBLD CKD-EPI 2021: 132.1 ML/MIN/1.73
ERYTHROCYTE [DISTWIDTH] IN BLOOD BY AUTOMATED COUNT: 12.9 % (ref 12.3–15.4)
GLUCOSE SERPL-MCNC: 101 MG/DL (ref 65–99)
HCT VFR BLD AUTO: 35.4 % (ref 37.5–51)
HGB BLD-MCNC: 11.8 G/DL (ref 13–17.7)
INR PPP: 1.21 (ref 0.89–1.12)
MCH RBC QN AUTO: 28.2 PG (ref 26.6–33)
MCHC RBC AUTO-ENTMCNC: 33.3 G/DL (ref 31.5–35.7)
MCV RBC AUTO: 84.5 FL (ref 79–97)
PLATELET # BLD AUTO: 218 10*3/MM3 (ref 140–450)
PMV BLD AUTO: 10.1 FL (ref 6–12)
POTASSIUM SERPL-SCNC: 3.7 MMOL/L (ref 3.5–5.2)
PROTHROMBIN TIME: 15.4 SECONDS (ref 12.2–14.5)
QT INTERVAL: 318 MS
QT INTERVAL: 380 MS
QTC INTERVAL: 460 MS
QTC INTERVAL: 487 MS
RBC # BLD AUTO: 4.19 10*6/MM3 (ref 4.14–5.8)
RH BLD: POSITIVE
RH BLD: POSITIVE
SODIUM SERPL-SCNC: 136 MMOL/L (ref 136–145)
T&S EXPIRATION DATE: NORMAL
VANCOMYCIN TROUGH SERPL-MCNC: 4.8 MCG/ML (ref 5–20)
WBC NRBC COR # BLD AUTO: 17.71 10*3/MM3 (ref 3.4–10.8)

## 2024-01-10 PROCEDURE — 25010000002 VANCOMYCIN 10 G RECONSTITUTED SOLUTION

## 2024-01-10 PROCEDURE — 25010000002 KETOROLAC TROMETHAMINE PER 15 MG: Performed by: INTERNAL MEDICINE

## 2024-01-10 PROCEDURE — 80048 BASIC METABOLIC PNL TOTAL CA: CPT | Performed by: PHYSICIAN ASSISTANT

## 2024-01-10 PROCEDURE — 80202 ASSAY OF VANCOMYCIN: CPT

## 2024-01-10 PROCEDURE — 25010000002 VANCOMYCIN 750 MG RECONSTITUTED SOLUTION

## 2024-01-10 PROCEDURE — 97116 GAIT TRAINING THERAPY: CPT

## 2024-01-10 PROCEDURE — 25010000002 CEFTRIAXONE PER 250 MG: Performed by: INTERNAL MEDICINE

## 2024-01-10 PROCEDURE — 97530 THERAPEUTIC ACTIVITIES: CPT

## 2024-01-10 PROCEDURE — 99232 SBSQ HOSP IP/OBS MODERATE 35: CPT | Performed by: INTERNAL MEDICINE

## 2024-01-10 PROCEDURE — 85027 COMPLETE CBC AUTOMATED: CPT | Performed by: PHYSICIAN ASSISTANT

## 2024-01-10 PROCEDURE — 85610 PROTHROMBIN TIME: CPT | Performed by: PHYSICIAN ASSISTANT

## 2024-01-10 PROCEDURE — 86901 BLOOD TYPING SEROLOGIC RH(D): CPT | Performed by: PHYSICIAN ASSISTANT

## 2024-01-10 PROCEDURE — 25810000003 SODIUM CHLORIDE 0.9 % SOLUTION

## 2024-01-10 PROCEDURE — 86900 BLOOD TYPING SEROLOGIC ABO: CPT | Performed by: PHYSICIAN ASSISTANT

## 2024-01-10 PROCEDURE — 25010000002 MORPHINE PER 10 MG: Performed by: INTERNAL MEDICINE

## 2024-01-10 PROCEDURE — 86923 COMPATIBILITY TEST ELECTRIC: CPT

## 2024-01-10 PROCEDURE — 86850 RBC ANTIBODY SCREEN: CPT | Performed by: PHYSICIAN ASSISTANT

## 2024-01-10 PROCEDURE — 25010000002 VANCOMYCIN 1 G RECONSTITUTED SOLUTION

## 2024-01-10 PROCEDURE — 99024 POSTOP FOLLOW-UP VISIT: CPT

## 2024-01-10 RX ORDER — FAMOTIDINE 20 MG/1
20 TABLET, FILM COATED ORAL ONCE
Status: CANCELLED | OUTPATIENT
Start: 2024-01-10 | End: 2024-01-10

## 2024-01-10 RX ADMIN — KETOROLAC TROMETHAMINE 15 MG: 30 INJECTION INTRAMUSCULAR; INTRAVENOUS at 19:50

## 2024-01-10 RX ADMIN — VANCOMYCIN HYDROCHLORIDE 2250 MG: 10 INJECTION, POWDER, LYOPHILIZED, FOR SOLUTION INTRAVENOUS at 19:44

## 2024-01-10 RX ADMIN — KETOROLAC TROMETHAMINE 15 MG: 30 INJECTION INTRAMUSCULAR; INTRAVENOUS at 11:48

## 2024-01-10 RX ADMIN — METHADONE HYDROCHLORIDE 90 MG: 10 TABLET ORAL at 14:44

## 2024-01-10 RX ADMIN — CEFTRIAXONE 2000 MG: 2 INJECTION, POWDER, FOR SOLUTION INTRAMUSCULAR; INTRAVENOUS at 18:40

## 2024-01-10 RX ADMIN — MORPHINE SULFATE 4 MG: 4 INJECTION, SOLUTION INTRAMUSCULAR; INTRAVENOUS at 08:21

## 2024-01-10 RX ADMIN — KETOROLAC TROMETHAMINE 15 MG: 30 INJECTION INTRAMUSCULAR; INTRAVENOUS at 03:41

## 2024-01-10 RX ADMIN — ACETAMINOPHEN 650 MG: 325 TABLET ORAL at 19:43

## 2024-01-10 RX ADMIN — METRONIDAZOLE 500 MG: 500 TABLET ORAL at 05:42

## 2024-01-10 RX ADMIN — METRONIDAZOLE 500 MG: 500 TABLET ORAL at 14:44

## 2024-01-10 RX ADMIN — SENNOSIDES AND DOCUSATE SODIUM 2 TABLET: 8.6; 5 TABLET ORAL at 08:21

## 2024-01-10 RX ADMIN — METRONIDAZOLE 500 MG: 500 TABLET ORAL at 19:44

## 2024-01-10 RX ADMIN — MORPHINE SULFATE 4 MG: 4 INJECTION, SOLUTION INTRAMUSCULAR; INTRAVENOUS at 17:07

## 2024-01-10 RX ADMIN — VANCOMYCIN HYDROCHLORIDE 1250 MG: 10 INJECTION, POWDER, LYOPHILIZED, FOR SOLUTION INTRAVENOUS at 09:00

## 2024-01-10 RX ADMIN — OXYCODONE HYDROCHLORIDE 5 MG: 5 TABLET ORAL at 19:44

## 2024-01-10 NOTE — PROGRESS NOTES
[facility] Medicine Services  PROGRESS NOTE    Patient Name: Lion Solis  : 1990  MRN: [id number]    Date of Admission: 2024  Primary Care Physician: Provider, No Known    Subjective   Subjective     CC:  fever    HPI:  Ongoing fevers.  Right sided pleuritic chest pain continues.        Objective   Objective     Vital Signs:   Temp:  [98.9 °F (37.2 °C)-100.9 °F (38.3 °C)] 99.5 °F (37.5 °C)  Heart Rate:  [] 92  Resp:  [18] 18  BP: (130-154)/(71-93) 139/80  Flow (L/min):  [2] 2     Physical Exam:  Appears uncomfortable at times, in bed  MM moist  Obese  RRR  Diminished breath sounds in right lung fields  Abd soft, NT  Normal affect  Alert, speech clear    Results Reviewed:  LAB RESULTS:      Lab 01/10/24  0659 24  0509 24  1038 24  0838 24  0828 24  0814   WBC 17.71* 18.66*  --   --   --  17.97*   HEMOGLOBIN 11.8* 13.1  --   --   --  13.1   HEMATOCRIT 35.4* 38.7  --   --   --  39.4   PLATELETS 218 232  --   --   --  278   NEUTROS ABS  --  14.63*  --   --   --  14.38*   IMMATURE GRANS (ABS)  --  0.07*  --   --   --  0.09*   LYMPHS ABS  --  2.75  --   --   --  2.35   MONOS ABS  --  1.08*  --   --   --  0.93*   EOS ABS  --  0.08  --   --   --  0.17   MCV 84.5 84.1  --   --   --  84.9   PROCALCITONIN  --   --   --  0.21  --   --    LACTATE  --   --   --   --  1.2  --    LDH  --   --  240*  --   --   --    PROTIME 15.4* 15.3*  --   --   --   --    D DIMER QUANT  --   --   --   --   --  1.62*         Lab 01/10/24  0659 24  0509 24  0838   SODIUM 136 131* 136   POTASSIUM 3.7 4.2 3.9   CHLORIDE 100 98 100   CO2 25.0 23.0 26.0   ANION GAP 11.0 10.0 10.0   BUN 12 13 18   CREATININE 0.58* 0.67* 0.82   EGFR 132.1 126.4 119.0   GLUCOSE 101* 114* 112*   CALCIUM 8.6 8.8 9.0         Lab 24  0509 24  0838   TOTAL PROTEIN 7.6 7.9   ALBUMIN 3.5 3.9   GLOBULIN 4.1 4.0   ALT (SGPT) 52* 76*   AST (SGOT) 22 31   BILIRUBIN 1.0 0.6   ALK PHOS  117 89   LIPASE  --  14         Lab 01/10/24  0659 01/09/24  0509 01/08/24  1038 01/08/24  0838   PROBNP  --   --   --  78.6   HSTROP T  --   --  <6 <6   PROTIME 15.4* 15.3*  --   --    INR 1.21* 1.19*  --   --              Lab 01/10/24  0659   ABO TYPING A   RH TYPING Positive   ANTIBODY SCREEN Negative         Brief Urine Lab Results       None            Microbiology Results Abnormal       Procedure Component Value - Date/Time    Blood Culture - Blood, Arm, Left [[id number]]  (Normal) Collected: 01/08/24 0828    Lab Status: Preliminary result Specimen: Blood from Arm, Left Updated: 01/10/24 0845     Blood Culture No growth at 2 days    Narrative:      Less than seven (7) mL's of blood was collected.  Insufficient quantity may yield false negative results.    Body Fluid Culture - Body Fluid, Pleural Cavity [[id number]] Collected: 01/08/24 1618    Lab Status: Preliminary result Specimen: Body Fluid from Pleural Cavity Updated: 01/10/24 0819     Body Fluid Culture No growth at 2 days     Gram Stain Few (2+) WBCs seen      Few (2+) Red blood cells      No organisms seen    Blood Culture - Blood, Hand, Left [[id number]]  (Normal) Collected: 01/09/24 0525    Lab Status: Preliminary result Specimen: Blood from Hand, Left Updated: 01/10/24 0715     Blood Culture No growth at 24 hours    Blood Culture - Blood, Arm, Right [[id number]]  (Normal) Collected: 01/08/24 1434    Lab Status: Preliminary result Specimen: Blood from Arm, Right Updated: 01/09/24 1500     Blood Culture No growth at 24 hours    Narrative:      Aerobic Bottle Only    Less than seven (7) mL's of blood was collected.  Insufficient quantity may yield false negative results.    Fungus Smear - Body Fluid, Pleural Cavity [[id number]] Collected: 01/08/24 1618    Lab Status: Final result Specimen: Body Fluid from Pleural Cavity Updated: 01/09/24 1335     Fungal Stain No fungal elements seen    AFB Culture - Body Fluid, Pleural Cavity [[id number]] Collected: 01/08/24  1618    Lab Status: Preliminary result Specimen: Body Fluid from Pleural Cavity Updated: 01/09/24 1306     AFB Stain No acid fast bacilli seen on concentrated smear    COVID PRE-OP / PRE-PROCEDURE SCREENING ORDER (NO ISOLATION) - Swab, Nasopharynx [[id number]]  (Normal) Collected: 01/08/24 0805    Lab Status: Final result Specimen: Swab from Nasopharynx Updated: 01/08/24 0915    Narrative:      The following orders were created for panel order COVID PRE-OP / PRE-PROCEDURE SCREENING ORDER (NO ISOLATION) - Swab, Nasopharynx.  Procedure                               Abnormality         Status                     ---------                               -----------         ------                     Respiratory Panel PCR w/...[[id number]]  Normal              Final result                 Please view results for these tests on the individual orders.    Respiratory Panel PCR w/COVID-19(SARS-CoV-2) VERONICA/MARCELO/ISIDRO/PAD/COR/JOHN In-House, NP Swab in UTM/VTM, 2 HR TAT - Swab, Nasopharynx [[id number]]  (Normal) Collected: 01/08/24 0805    Lab Status: Final result Specimen: Swab from Nasopharynx Updated: 01/08/24 0915     ADENOVIRUS, PCR Not Detected     Coronavirus 229E Not Detected     Coronavirus HKU1 Not Detected     Coronavirus NL63 Not Detected     Coronavirus OC43 Not Detected     COVID19 Not Detected     Human Metapneumovirus Not Detected     Human Rhinovirus/Enterovirus Not Detected     Influenza A PCR Not Detected     Influenza B PCR Not Detected     Parainfluenza Virus 1 Not Detected     Parainfluenza Virus 2 Not Detected     Parainfluenza Virus 3 Not Detected     Parainfluenza Virus 4 Not Detected     RSV, PCR Not Detected     Bordetella pertussis pcr Not Detected     Bordetella parapertussis PCR Not Detected     Chlamydophila pneumoniae PCR Not Detected     Mycoplasma pneumo by PCR Not Detected    Narrative:      In the setting of a positive respiratory panel with a viral infection PLUS a negative procalcitonin without  other underlying concern for bacterial infection, consider observing off antibiotics or discontinuation of antibiotics and continue supportive care. If the respiratory panel is positive for atypical bacterial infection (Bordetella pertussis, Chlamydophila pneumoniae, or Mycoplasma pneumoniae), consider antibiotic de-escalation to target atypical bacterial infection.            Adult Transthoracic Echo Complete w/ Color, Spectral and Contrast if Necessary Per Protocol    Result Date: 1/9/2024    Left ventricular systolic function is normal. Calculated left ventricular EF = 57.8% Left ventricular ejection fraction appears to be 56 - 60%.   Estimated right ventricular systolic pressure from tricuspid regurgitation is normal (<35 mmHg).   Mild MR     Duplex Venous Lower Extremity - Bilateral CAR    Result Date: 1/9/2024    Normal bilateral lower extremity venous duplex scan.     US Thoracentesis    Result Date: 1/8/2024  DATE OF EXAM: 1/8/2024 4:00 PM EST PROCEDURE: US THORACENTESIS INDICATIONS: Loculated effusion w/ chest pain, IVDU COMPARISON: No Comparisons Available FLUOROSCOPIC TIME: None PHYSICIAN MONITORED CONSCIOUS SEDATION TIME: None minutes TECHNIQUE: A detailed explanation of the procedure, including the risks, benefits, and alternatives was provided. A preprocedure timeout was performed. The interventional radiology nurse monitored the patient at all times. The patient was placed upright in the bed and the right chest was ultrasounded, demonstrating a loculated right effusion. The right chest was then marked and prepped and draped in the usual sterile fashion. The skin and subcutaneous tissues were anesthetized with 1% lidocaine and a small skin incision was made. Next, a 4 Maltese centesis needle was advanced into the collection. A total of 360 mL of slightly cloudy fluid was aspirated and the needle was removed. The patient tolerated the procedure well, without immediate complication. FINDINGS: See above      Impression: 1. Right thoracentesis yielding 360 mL of slightly cloudy fluid. Electronically Signed: Main Mcclure MD  1/8/2024 4:37 PM EST  Workstation ID: [id number]    XR Chest 1 View    Result Date: 1/8/2024  XR CHEST 1 VW Date of Exam: 1/8/2024 4:09 PM EST Indication: s/p thora Comparison: CT chest from January 8, 2024 Findings: There is a small loculated right pleural effusion with right basilar opacity. No pneumothorax is identified. The heart and mediastinal contours appear stable. The pulmonary vascular appears normal. The osseous structures appear intact.     Impression: Impression: 1.Small loculated right pleural effusion. 2.Right basilar opacity, which could reflect atelectasis or pneumonia. 3.No pneumothorax identified. Electronically Signed: Byron Maria MD  1/8/2024 4:32 PM EST  Workstation ID: [id number]     Results for orders placed during the hospital encounter of 01/08/24    Adult Transthoracic Echo Complete w/ Color, Spectral and Contrast if Necessary Per Protocol    Interpretation Summary    Left ventricular systolic function is normal. Calculated left ventricular EF = 57.8% Left ventricular ejection fraction appears to be 56 - 60%.    Estimated right ventricular systolic pressure from tricuspid regurgitation is normal (<35 mmHg).    Mild MR      Current medications:  Scheduled Meds:[START ON 1/11/2024] ceFAZolin, 3,000 mg, Intravenous, Once  cefTRIAXone, 2,000 mg, Intravenous, Q24H  methadone, 90 mg, Oral, Daily  metroNIDAZOLE, 500 mg, Oral, Q8H  senna-docusate sodium, 2 tablet, Oral, BID  sodium chloride, 10 mL, Intravenous, Q12H  vancomycin, 2,250 mg, Intravenous, Q12H      Continuous Infusions:Pharmacy to dose vancomycin,       PRN Meds:.  acetaminophen **OR** acetaminophen **OR** acetaminophen    senna-docusate sodium **AND** polyethylene glycol **AND** bisacodyl **AND** bisacodyl    Calcium Replacement - Follow Nurse / BPA Driven Protocol    ketorolac    Magnesium Standard Dose  Replacement - Follow Nurse / BPA Driven Protocol    melatonin    Morphine    nicotine    oxyCODONE    Pharmacy to dose vancomycin    Phosphorus Replacement - Follow Nurse / BPA Driven Protocol    Potassium Replacement - Follow Nurse / BPA Driven Protocol    sodium chloride    sodium chloride    Assessment & Plan   Assessment & Plan     Active Hospital Problems    Diagnosis  POA    **Loculated pleural effusion [J90]  Yes    Pleural effusion [J90]  Yes    IVDU (intravenous drug user) [F19.90]  Yes      Resolved Hospital Problems   No resolved problems to display.        Brief Hospital Course to date:  Lion Solis is a 33 y.o. male with h/o obesity and IVDU presents with right sided chest pain    Pleural effusion, possible empyema  - exudate, cultures pending  - empiric antibiotics  - may need decortication, CT surgery follows    Positive blood culture  - coag negative staph, possible contaminant    IVDU  - continue methadone  - addiction counseling    Expected Discharge Location and Transportation:   Expected Discharge   Expected Discharge Date: 1/12/2024; Expected Discharge Time:      DVT prophylaxis:  Mechanical DVT prophylaxis orders are present.     AM-PAC 6 Clicks Score (PT): 19 (01/10/24 1137)    CODE STATUS:   Code Status and Medical Interventions:   Ordered at: 01/08/24 4536     Code Status (Patient has no pulse and is not breathing):    CPR (Attempt to Resuscitate)     Medical Interventions (Patient has pulse or is breathing):    Full Support       Amaury Mchugh MD  01/10/24

## 2024-01-10 NOTE — CASE MANAGEMENT/SOCIAL WORK
Continued Stay Note   Par[facility]     Patient Name: Lion Solis  MRN: [id number]  Today's Date: 1/10/2024    Admit Date: 1/8/2024    Plan: Ongoing   Discharge Plan       Row Name 01/10/24 1204       Plan    Plan Ongoing    Patient/Family in Agreement with Plan yes    Plan Comments Discussed in MDR today. Patient is having a Right thoroctomy tomorrow. Plan is ongoing. CM will continue to follow.    Final Discharge Disposition Code 30 - still a patient                   Discharge Codes    No documentation.                 Expected Discharge Date and Time       Expected Discharge Date Expected Discharge Time    Jan 12, 2024               Arpit Escoto RN

## 2024-01-10 NOTE — PROGRESS NOTES
[facility] Cardiothoracic Surgery In-Patient Progress Note       LOS: 1 day     Chief Complaint: Loculated right effusion    Subjective  Tmax 100.9 overnight.  On 2 L saturations 94%.      Objective  Vital Signs  Temp:  [98.9 °F (37.2 °C)-100.9 °F (38.3 °C)] 99 °F (37.2 °C)  Heart Rate:  [] 96  Resp:  [18] 18  BP: (137-178)/(71-98) 151/93        Physical Exam:   General Appearance: alert, appears stated age and cooperative   Lungs: clear to auscultation, respirations regular, respirations even, and respirations unlabored   Heart: regular rhythm & normal rate, normal S1, S2, no murmur, no gallop, no rub, and no click     Results     Results from last 7 days   Lab Units 01/09/24  0509   WBC 10*3/mm3 18.66*   HEMOGLOBIN g/dL 13.1   HEMATOCRIT % 38.7   PLATELETS 10*3/mm3 232     Results from last 7 days   Lab Units 01/09/24  0509   SODIUM mmol/L 131*   POTASSIUM mmol/L 4.2   CHLORIDE mmol/L 98   CO2 mmol/L 23.0   BUN mg/dL 13   CREATININE mg/dL 0.67*   GLUCOSE mg/dL 114*   CALCIUM mg/dL 8.8     Echo:  Interpretation Summary         Left ventricular systolic function is normal. Calculated left ventricular EF = 57.8% Left ventricular ejection fraction appears to be 56 - 60%.    Estimated right ventricular systolic pressure from tricuspid regurgitation is normal (<35 mmHg).    Mild MR    Assessment    Loculated pleural effusion    IVDU (intravenous drug user)    Pleural effusion      Plan   Blood cultures from 1/8 positive for Staphylococcus coagulase-negative, repeat cultures pending  Preop for right thoracotomy and decortication tomorrow Dr. Vargas  N.p.o. after midnight  Continue antibiotics per JUNIOR Man, APRN  01/10/24  07:18 EST

## 2024-01-10 NOTE — CONSULTS
INFECTIOUS DISEASE Progress Note    Lion Solis  1990  [id number]    Date of consult: 1/9/24    Admit date: 1/8/2024    Requesting Provider: Trae Zayas MD   Evaluating physician: Byron Curiel MD  Reason for Consultation:     IVDU, pleural effusion, GPC in blood     Chief Complaint: Chest pain      Subjective   History of present illness:  Lion Solis is a  33 y.o.  Yr old male with obesity, h/o vaping, chronic methadone use, and IV drug use with fentanyl who presented on 1/8 for evaluation of right-sided chest pain. He did have a recent upper respiratory illness resolved about 10 days prior to arrival.  About 5 days prior to arrival he developed right-sided chest pain was evaluated in urgent treatment center and was noted to have a fever up to 101°F.  He was diagnosed with a muscle strain was given a steroid shot and naproxen.  His pain will progressed to the point where he presented to the ER yesterday due to increased pain.  The pain on the right side of his chest is pleuritic/worse with deep inspiration.  He is not having any sputum production.      Since arrival, T-max has been 101°F.  He has been on 2 L nasal cannula supplemental O2 with SPO2 in the low 90s.  He was initially tachycardic up to 126 bpm but this is improved. White blood cell count was initially 17.97 but is trended up slightly to 18.66. MRSA PCR nares is positive. Urine drug screen was positive for methadone, opiates, and fentanyl. Respiratory viral PCR panel was negative. One blood culture set is positive for coagulase-negative staph with final ID in progress. Repeat blood cultures were sent today. HIV fourth-generation screen was negative. Chest x-ray showed bilateral pleural effusions.  CTA chest showed small loculated right pleural effusion and areas of compressive atelectasis of the right middle and right lower lobes and mild atelectasis of the left lower lobe. Right-sided thoracentesis procedure was done yesterday  yielding 360 cc of slightly cloudy fluid. Pleural fluid protein was elevated to 5.9 and LDH was elevated to 809 consistent with an exudate.  Pleural fluid Gram stain showed no organisms and culture is preliminarily no growth to date. A TTE was ordered and pending. The patient has been on IV vancomycin and ceftriaxone by the primary team. ID has been consult for antibiotic recommendations in the setting of the patient's positive blood culture, IV drug abuse, and pleural effusion that is exudative.    Subjective:    1/10/24: Patient is feeling slightly better today. Did have some fevers up to 100.9°F last night but has been afebrile today.  He is on 2 L nasal cannula Supplemental O2.  Still with a cough but no worse.  Still with some right-sided pleuritic chest pain.  CT surgery has evaluated the patient and plans for thoracotomy with decortication tomorrow.    Past Medical History:   Diagnosis Date    MRSA infection        History reviewed. No pertinent surgical history.    Pediatric History   Patient Parents    Not on file     Other Topics Concern    Not on file   Social History Narrative    Not on file   The patient uses IV drugs.  He claims to use heroin but he notes that fentanyl was positive and his UDS and it was probably fentanyl.  He does Vape. He is currently unemployed.  He is .    family history is not on file.    No Known Allergies      There is no immunization history on file for this patient.    Medication:    Current Facility-Administered Medications:     !Please draw vancomycin trough level on 1/11 at 0800 prior to 0900 dose, , Does not apply, Daily, Kirit Felipe, RHODA    acetaminophen (TYLENOL) tablet 650 mg, 650 mg, Oral, Q4H PRN **OR** acetaminophen (TYLENOL) 160 MG/5ML oral solution 650 mg, 650 mg, Oral, Q4H PRN, 650 mg at 01/09/24 2001 **OR** acetaminophen (TYLENOL) suppository 650 mg, 650 mg, Rectal, Q4H PRN, Luis Larson,     sennosides-docusate (PERICOLACE) 8.6-50 MG per  tablet 2 tablet, 2 tablet, Oral, BID, 2 tablet at 01/10/24 0821 **AND** polyethylene glycol (MIRALAX) packet 17 g, 17 g, Oral, Daily PRN **AND** bisacodyl (DULCOLAX) EC tablet 5 mg, 5 mg, Oral, Daily PRN **AND** bisacodyl (DULCOLAX) suppository 10 mg, 10 mg, Rectal, Daily PRN, Luis Larson DO    Calcium Replacement - Follow Nurse / BPA Driven Protocol, , Does not apply, PRN, Luis Larson DO    [START ON 1/11/2024] ceFAZolin in Sodium Chloride (ANCEF) IVPB solution 3,000 mg, 3,000 mg, Intravenous, Once, Gaudencio Justice PA    cefTRIAXone (ROCEPHIN) 2,000 mg in sodium chloride 0.9 % 100 mL IVPB, 2,000 mg, Intravenous, Q24H, Luis Larson DO, Last Rate: 200 mL/hr at 01/09/24 1724, 2,000 mg at 01/09/24 1724    ketorolac (TORADOL) injection 15 mg, 15 mg, Intravenous, Q6H PRN, Luis Larson DO, 15 mg at 01/10/24 1148    Magnesium Standard Dose Replacement - Follow Nurse / BPA Driven Protocol, , Does not apply, PRN, Luis Larson DO    melatonin tablet 10 mg, 10 mg, Oral, Nightly PRN, Fernanda Zamora DO, 10 mg at 01/09/24 2227    methadone (DOLOPHINE) tablet 90 mg, 90 mg, Oral, Daily, Luis Larson DO, 90 mg at 01/10/24 1444    metroNIDAZOLE (FLAGYL) tablet 500 mg, 500 mg, Oral, Q8H, Byron Curiel MD, 500 mg at 01/10/24 1444    Morphine sulfate (PF) injection 4 mg, 4 mg, Intravenous, Q4H PRN, Luis Larson DO, 4 mg at 01/10/24 1707    nicotine (NICODERM CQ) 21 MG/24HR patch 1 patch, 1 patch, Transdermal, Daily PRN, Larson, Luis Ray, DO    oxyCODONE (ROXICODONE) immediate release tablet 5 mg, 5 mg, Oral, Q6H PRN, Luis Larson, DO    Pharmacy to dose vancomycin, , Does not apply, Continuous PRNMarsha Daniel Alan,     Phosphorus Replacement - Follow Nurse / BPA Driven Protocol, , Does not apply, PRNMarsha Daniel Alan,     Potassium Replacement - Follow Nurse / BPA Driven Protocol, , Does not apply, PRN, Luis Larson, DO    sodium  "chloride 0.9 % flush 10 mL, 10 mL, Intravenous, Q12H, Luis Larson, DO, 10 mL at 01/09/24 0850    sodium chloride 0.9 % flush 10 mL, 10 mL, Intravenous, PRN, Luis Larson, DO    sodium chloride 0.9 % infusion 40 mL, 40 mL, Intravenous, PRN, Luis Larson Alan, DO    vancomycin 2250 mg/500 mL 0.9% NS IVPB ([facility]), 2,250 mg, Intravenous, Q12H, Kirit Felipe, RPH    Please refer to the medical record for a full medication list    Review of Systems:    As above    Physical Exam:   Vital Signs   Temp:  [98.1 °F (36.7 °C)-100.9 °F (38.3 °C)] 98.1 °F (36.7 °C)  Heart Rate:  [89-98] 94  Resp:  [18] 18  BP: (130-151)/(80-93) 137/81    Temp  Min: 98.1 °F (36.7 °C)  Max: 100.9 °F (38.3 °C)  BP  Min: 130/86  Max: 151/93  Pulse  Min: 89  Max: 98  Resp  Min: 18  Max: 18  SpO2  Min: 92 %  Max: 95 %    Blood pressure 137/81, pulse 94, temperature 98.1 °F (36.7 °C), temperature source Oral, resp. rate 18, height 175 cm (68.9\"), weight 132 kg (291 lb 0.1 oz), SpO2 94%.  GENERAL: Awake and alert, No acute distress. Appears stated age. Morbidly obese.  HEENT:  Normocephalic, atraumatic.  No external oral lesions noted  EYES: PERRL. No conjunctival injection. No icterus.   HEART: RRR, no murmur  LUNGS: Diminished breath sounds in the right lung base. Nonlabored breathing on 2 L nasal cannula  ABDOMEN: Nondistended  GENITAL: no Talavera catheter  SKIN: no generalized rashes.  No peripheral stigmata of infective endocarditis noted. Does have multiple tattoos over his body including his torso.  PSYCHIATRIC: cooperative.  Appropriate mood and affect  EXT:  No cellulitic change.  NEURO: awake alert and oriented ×4.  Normal speech and cognition    Results Review:   I reviewed the patient's new clinical results.  I reviewed the patient's new imaging results and agree with the interpretation.  I reviewed the patient's other test results and agree with the interpretation    Results from last 7 days   Lab Units 01/10/24  0659 " "01/09/24  0509 01/08/24  0814   WBC 10*3/mm3 17.71* 18.66* 17.97*   HEMOGLOBIN g/dL 11.8* 13.1 13.1   HEMATOCRIT % 35.4* 38.7 39.4   PLATELETS 10*3/mm3 218 232 278     Results from last 7 days   Lab Units 01/10/24  0659   SODIUM mmol/L 136   POTASSIUM mmol/L 3.7   CHLORIDE mmol/L 100   CO2 mmol/L 25.0   BUN mg/dL 12   CREATININE mg/dL 0.58*   GLUCOSE mg/dL 101*   CALCIUM mg/dL 8.6     Results from last 7 days   Lab Units 01/09/24  0509   ALK PHOS U/L 117   BILIRUBIN mg/dL 1.0   ALT (SGPT) U/L 52*   AST (SGOT) U/L 22             Results from last 7 days   Lab Units 01/10/24  0659   VANCOMYCIN TR mcg/mL 4.80*     Results from last 7 days   Lab Units 01/08/24  0828   LACTATE mmol/L 1.2     Estimated Creatinine Clearance: 243.7 mL/min (A) (by C-G formula based on SCr of 0.58 mg/dL (L)).     Procalitonin Results:      Lab 01/08/24  0838   PROCALCITONIN 0.21      Brief Urine Lab Results       None           No results found for: \"SITE\", \"ALLENTEST\", \"PHART\", \"HIZ9PHA\", \"PO2ART\", \"SEN8AKV\", \"BASEEXCESS\", \"T2CMGOBQ\", \"HGBBG\", \"HCTABG\", \"OXYHEMOGLOBI\", \"METHHGBN\", \"CARBOXYHGB\", \"CO2CT\", \"BAROMETRIC\", \"MODALITY\", \"FIO2\"     Microbiology:  Blood Culture   Date Value Ref Range Status   01/08/2024 Abnormal Stain (C)  Preliminary     BCID, PCR   Date Value Ref Range Status   01/08/2024 (A) Negative by BCID PCR. Culture to Follow. Final    Staph spp, not aureus or lugdunensis. Identification by BCID2 PCR.       Blood Culture   Date Value Ref Range Status   01/08/2024 Abnormal Stain (C)  Preliminary     Body Fluid Culture   Date Value Ref Range Status   01/08/2024 No growth  Preliminary     BCID, PCR   Date Value Ref Range Status   01/08/2024 (A) Negative by BCID PCR. Culture to Follow. Final    Staph spp, not aureus or lugdunensis. Identification by BCID2 PCR.   (      Radiology:  Imaging Results (Last 72 Hours)       Procedure Component Value Units Date/Time    US Thoracentesis [[id number]] Collected: 01/08/24 1636    Specimen: " Body Fluid Updated: 01/08/24 1640    Narrative:      DATE OF EXAM:  1/8/2024 4:00 PM EST    PROCEDURE:  US THORACENTESIS    INDICATIONS:  Loculated effusion w/ chest pain, IVDU    COMPARISON:  No Comparisons Available    FLUOROSCOPIC TIME:  None    PHYSICIAN MONITORED CONSCIOUS SEDATION TIME:  None minutes    TECHNIQUE:   A detailed explanation of the procedure, including the risks, benefits, and alternatives was provided. A preprocedure timeout was performed. The interventional radiology nurse monitored the patient at all times. The patient was placed upright in the bed   and the right chest was ultrasounded, demonstrating a loculated right effusion. The right chest was then marked and prepped and draped in the usual sterile fashion. The skin and subcutaneous tissues were anesthetized with 1% lidocaine and a small skin   incision was made. Next, a 4 Serbian centesis needle was advanced into the collection. A total of 360 mL of slightly cloudy fluid was aspirated and the needle was removed. The patient tolerated the procedure well, without immediate complication.    FINDINGS:  See above      Impression:        1. Right thoracentesis yielding 360 mL of slightly cloudy fluid.      Electronically Signed: Main Mcclure MD    1/8/2024 4:37 PM EST    Workstation ID: [id number]    XR Chest 1 View [[id number]] Collected: 01/08/24 1628     Updated: 01/08/24 1635    Narrative:      XR CHEST 1 VW    Date of Exam: 1/8/2024 4:09 PM EST    Indication: s/p thora    Comparison: CT chest from January 8, 2024    Findings:  There is a small loculated right pleural effusion with right basilar opacity. No pneumothorax is identified. The heart and mediastinal contours appear stable. The pulmonary vascular appears normal. The osseous structures appear intact.      Impression:      Impression:  1.Small loculated right pleural effusion.  2.Right basilar opacity, which could reflect atelectasis or pneumonia.  3.No pneumothorax  identified.      Electronically Signed: Byron Maria MD    1/8/2024 4:32 PM EST    Workstation ID: [id number]    CT Angiogram Chest [[id number]] Collected: 01/08/24 1252     Updated: 01/08/24 1258    Narrative:      CT ANGIOGRAM CHEST    Date of Exam: 1/8/2024 12:29 PM EST    Indication: chest pain, SOA, elevated d-dimer. r/o PE, infection. known IV drug user.    Comparison: None available.    Technique: CTA of the chest was performed after the uneventful intravenous administration of 85 mL Isovue-370. Reconstructed coronal and sagittal images were also obtained. In addition, a 3-D volume rendered image was created for interpretation.   Automated exposure control and iterative reconstruction methods were used.      Findings:  There is relatively more dense contrast in the SVC and thoracic aorta as compared to the pulmonary arteries and their branches. No large central embolism is identified although peripheral branches are not adequately evaluated on this exam. There are no   enlarged mediastinal nodes. There are calcified hilar nodes. There is a small partially loculated right pleural effusion. There is no left pleural effusion. There is compressive atelectasis of portions of the right middle and right lower lobes. There is   triangular atelectasis of the left lower lobe.      Impression:      Impression:  Exam is not adequate for detection of pulmonary embolism. Repeat scanning should be considered if clinically warranted.    Small loculated right pleural effusion. Areas of compressive atelectasis of the right middle and right lower lobes, and mild atelectasis of the left lower lobe.        Electronically Signed: Reyna Caldwell MD    1/8/2024 12:55 PM EST    Workstation ID: [id number]    XR Chest 1 View [[id number]] Collected: 01/08/24 0841     Updated: 01/08/24 0850    Narrative:      XR CHEST 1 VW    Date of Exam: 1/8/2024 8:26 AM EST    Indication: Chest Pain Triage Protocol    Comparison: None  available.    Findings:  Lung volumes are diminished and there are perihilar interstitial opacities suggesting pulmonary edema pattern. Small bilateral pleural effusions are evident, greater on the right. There is no distinct pneumothorax. The heart appears mildly enlarged.      Impression:      Impression:  Hypoventilatory changes and evidence of likely pulmonary edema with small bilateral pleural effusions.      Electronically Signed: Gio Marte MD    1/8/2024 8:47 AM EST    Workstation ID: [id number]            IMPRESSION:     Problems:  Sepsis with fevers, tachycardia, and leukocytosis-Suspect possibly due to an empyema on the right side. One blood culture is positive for coagulase-negative staph and could be true Septicemia versus a contaminanted culture.  He does have risk for bacteremia and infective endocarditis in the setting of his recent IV drug abuse. TTE has been ordered and is in progress.  Right-sided Exudative pleural effusion, possible empyema   Coagulase-negative staph in 1 blood culture set so far-unclear if this is a contaminate or true infection.  We will monitor blood cultures including surveillance cultures.  Currently covered with vancomycin  IV drug abuse  MRSA nasal colonization    RECOMMENDATIONS:    -Continue to follow CBC and BMP  -Continue to follow blood cultures-I asked the micro lab to work his coagulase-negative staph up further.  -Continue to monitor pleural fluid cultures-no growth to date  -CT surgery has been counseled to-plan for Right thoracotomy and decortication procedure tomorrow  -TTE was without any vegetations  -Continue ceftriaxone 2 g IV every 24 hours and IV vancomycin with pharmacy dosing assistance for now.  -Continue flagyl 500 mg PO TID for some anaerobic coverage for possible empyema    The patient is not a good candidate for outpatient IV antibiotics with a PICC line in the setting of his recent IV drug abuse.  I discussed this with him today.    Thank you for  asking me to see Lion Solis.      Complex MDM    Byron Curiel MD  1/10/2024

## 2024-01-10 NOTE — PLAN OF CARE
Goal Outcome Evaluation:  Plan of Care Reviewed With: patient, spouse        Progress: improving  Outcome Evaluation: Pt increased ambulation distance to 60' with CGA. Pt continues to present below his functional baseline with weakness, acute pain, and SOA. Further IPPT is warrented. PT will progress as able per POC.      Anticipated Discharge Disposition (PT): home with assist, home with outpatient therapy services

## 2024-01-10 NOTE — PROGRESS NOTES
[facility] Medicine Services  PROGRESS NOTE    Patient Name: Lion Solis  : 1990  MRN: [id number]    Date of Admission: 2024  Primary Care Physician: Provider, No Known    Subjective   Subjective     CC:  fever    HPI:  Still having right sided pleuritic chest pain      Objective   Objective     Vital Signs:   Temp:  [99.1 °F (37.3 °C)-101 °F (38.3 °C)] 100.8 °F (38.2 °C)  Heart Rate:  [] 93  Resp:  [18] 18  BP: (135-178)/(71-98) 154/71  Flow (L/min):  [2] 2     Physical Exam:  Appears uncomfortable, in bed  MM moist  RRR  Breath sounds grossly clear, poor effort  Abd soft, NT  Obese  Normal affect  Alert, speech clear    Results Reviewed:  LAB RESULTS:      Lab 24  0509 24  1038 24  0838 24  0828 24  0814   WBC 18.66*  --   --   --  17.97*   HEMOGLOBIN 13.1  --   --   --  13.1   HEMATOCRIT 38.7  --   --   --  39.4   PLATELETS 232  --   --   --  278   NEUTROS ABS 14.63*  --   --   --  14.38*   IMMATURE GRANS (ABS) 0.07*  --   --   --  0.09*   LYMPHS ABS 2.75  --   --   --  2.35   MONOS ABS 1.08*  --   --   --  0.93*   EOS ABS 0.08  --   --   --  0.17   MCV 84.1  --   --   --  84.9   PROCALCITONIN  --   --  0.21  --   --    LACTATE  --   --   --  1.2  --    LDH  --  240*  --   --   --    PROTIME 15.3*  --   --   --   --    D DIMER QUANT  --   --   --   --  1.62*         Lab 24  0509 24  0838   SODIUM 131* 136   POTASSIUM 4.2 3.9   CHLORIDE 98 100   CO2 23.0 26.0   ANION GAP 10.0 10.0   BUN 13 18   CREATININE 0.67* 0.82   EGFR 126.4 119.0   GLUCOSE 114* 112*   CALCIUM 8.8 9.0         Lab 24  0509 24  0838   TOTAL PROTEIN 7.6 7.9   ALBUMIN 3.5 3.9   GLOBULIN 4.1 4.0   ALT (SGPT) 52* 76*   AST (SGOT) 22 31   BILIRUBIN 1.0 0.6   ALK PHOS 117 89   LIPASE  --  14         Lab 24  0509 24  1038 24  0838   PROBNP  --   --  78.6   HSTROP T  --  <6 <6   PROTIME 15.3*  --   --    INR 1.19*  --   --                   Brief Urine Lab Results       None            Microbiology Results Abnormal       Procedure Component Value - Date/Time    Blood Culture - Blood, Arm, Right [[id number]]  (Normal) Collected: 01/08/24 1434    Lab Status: Preliminary result Specimen: Blood from Arm, Right Updated: 01/09/24 1500     Blood Culture No growth at 24 hours    Narrative:      Aerobic Bottle Only    Less than seven (7) mL's of blood was collected.  Insufficient quantity may yield false negative results.    Fungus Smear - Body Fluid, Pleural Cavity [[id number]] Collected: 01/08/24 1618    Lab Status: Final result Specimen: Body Fluid from Pleural Cavity Updated: 01/09/24 1335     Fungal Stain No fungal elements seen    AFB Culture - Body Fluid, Pleural Cavity [[id number]] Collected: 01/08/24 1618    Lab Status: Preliminary result Specimen: Body Fluid from Pleural Cavity Updated: 01/09/24 1306     AFB Stain No acid fast bacilli seen on concentrated smear    Blood Culture - Blood, Arm, Left [[id number]]  (Normal) Collected: 01/08/24 0828    Lab Status: Preliminary result Specimen: Blood from Arm, Left Updated: 01/09/24 0845     Blood Culture No growth at 24 hours    Narrative:      Less than seven (7) mL's of blood was collected.  Insufficient quantity may yield false negative results.    Body Fluid Culture - Body Fluid, Pleural Cavity [[id number]] Collected: 01/08/24 1618    Lab Status: Preliminary result Specimen: Body Fluid from Pleural Cavity Updated: 01/09/24 0808     Body Fluid Culture No growth     Gram Stain Few (2+) WBCs seen      Few (2+) Red blood cells      No organisms seen    COVID PRE-OP / PRE-PROCEDURE SCREENING ORDER (NO ISOLATION) - Swab, Nasopharynx [[id number]]  (Normal) Collected: 01/08/24 0805    Lab Status: Final result Specimen: Swab from Nasopharynx Updated: 01/08/24 0915    Narrative:      The following orders were created for panel order COVID PRE-OP / PRE-PROCEDURE SCREENING ORDER (NO ISOLATION) - Swab,  Nasopharynx.  Procedure                               Abnormality         Status                     ---------                               -----------         ------                     Respiratory Panel PCR w/...[[id number]]  Normal              Final result                 Please view results for these tests on the individual orders.    Respiratory Panel PCR w/COVID-19(SARS-CoV-2) VERONICA/MARCELO/ISIDRO/PAD/COR/JOHN In-House, NP Swab in UTM/VTM, 2 HR TAT - Swab, Nasopharynx [[id number]]  (Normal) Collected: 01/08/24 0805    Lab Status: Final result Specimen: Swab from Nasopharynx Updated: 01/08/24 0915     ADENOVIRUS, PCR Not Detected     Coronavirus 229E Not Detected     Coronavirus HKU1 Not Detected     Coronavirus NL63 Not Detected     Coronavirus OC43 Not Detected     COVID19 Not Detected     Human Metapneumovirus Not Detected     Human Rhinovirus/Enterovirus Not Detected     Influenza A PCR Not Detected     Influenza B PCR Not Detected     Parainfluenza Virus 1 Not Detected     Parainfluenza Virus 2 Not Detected     Parainfluenza Virus 3 Not Detected     Parainfluenza Virus 4 Not Detected     RSV, PCR Not Detected     Bordetella pertussis pcr Not Detected     Bordetella parapertussis PCR Not Detected     Chlamydophila pneumoniae PCR Not Detected     Mycoplasma pneumo by PCR Not Detected    Narrative:      In the setting of a positive respiratory panel with a viral infection PLUS a negative procalcitonin without other underlying concern for bacterial infection, consider observing off antibiotics or discontinuation of antibiotics and continue supportive care. If the respiratory panel is positive for atypical bacterial infection (Bordetella pertussis, Chlamydophila pneumoniae, or Mycoplasma pneumoniae), consider antibiotic de-escalation to target atypical bacterial infection.            Duplex Venous Lower Extremity - Bilateral CAR    Result Date: 1/9/2024    Normal bilateral lower extremity venous duplex scan.     US  Thoracentesis    Result Date: 1/8/2024  DATE OF EXAM: 1/8/2024 4:00 PM EST PROCEDURE: US THORACENTESIS INDICATIONS: Loculated effusion w/ chest pain, IVDU COMPARISON: No Comparisons Available FLUOROSCOPIC TIME: None PHYSICIAN MONITORED CONSCIOUS SEDATION TIME: None minutes TECHNIQUE: A detailed explanation of the procedure, including the risks, benefits, and alternatives was provided. A preprocedure timeout was performed. The interventional radiology nurse monitored the patient at all times. The patient was placed upright in the bed and the right chest was ultrasounded, demonstrating a loculated right effusion. The right chest was then marked and prepped and draped in the usual sterile fashion. The skin and subcutaneous tissues were anesthetized with 1% lidocaine and a small skin incision was made. Next, a 4 Scottish centesis needle was advanced into the collection. A total of 360 mL of slightly cloudy fluid was aspirated and the needle was removed. The patient tolerated the procedure well, without immediate complication. FINDINGS: See above     Impression: 1. Right thoracentesis yielding 360 mL of slightly cloudy fluid. Electronically Signed: Main Mcclure MD  1/8/2024 4:37 PM EST  Workstation ID: [id number]    XR Chest 1 View    Result Date: 1/8/2024  XR CHEST 1 VW Date of Exam: 1/8/2024 4:09 PM EST Indication: s/p thora Comparison: CT chest from January 8, 2024 Findings: There is a small loculated right pleural effusion with right basilar opacity. No pneumothorax is identified. The heart and mediastinal contours appear stable. The pulmonary vascular appears normal. The osseous structures appear intact.     Impression: Impression: 1.Small loculated right pleural effusion. 2.Right basilar opacity, which could reflect atelectasis or pneumonia. 3.No pneumothorax identified. Electronically Signed: Byron Maria MD  1/8/2024 4:32 PM EST  Workstation ID: [id number]    CT Angiogram Chest    Result Date: 1/8/2024  CT  ANGIOGRAM CHEST Date of Exam: 1/8/2024 12:29 PM EST Indication: chest pain, SOA, elevated d-dimer. r/o PE, infection. known IV drug user. Comparison: None available. Technique: CTA of the chest was performed after the uneventful intravenous administration of 85 mL Isovue-370. Reconstructed coronal and sagittal images were also obtained. In addition, a 3-D volume rendered image was created for interpretation. Automated exposure control and iterative reconstruction methods were used. Findings: There is relatively more dense contrast in the SVC and thoracic aorta as compared to the pulmonary arteries and their branches. No large central embolism is identified although peripheral branches are not adequately evaluated on this exam. There are no enlarged mediastinal nodes. There are calcified hilar nodes. There is a small partially loculated right pleural effusion. There is no left pleural effusion. There is compressive atelectasis of portions of the right middle and right lower lobes. There is triangular atelectasis of the left lower lobe.     Impression: Impression: Exam is not adequate for detection of pulmonary embolism. Repeat scanning should be considered if clinically warranted. Small loculated right pleural effusion. Areas of compressive atelectasis of the right middle and right lower lobes, and mild atelectasis of the left lower lobe. Electronically Signed: Reyna Caldwell MD  1/8/2024 12:55 PM EST  Workstation ID: [id number]    XR Chest 1 View    Result Date: 1/8/2024  XR CHEST 1 VW Date of Exam: 1/8/2024 8:26 AM EST Indication: Chest Pain Triage Protocol Comparison: None available. Findings: Lung volumes are diminished and there are perihilar interstitial opacities suggesting pulmonary edema pattern. Small bilateral pleural effusions are evident, greater on the right. There is no distinct pneumothorax. The heart appears mildly enlarged.     Impression: Impression: Hypoventilatory changes and evidence of likely  pulmonary edema with small bilateral pleural effusions. Electronically Signed: Gio Marte MD  1/8/2024 8:47 AM EST  Workstation ID: [id number]         Current medications:  Scheduled Meds:[START ON 1/11/2024] ceFAZolin, 3,000 mg, Intravenous, Once  cefTRIAXone, 2,000 mg, Intravenous, Q24H  methadone, 90 mg, Oral, Daily  metroNIDAZOLE, 500 mg, Oral, Q8H  senna-docusate sodium, 2 tablet, Oral, BID  sodium chloride, 10 mL, Intravenous, Q12H  vancomycin, 1,250 mg, Intravenous, Q12H      Continuous Infusions:Pharmacy to dose vancomycin,       PRN Meds:.  acetaminophen **OR** acetaminophen **OR** acetaminophen    senna-docusate sodium **AND** polyethylene glycol **AND** bisacodyl **AND** bisacodyl    Calcium Replacement - Follow Nurse / BPA Driven Protocol    ketorolac    Magnesium Standard Dose Replacement - Follow Nurse / BPA Driven Protocol    melatonin    Morphine    nicotine    oxyCODONE    Pharmacy to dose vancomycin    Phosphorus Replacement - Follow Nurse / BPA Driven Protocol    Potassium Replacement - Follow Nurse / BPA Driven Protocol    sodium chloride    sodium chloride    Assessment & Plan   Assessment & Plan     Active Hospital Problems    Diagnosis  POA    **Loculated pleural effusion [J90]  Yes    Pleural effusion [J90]  Yes    IVDU (intravenous drug user) [F19.90]  Yes      Resolved Hospital Problems   No resolved problems to display.        Brief Hospital Course to date:  Lion Solis is a 33 y.o. male with h/o obesity and IVDU presents with right sided chest pain    Pleural effusion, possible empyema  - exudate, cultures pending  - empiric antibiotics  - may need decortication, CT surgery follows    Positive blood culture  - possible contaminant, follow    IVDU  - continue methadone  - addiction counseling    Expected Discharge Location and Transportation:   Expected Discharge   Expected Discharge Date: 1/12/2024; Expected Discharge Time:      DVT prophylaxis:  Mechanical DVT prophylaxis orders are  present.     AM-PAC 6 Clicks Score (PT): 18 (01/09/24 2420)    CODE STATUS:   Code Status and Medical Interventions:   Ordered at: 01/08/24 6622     Code Status (Patient has no pulse and is not breathing):    CPR (Attempt to Resuscitate)     Medical Interventions (Patient has pulse or is breathing):    Full Support       Amaury Mchugh MD  01/09/24

## 2024-01-10 NOTE — PROGRESS NOTES
Pharmacy Consult-Vancomycin Dosing  Lion Solis is a  33 y.o. male receiving vancomycin therapy.     Indication: Suspected bacteremia/endocarditis  Consulting Provider: Luis Larson  ID Consult: Yes    Goal AUC: 400 - 600 mg/L*hr    Current Antimicrobial Therapy  Anti-Infectives (From admission, onward)      Ordered     Dose/Rate Route Frequency Start Stop    01/09/24 1310  ceFAZolin in Sodium Chloride (ANCEF) IVPB solution 3,000 mg        Ordering Provider: Gaudencio Justice PA    3,000 mg  200 mL/hr over 30 Minutes Intravenous Once 01/11/24 1030      01/10/24 1026  vancomycin 2250 mg/500 mL 0.9% NS IVPB ([facility])        Ordering Provider: Kirit Felipe RPH    2,250 mg  over 135 Minutes Intravenous Every 12 Hours Scheduled 01/10/24 1115 01/14/24 2059    01/09/24 0848  metroNIDAZOLE (FLAGYL) tablet 500 mg        Ordering Provider: Byron Curiel MD    500 mg Oral Every 8 Hours Scheduled 01/09/24 0900 01/16/24 1359    01/08/24 1504  vancomycin 2750 mg/500 mL 0.9% NS IVPB ([facility])        Ordering Provider: Marito Cross RPH    20 mg/kg × 132 kg  over 165 Minutes Intravenous Once 01/08/24 1800 01/09/24 0135    01/08/24 1401  cefTRIAXone (ROCEPHIN) 2,000 mg in sodium chloride 0.9 % 100 mL IVPB        Ordering Provider: Luis Larson DO    2,000 mg  200 mL/hr over 30 Minutes Intravenous Every 24 Hours 01/08/24 1700 01/15/24 1659    01/08/24 1401  Pharmacy to dose vancomycin        Ordering Provider: Luis Larson DO     Does not apply Continuous PRN 01/08/24 1600 01/15/24 1559            Allergies  Allergies as of 01/08/2024    (No Known Allergies)       Labs    Results from last 7 days   Lab Units 01/10/24  0659 01/09/24  0509 01/08/24  0838   BUN mg/dL 12 13 18   CREATININE mg/dL 0.58* 0.67* 0.82       Results from last 7 days   Lab Units 01/10/24  0659 01/09/24  0509 01/08/24  0814   WBC 10*3/mm3 17.71* 18.66* 17.97*       Evaluation of Dosing     Last Dose Received in the ED/Outside Facility:  "N/A  Is Patient on Dialysis or Renal Replacement: No    Ht - 175 cm (68.9\")  Wt - 132 kg (291 lb 0.1 oz)    Estimated Creatinine Clearance: 243.7 mL/min (A) (by C-G formula based on SCr of 0.58 mg/dL (L)).    Intake & Output (last 3 days)         01/07 0701 01/08 0700 01/08 0701 01/09 0700 01/09 0701  01/10 0700 01/10 0701 01/11 0700    P.O.    240    Total Intake(mL/kg)    240 (1.8)    Urine (mL/kg/hr)   250 (0.1)     Total Output   250     Net   -250 +240                    Microbiology and Radiology  Microbiology Results (last 10 days)       Procedure Component Value - Date/Time    Blood Culture - Blood, Hand, Left [[id number]]  (Normal) Collected: 01/09/24 0525    Lab Status: Preliminary result Specimen: Blood from Hand, Left Updated: 01/10/24 0715     Blood Culture No growth at 24 hours    AFB Culture - Body Fluid, Pleural Cavity [[id number]] Collected: 01/08/24 1618    Lab Status: Preliminary result Specimen: Body Fluid from Pleural Cavity Updated: 01/09/24 1306     AFB Stain No acid fast bacilli seen on concentrated smear    Body Fluid Culture - Body Fluid, Pleural Cavity [[id number]] Collected: 01/08/24 1618    Lab Status: Preliminary result Specimen: Body Fluid from Pleural Cavity Updated: 01/10/24 0819     Body Fluid Culture No growth at 2 days     Gram Stain Few (2+) WBCs seen      Few (2+) Red blood cells      No organisms seen    Fungus Smear - Body Fluid, Pleural Cavity [[id number]] Collected: 01/08/24 1618    Lab Status: Final result Specimen: Body Fluid from Pleural Cavity Updated: 01/09/24 1335     Fungal Stain No fungal elements seen    MRSA Screen, PCR (Inpatient) - Swab, Nares [[id number]]  (Abnormal) Collected: 01/08/24 1450    Lab Status: Final result Specimen: Swab from Nares Updated: 01/08/24 1650     MRSA PCR Positive    Narrative:      The negative predictive value of this diagnostic test is high and should only be used to consider de-escalating anti-MRSA therapy. A positive result may " indicate colonization with MRSA and must be correlated clinically.    Blood Culture - Blood, Arm, Right [[id number]]  (Normal) Collected: 01/08/24 1434    Lab Status: Preliminary result Specimen: Blood from Arm, Right Updated: 01/09/24 1500     Blood Culture No growth at 24 hours    Narrative:      Aerobic Bottle Only    Less than seven (7) mL's of blood was collected.  Insufficient quantity may yield false negative results.    Blood Culture - Blood, Arm, Left [[id number]]  (Abnormal) Collected: 01/08/24 0830    Lab Status: Final result Specimen: Blood from Arm, Left Updated: 01/10/24 0628     Blood Culture Staphylococcus, coagulase negative     Isolated from Anaerobic Bottle     Gram Stain Anaerobic Bottle Gram positive cocci in clusters    Narrative:      Less than seven (7) mL's of blood was collected.  Insufficient quantity may yield false negative results.    Probable contaminant requires clinical correlation, susceptibility not performed unless requested by physician.      Blood Culture ID, PCR - Blood, Arm, Left [[id number]]  (Abnormal) Collected: 01/08/24 0830    Lab Status: Final result Specimen: Blood from Arm, Left Updated: 01/09/24 0545     BCID, PCR Staph spp, not aureus or lugdunensis. Identification by BCID2 PCR.     BOTTLE TYPE Anaerobic Bottle    Blood Culture - Blood, Arm, Left [[id number]]  (Normal) Collected: 01/08/24 0828    Lab Status: Preliminary result Specimen: Blood from Arm, Left Updated: 01/10/24 0845     Blood Culture No growth at 2 days    Narrative:      Less than seven (7) mL's of blood was collected.  Insufficient quantity may yield false negative results.    COVID PRE-OP / PRE-PROCEDURE SCREENING ORDER (NO ISOLATION) - Swab, Nasopharynx [[id number]]  (Normal) Collected: 01/08/24 0805    Lab Status: Final result Specimen: Swab from Nasopharynx Updated: 01/08/24 0915    Narrative:      The following orders were created for panel order COVID PRE-OP / PRE-PROCEDURE SCREENING ORDER (NO  ISOLATION) - Swab, Nasopharynx.  Procedure                               Abnormality         Status                     ---------                               -----------         ------                     Respiratory Panel PCR w/...[[id number]]  Normal              Final result                 Please view results for these tests on the individual orders.    Respiratory Panel PCR w/COVID-19(SARS-CoV-2) VERONICA/MARCELO/ISIDRO/PAD/COR/JOHN In-House, NP Swab in UTM/VTM, 2 HR TAT - Swab, Nasopharynx [[id number]]  (Normal) Collected: 01/08/24 0805    Lab Status: Final result Specimen: Swab from Nasopharynx Updated: 01/08/24 0915     ADENOVIRUS, PCR Not Detected     Coronavirus 229E Not Detected     Coronavirus HKU1 Not Detected     Coronavirus NL63 Not Detected     Coronavirus OC43 Not Detected     COVID19 Not Detected     Human Metapneumovirus Not Detected     Human Rhinovirus/Enterovirus Not Detected     Influenza A PCR Not Detected     Influenza B PCR Not Detected     Parainfluenza Virus 1 Not Detected     Parainfluenza Virus 2 Not Detected     Parainfluenza Virus 3 Not Detected     Parainfluenza Virus 4 Not Detected     RSV, PCR Not Detected     Bordetella pertussis pcr Not Detected     Bordetella parapertussis PCR Not Detected     Chlamydophila pneumoniae PCR Not Detected     Mycoplasma pneumo by PCR Not Detected    Narrative:      In the setting of a positive respiratory panel with a viral infection PLUS a negative procalcitonin without other underlying concern for bacterial infection, consider observing off antibiotics or discontinuation of antibiotics and continue supportive care. If the respiratory panel is positive for atypical bacterial infection (Bordetella pertussis, Chlamydophila pneumoniae, or Mycoplasma pneumoniae), consider antibiotic de-escalation to target atypical bacterial infection.            Reported Vancomycin Levels            Results from last 7 days   Lab Units 01/10/24  0659   VANCOMYCIN TR mcg/mL 4.80*           [facility] AUC Calculation:    Current AUC:   312 mg/L*hr    Predicted Steady State AUC on Current Dose: 306 mg/L*hr  _________________________________    Predicted Steady State AUC on New Dose: 550 mg/L*hr    Assessment/Plan:  1. Pharmacy to dose vancomycin for sepsis, possible empyema. Goal -600 mg/L*hr.  2. Patient on maintenance dose of vancomycin 1250mg (~9.5mg/kg) IV Q12hr. Vancomycin level on 1/10 at 0659 (~10h) was 4.8mg/L.   3. Increase maintenance dose to vancomycin 2250mg (~17mg/kg) IV Q12hr  4. Assess clearance by vancomycin level on 1/11 @ 0600.  5. Pharmacy will continue to monitor renal function, cultures and sensitivities, and clinical status to adjust regimen as necessary.    Kirit Felipe, [place]  Pharmacy Resident  1/10/2024   10:28 EST

## 2024-01-10 NOTE — THERAPY TREATMENT NOTE
Patient Name: Lion Solis  : 1990    MRN: [id number]                              Today's Date: 1/10/2024       Admit Date: 2024    Visit Dx:     ICD-10-CM ICD-9-CM   1. Pleural effusion  J90 511.9   2. Loculated pleural effusion  J90 511.9     Patient Active Problem List   Diagnosis    Loculated pleural effusion    IVDU (intravenous drug user)    Pleural effusion     Past Medical History:   Diagnosis Date    MRSA infection      History reviewed. No pertinent surgical history.   General Information       Row Name 01/10/24 1133          Physical Therapy Time and Intention    Document Type therapy note (daily note)  -AB     Mode of Treatment physical therapy  -AB       Row Name 01/10/24 1133          General Information    Patient Profile Reviewed yes  -AB     Existing Precautions/Restrictions cardiac;fall;oxygen therapy device and L/min  -AB     Barriers to Rehab none identified  -AB       Row Name 01/10/24 1133          Cognition    Orientation Status (Cognition) oriented x 4  -AB       Row Name 01/10/24 1133          Safety Issues, Functional Mobility    Safety Issues Affecting Function (Mobility) awareness of need for assistance;insight into deficits/self-awareness;safety precaution awareness;safety precautions follow-through/compliance  -AB     Impairments Affecting Function (Mobility) balance;coordination;endurance/activity tolerance;postural/trunk control;pain;shortness of breath;strength  -AB               User Key  (r) = Recorded By, (t) = Taken By, (c) = Cosigned By      Initials Name Provider Type    AB Xiao Almonte PT Physical Therapist                   Mobility       Row Name 01/10/24 1133          Bed Mobility    Bed Mobility supine-sit;scooting/bridging  -AB     Scooting/Bridging [place] (Bed Mobility) minimum assist (75% patient effort)  -AB     Supine-Sit [place] (Bed Mobility) minimum assist (75% patient effort)  -AB     Assistive Device (Bed Mobility) bed rails;head of  bed elevated  -AB     Comment, (Bed Mobility) Boost to sit EOB secondary to pain.  -AB       Row Name 01/10/24 1133          Transfers    Comment, (Transfers) Cues for hand placement and sequencing.  -AB       Row Name 01/10/24 1133          Sit-Stand Transfer    Sit-Stand [place] (Transfers) standby assist;verbal cues;1 person assist  -AB       Row Name 01/10/24 1133          Gait/Stairs (Locomotion)    [place] Level (Gait) contact guard;verbal cues;1 person assist  -AB     Distance in Feet (Gait) 60  -AB     Deviations/Abnormal Patterns (Gait) base of support, wide;jeannie decreased;stride length decreased  -AB     Bilateral Gait Deviations forward flexed posture;heel strike decreased  -AB     Comment, (Gait/Stairs) Pt ambulated with step through gait pattern and very slow jeannie secondary to pain. VC's for upright posture. Two standing rest breaks taken. No overt LOB. Further activity limited by pain.  -AB               User Key  (r) = Recorded By, (t) = Taken By, (c) = Cosigned By      Initials Name Provider Type    AB Xiao Almonte, PT Physical Therapist                   Obj/Interventions       Row Name 01/10/24 1134          Motor Skills    Therapeutic Exercise knee;ankle  -AB       Row Name 01/10/24 1134          Knee (Therapeutic Exercise)    Knee (Therapeutic Exercise) strengthening exercise  -AB     Knee Strengthening (Therapeutic Exercise) bilateral;LAQ (long arc quad);10 repetitions  -AB       Row Name 01/10/24 1134          Ankle (Therapeutic Exercise)    Ankle (Therapeutic Exercise) AROM (active range of motion)  -AB     Ankle AROM (Therapeutic Exercise) bilateral;dorsiflexion;plantarflexion;10 repetitions  -AB       Row Name 01/10/24 1134          Balance    Balance Assessment sitting static balance;sitting dynamic balance;standing static balance;standing dynamic balance  -AB     Static Sitting Balance supervision  -AB     Dynamic Sitting Balance supervision  -AB     Position, Sitting  Balance unsupported;sitting edge of bed  -AB     Static Standing Balance contact guard  -AB     Dynamic Standing Balance contact guard;1-person assist;verbal cues  -AB     Position/Device Used, Standing Balance unsupported  -AB     Balance Interventions sitting;standing;sit to stand;supported;static;dynamic;occupation based/functional task  -AB     Comment, Balance No overt LOB.  -AB               User Key  (r) = Recorded By, (t) = Taken By, (c) = Cosigned By      Initials Name Provider Type    AB Xiao Almonte, PT Physical Therapist                   Goals/Plan    No documentation.                  Clinical Impression       Row Name 01/10/24 1135          Pain    Pretreatment Pain Rating 8/10  -AB     Posttreatment Pain Rating 8/10  -AB     Pain Location generalized  -AB     Pain Location - chest  -AB     Pre/Posttreatment Pain Comment tolerated.  -AB     Pain Intervention(s) Ambulation/increased activity;Repositioned  -AB     Additional Documentation Pain Scale: Numbers Pre/Post-Treatment (Group)  -AB       Row Name 01/10/24 1135          Plan of Care Review    Plan of Care Reviewed With patient;spouse  -AB     Progress improving  -AB     Outcome Evaluation Pt increased ambulation distance to 60' with CGA. Pt continues to present below his functional baseline with weakness, acute pain, and SOA. Further IPPT is warrented. PT will progress as able per POC.  -AB       Row Name 01/10/24 1135          Vital Signs    Pre Systolic BP Rehab 130  -AB     Pre Treatment Diastolic BP 86  -AB     Pretreatment Heart Rate (beats/min) 107  -AB     Posttreatment Heart Rate (beats/min) 107  -AB     Pre SpO2 (%) 94  -AB     O2 Delivery Pre Treatment nasal cannula  -AB     O2 Delivery Intra Treatment nasal cannula  -AB     Post SpO2 (%) 90  -AB     O2 Delivery Post Treatment nasal cannula  -AB     Pre Patient Position Supine  -AB     Intra Patient Position Standing  -AB     Post Patient Position Supine  -AB       Row Name 01/10/24  1135          Positioning and Restraints    Pre-Treatment Position in bed  -AB     Post Treatment Position bed  -AB     In Bed notified nsg;supine;call light within reach;encouraged to call for assist;with family/caregiver  -AB               User Key  (r) = Recorded By, (t) = Taken By, (c) = Cosigned By      Initials Name Provider Type    Xiao Martinez, PT Physical Therapist                   Outcome Measures       Row Name 01/10/24 1137          How much help from another person do you currently need...    Turning from your back to your side while in flat bed without using bedrails? 4  -AB     Moving from lying on back to sitting on the side of a flat bed without bedrails? 3  -AB     Moving to and from a bed to a chair (including a wheelchair)? 3  -AB     Standing up from a chair using your arms (e.g., wheelchair, bedside chair)? 3  -AB     Climbing 3-5 steps with a railing? 3  -AB     To walk in hospital room? 3  -AB     AM-PAC 6 Clicks Score (PT) 19  -AB     Highest Level of Mobility Goal 6 --> Walk 10 steps or more  -AB       Row Name 01/10/24 1137          Functional Assessment    Outcome Measure Options AM-PAC 6 Clicks Basic Mobility (PT)  -AB               User Key  (r) = Recorded By, (t) = Taken By, (c) = Cosigned By      Initials Name Provider Type    Xiao Martinez, PT Physical Therapist                                 Physical Therapy Education       Title: PT OT SLP Therapies (Done)       Topic: Physical Therapy (Done)       Point: Mobility training (Done)       Learning Progress Summary             Patient Acceptance, E,D, VU,NR by AB at 1/10/2024 1137    Acceptance, E, NR by KG at 1/9/2024 1432                         Point: Home exercise program (Done)       Learning Progress Summary             Patient Acceptance, E,D, VU,NR by AB at 1/10/2024 1137                         Point: Body mechanics (Done)       Learning Progress Summary             Patient Acceptance, E,D, VU,NR by AB at  1/10/2024 1137    Acceptance, E, NR by KG at 1/9/2024 1432                         Point: Precautions (Done)       Learning Progress Summary             Patient Acceptance, E,D, VU,NR by AB at 1/10/2024 1137    Acceptance, E, NR by KG at 1/9/2024 1432                                         User Key       Initials Effective Dates Name Provider Type Discipline    KG 05/22/20 -  Isabella Partida, PT Physical Therapist PT    AB 09/22/22 -  Xiao Almonte, PT Physical Therapist PT                  PT Recommendation and Plan     Plan of Care Reviewed With: patient, spouse  Progress: improving  Outcome Evaluation: Pt increased ambulation distance to 60' with CGA. Pt continues to present below his functional baseline with weakness, acute pain, and SOA. Further IPPT is warrented. PT will progress as able per POC.     Time Calculation:         PT Charges       Row Name 01/10/24 1137             Time Calculation    Start Time 1106  -AB      PT Received On 01/10/24  -AB         Timed Charges    24254 - Gait Training Minutes  10  -AB      17905 - PT Therapeutic Activity Minutes 13  -AB         Total Minutes    Timed Charges Total Minutes 23  -AB       Total Minutes 23  -AB                User Key  (r) = Recorded By, (t) = Taken By, (c) = Cosigned By      Initials Name Provider Type    AB Xiao Almonte, PT Physical Therapist                  Therapy Charges for Today       Code Description Service Date Service Provider Modifiers Qty    01882090983 HC GAIT TRAINING EA 15 MIN 1/10/2024 Xiao Almonte, PT GP 1    97451755275 HC PT THERAPEUTIC ACT EA 15 MIN 1/10/2024 Xiao Almonte, PT GP 1            PT G-Codes  Outcome Measure Options: AM-PAC 6 Clicks Basic Mobility (PT)  AM-PAC 6 Clicks Score (PT): 19  PT Discharge Summary  Anticipated Discharge Disposition (PT): home with assist, home with outpatient therapy services    Xiao Almonte PT  1/10/2024

## 2024-01-10 NOTE — PLAN OF CARE
Goal Outcome Evaluation:         VSS on 2LNC. PRN meds given for pain. IV antibiotics infused. Tylenol given for temp of 100.9. Will continue plan of care.

## 2024-01-11 ENCOUNTER — ANESTHESIA (OUTPATIENT)
Dept: PERIOP | Facility: HOSPITAL | Age: 34
End: 2024-01-11
Payer: COMMERCIAL

## 2024-01-11 ENCOUNTER — APPOINTMENT (OUTPATIENT)
Dept: GENERAL RADIOLOGY | Facility: HOSPITAL | Age: 34
End: 2024-01-11
Payer: COMMERCIAL

## 2024-01-11 LAB
ANION GAP SERPL CALCULATED.3IONS-SCNC: 10 MMOL/L (ref 5–15)
BACTERIA FLD CULT: NORMAL
BACTERIA SPEC AEROBE CULT: ABNORMAL
BASOPHILS # BLD AUTO: 0.04 10*3/MM3 (ref 0–0.2)
BASOPHILS NFR BLD AUTO: 0.3 % (ref 0–1.5)
BUN SERPL-MCNC: 12 MG/DL (ref 6–20)
BUN/CREAT SERPL: 21.4 (ref 7–25)
CALCIUM SPEC-SCNC: 8.3 MG/DL (ref 8.6–10.5)
CHLORIDE SERPL-SCNC: 103 MMOL/L (ref 98–107)
CO2 SERPL-SCNC: 23 MMOL/L (ref 22–29)
CREAT SERPL-MCNC: 0.56 MG/DL (ref 0.76–1.27)
CRP SERPL-MCNC: 28.31 MG/DL (ref 0–0.5)
DEPRECATED RDW RBC AUTO: 40.4 FL (ref 37–54)
EGFRCR SERPLBLD CKD-EPI 2021: 133.5 ML/MIN/1.73
EOSINOPHIL # BLD AUTO: 0.28 10*3/MM3 (ref 0–0.4)
EOSINOPHIL NFR BLD AUTO: 1.9 % (ref 0.3–6.2)
ERYTHROCYTE [DISTWIDTH] IN BLOOD BY AUTOMATED COUNT: 13 % (ref 12.3–15.4)
GLUCOSE SERPL-MCNC: 85 MG/DL (ref 65–99)
GRAM STN SPEC: ABNORMAL
GRAM STN SPEC: NORMAL
HCT VFR BLD AUTO: 35 % (ref 37.5–51)
HGB BLD-MCNC: 11.7 G/DL (ref 13–17.7)
IMM GRANULOCYTES # BLD AUTO: 0.08 10*3/MM3 (ref 0–0.05)
IMM GRANULOCYTES NFR BLD AUTO: 0.5 % (ref 0–0.5)
ISOLATED FROM: ABNORMAL
LYMPHOCYTES # BLD AUTO: 2.38 10*3/MM3 (ref 0.7–3.1)
LYMPHOCYTES NFR BLD AUTO: 16.3 % (ref 19.6–45.3)
MCH RBC QN AUTO: 28.7 PG (ref 26.6–33)
MCHC RBC AUTO-ENTMCNC: 33.4 G/DL (ref 31.5–35.7)
MCV RBC AUTO: 85.8 FL (ref 79–97)
MONOCYTES # BLD AUTO: 0.95 10*3/MM3 (ref 0.1–0.9)
MONOCYTES NFR BLD AUTO: 6.5 % (ref 5–12)
NEUTROPHILS NFR BLD AUTO: 10.88 10*3/MM3 (ref 1.7–7)
NEUTROPHILS NFR BLD AUTO: 74.5 % (ref 42.7–76)
NRBC BLD AUTO-RTO: 0 /100 WBC (ref 0–0.2)
PLATELET # BLD AUTO: 212 10*3/MM3 (ref 140–450)
PMV BLD AUTO: 9.5 FL (ref 6–12)
POTASSIUM SERPL-SCNC: 3.7 MMOL/L (ref 3.5–5.2)
PROCALCITONIN SERPL-MCNC: 0.37 NG/ML (ref 0–0.25)
RBC # BLD AUTO: 4.08 10*6/MM3 (ref 4.14–5.8)
SODIUM SERPL-SCNC: 136 MMOL/L (ref 136–145)
VANCOMYCIN SERPL-MCNC: 16.9 MCG/ML (ref 5–40)
VANCOMYCIN TROUGH SERPL-MCNC: 4.9 MCG/ML (ref 5–20)
WBC NRBC COR # BLD AUTO: 14.61 10*3/MM3 (ref 3.4–10.8)

## 2024-01-11 PROCEDURE — 87206 SMEAR FLUORESCENT/ACID STAI: CPT | Performed by: THORACIC SURGERY (CARDIOTHORACIC VASCULAR SURGERY)

## 2024-01-11 PROCEDURE — 25010000002 DEXAMETHASONE PER 1 MG: Performed by: ANESTHESIOLOGY

## 2024-01-11 PROCEDURE — 25010000002 HYDROMORPHONE PER 4 MG: Performed by: THORACIC SURGERY (CARDIOTHORACIC VASCULAR SURGERY)

## 2024-01-11 PROCEDURE — 25010000002 ONDANSETRON PER 1 MG: Performed by: ANESTHESIOLOGY

## 2024-01-11 PROCEDURE — 25010000002 MORPHINE PER 10 MG: Performed by: INTERNAL MEDICINE

## 2024-01-11 PROCEDURE — 80048 BASIC METABOLIC PNL TOTAL CA: CPT | Performed by: INTERNAL MEDICINE

## 2024-01-11 PROCEDURE — 0BNF8ZZ RELEASE RIGHT LOWER LUNG LOBE, VIA NATURAL OR ARTIFICIAL OPENING ENDOSCOPIC: ICD-10-PCS | Performed by: THORACIC SURGERY (CARDIOTHORACIC VASCULAR SURGERY)

## 2024-01-11 PROCEDURE — 80202 ASSAY OF VANCOMYCIN: CPT

## 2024-01-11 PROCEDURE — 25010000002 PROPOFOL 10 MG/ML EMULSION: Performed by: ANESTHESIOLOGY

## 2024-01-11 PROCEDURE — 25810000003 SODIUM CHLORIDE 0.9 % SOLUTION: Performed by: PHYSICIAN ASSISTANT

## 2024-01-11 PROCEDURE — 25010000002 SUGAMMADEX 500 MG/5ML SOLUTION: Performed by: ANESTHESIOLOGY

## 2024-01-11 PROCEDURE — 25010000002 FENTANYL CITRATE (PF) 50 MCG/ML SOLUTION

## 2024-01-11 PROCEDURE — 25010000002 CEFAZOLIN PER 500 MG: Performed by: PHYSICIAN ASSISTANT

## 2024-01-11 PROCEDURE — 0BND8ZZ RELEASE RIGHT MIDDLE LUNG LOBE, VIA NATURAL OR ARTIFICIAL OPENING ENDOSCOPIC: ICD-10-PCS | Performed by: THORACIC SURGERY (CARDIOTHORACIC VASCULAR SURGERY)

## 2024-01-11 PROCEDURE — 25010000002 HYDROMORPHONE 1 MG/ML SOLUTION

## 2024-01-11 PROCEDURE — 25010000002 ROPIVACAINE PER 1 MG: Performed by: THORACIC SURGERY (CARDIOTHORACIC VASCULAR SURGERY)

## 2024-01-11 PROCEDURE — 25010000002 GENTAMICIN PER 80 MG: Performed by: THORACIC SURGERY (CARDIOTHORACIC VASCULAR SURGERY)

## 2024-01-11 PROCEDURE — 25010000002 KETOROLAC TROMETHAMINE PER 15 MG: Performed by: THORACIC SURGERY (CARDIOTHORACIC VASCULAR SURGERY)

## 2024-01-11 PROCEDURE — 87075 CULTR BACTERIA EXCEPT BLOOD: CPT | Performed by: THORACIC SURGERY (CARDIOTHORACIC VASCULAR SURGERY)

## 2024-01-11 PROCEDURE — 87070 CULTURE OTHR SPECIMN AEROBIC: CPT | Performed by: THORACIC SURGERY (CARDIOTHORACIC VASCULAR SURGERY)

## 2024-01-11 PROCEDURE — 25010000002 CEFAZOLIN PER 500 MG: Performed by: THORACIC SURGERY (CARDIOTHORACIC VASCULAR SURGERY)

## 2024-01-11 PROCEDURE — 32220 RELEASE OF LUNG: CPT | Performed by: PHYSICIAN ASSISTANT

## 2024-01-11 PROCEDURE — 25010000002 KETOROLAC TROMETHAMINE PER 15 MG: Performed by: ANESTHESIOLOGY

## 2024-01-11 PROCEDURE — 84145 PROCALCITONIN (PCT): CPT | Performed by: INTERNAL MEDICINE

## 2024-01-11 PROCEDURE — 87015 SPECIMEN INFECT AGNT CONCNTJ: CPT | Performed by: THORACIC SURGERY (CARDIOTHORACIC VASCULAR SURGERY)

## 2024-01-11 PROCEDURE — 86140 C-REACTIVE PROTEIN: CPT | Performed by: INTERNAL MEDICINE

## 2024-01-11 PROCEDURE — 99024 POSTOP FOLLOW-UP VISIT: CPT | Performed by: THORACIC SURGERY (CARDIOTHORACIC VASCULAR SURGERY)

## 2024-01-11 PROCEDURE — 25010000002 CEFTRIAXONE PER 250 MG: Performed by: PHYSICIAN ASSISTANT

## 2024-01-11 PROCEDURE — 25810000003 LACTATED RINGERS PER 1000 ML: Performed by: ANESTHESIOLOGY

## 2024-01-11 PROCEDURE — 25010000002 FENTANYL CITRATE (PF) 100 MCG/2ML SOLUTION: Performed by: ANESTHESIOLOGY

## 2024-01-11 PROCEDURE — 32220 RELEASE OF LUNG: CPT | Performed by: THORACIC SURGERY (CARDIOTHORACIC VASCULAR SURGERY)

## 2024-01-11 PROCEDURE — 87205 SMEAR GRAM STAIN: CPT | Performed by: THORACIC SURGERY (CARDIOTHORACIC VASCULAR SURGERY)

## 2024-01-11 PROCEDURE — 25810000003 SODIUM CHLORIDE 0.9 % SOLUTION

## 2024-01-11 PROCEDURE — 85025 COMPLETE CBC W/AUTO DIFF WBC: CPT | Performed by: INTERNAL MEDICINE

## 2024-01-11 PROCEDURE — 25010000002 HYDROMORPHONE PER 4 MG: Performed by: ANESTHESIOLOGY

## 2024-01-11 PROCEDURE — 87116 MYCOBACTERIA CULTURE: CPT | Performed by: THORACIC SURGERY (CARDIOTHORACIC VASCULAR SURGERY)

## 2024-01-11 PROCEDURE — 87176 TISSUE HOMOGENIZATION CULTR: CPT | Performed by: THORACIC SURGERY (CARDIOTHORACIC VASCULAR SURGERY)

## 2024-01-11 PROCEDURE — 71045 X-RAY EXAM CHEST 1 VIEW: CPT

## 2024-01-11 PROCEDURE — 25010000002 VANCOMYCIN 10 G RECONSTITUTED SOLUTION

## 2024-01-11 PROCEDURE — 99232 SBSQ HOSP IP/OBS MODERATE 35: CPT | Performed by: INTERNAL MEDICINE

## 2024-01-11 RX ORDER — FENTANYL CITRATE 50 UG/ML
INJECTION, SOLUTION INTRAMUSCULAR; INTRAVENOUS
Status: COMPLETED
Start: 2024-01-11 | End: 2024-01-11

## 2024-01-11 RX ORDER — METHADONE HYDROCHLORIDE 10 MG/1
90 TABLET ORAL ONCE
Status: COMPLETED | OUTPATIENT
Start: 2024-01-11 | End: 2024-01-11

## 2024-01-11 RX ORDER — SODIUM CHLORIDE 0.9 % (FLUSH) 0.9 %
10 SYRINGE (ML) INJECTION EVERY 12 HOURS SCHEDULED
Status: DISCONTINUED | OUTPATIENT
Start: 2024-01-11 | End: 2024-01-11 | Stop reason: HOSPADM

## 2024-01-11 RX ORDER — LIDOCAINE HYDROCHLORIDE 10 MG/ML
0.5 INJECTION, SOLUTION EPIDURAL; INFILTRATION; INTRACAUDAL; PERINEURAL ONCE AS NEEDED
Status: DISCONTINUED | OUTPATIENT
Start: 2024-01-11 | End: 2024-01-11 | Stop reason: HOSPADM

## 2024-01-11 RX ORDER — ENOXAPARIN SODIUM 100 MG/ML
40 INJECTION SUBCUTANEOUS EVERY 24 HOURS
Status: DISCONTINUED | OUTPATIENT
Start: 2024-01-12 | End: 2024-01-17 | Stop reason: HOSPADM

## 2024-01-11 RX ORDER — POLYETHYLENE GLYCOL 3350 17 G/17G
17 POWDER, FOR SOLUTION ORAL DAILY
Status: DISCONTINUED | OUTPATIENT
Start: 2024-01-11 | End: 2024-01-17 | Stop reason: HOSPADM

## 2024-01-11 RX ORDER — OXYCODONE HYDROCHLORIDE 5 MG/1
5 TABLET ORAL EVERY 4 HOURS PRN
Status: DISCONTINUED | OUTPATIENT
Start: 2024-01-11 | End: 2024-01-12

## 2024-01-11 RX ORDER — FENTANYL CITRATE 50 UG/ML
INJECTION, SOLUTION INTRAMUSCULAR; INTRAVENOUS AS NEEDED
Status: DISCONTINUED | OUTPATIENT
Start: 2024-01-11 | End: 2024-01-11 | Stop reason: SURG

## 2024-01-11 RX ORDER — SODIUM CHLORIDE 9 MG/ML
40 INJECTION, SOLUTION INTRAVENOUS AS NEEDED
Status: DISCONTINUED | OUTPATIENT
Start: 2024-01-11 | End: 2024-01-11 | Stop reason: HOSPADM

## 2024-01-11 RX ORDER — HYDROMORPHONE HYDROCHLORIDE 1 MG/ML
0.5 INJECTION, SOLUTION INTRAMUSCULAR; INTRAVENOUS; SUBCUTANEOUS
Status: DISCONTINUED | OUTPATIENT
Start: 2024-01-11 | End: 2024-01-17 | Stop reason: HOSPADM

## 2024-01-11 RX ORDER — HYDROMORPHONE HYDROCHLORIDE 1 MG/ML
0.5 INJECTION, SOLUTION INTRAMUSCULAR; INTRAVENOUS; SUBCUTANEOUS
Status: DISCONTINUED | OUTPATIENT
Start: 2024-01-11 | End: 2024-01-11 | Stop reason: HOSPADM

## 2024-01-11 RX ORDER — VANCOMYCIN 2 GRAM/500 ML IN 0.9 % SODIUM CHLORIDE INTRAVENOUS
15 ONCE
Status: DISCONTINUED | OUTPATIENT
Start: 2024-01-11 | End: 2024-01-11

## 2024-01-11 RX ORDER — ACETAMINOPHEN 500 MG
1000 TABLET ORAL EVERY 8 HOURS SCHEDULED
Status: DISCONTINUED | OUTPATIENT
Start: 2024-01-11 | End: 2024-01-17 | Stop reason: HOSPADM

## 2024-01-11 RX ORDER — DEXMEDETOMIDINE HYDROCHLORIDE 4 UG/ML
INJECTION, SOLUTION INTRAVENOUS AS NEEDED
Status: DISCONTINUED | OUTPATIENT
Start: 2024-01-11 | End: 2024-01-11 | Stop reason: SURG

## 2024-01-11 RX ORDER — ONDANSETRON 2 MG/ML
INJECTION INTRAMUSCULAR; INTRAVENOUS AS NEEDED
Status: DISCONTINUED | OUTPATIENT
Start: 2024-01-11 | End: 2024-01-11 | Stop reason: SURG

## 2024-01-11 RX ORDER — SODIUM CHLORIDE 9 MG/ML
30 INJECTION, SOLUTION INTRAVENOUS CONTINUOUS
Status: DISCONTINUED | OUTPATIENT
Start: 2024-01-11 | End: 2024-01-12

## 2024-01-11 RX ORDER — ONDANSETRON 2 MG/ML
4 INJECTION INTRAMUSCULAR; INTRAVENOUS EVERY 6 HOURS PRN
Status: DISCONTINUED | OUTPATIENT
Start: 2024-01-11 | End: 2024-01-17 | Stop reason: HOSPADM

## 2024-01-11 RX ORDER — PROPOFOL 10 MG/ML
VIAL (ML) INTRAVENOUS AS NEEDED
Status: DISCONTINUED | OUTPATIENT
Start: 2024-01-11 | End: 2024-01-11 | Stop reason: SURG

## 2024-01-11 RX ORDER — ROPIVACAINE HYDROCHLORIDE 5 MG/ML
INJECTION, SOLUTION EPIDURAL; INFILTRATION; PERINEURAL AS NEEDED
Status: DISCONTINUED | OUTPATIENT
Start: 2024-01-11 | End: 2024-01-11 | Stop reason: HOSPADM

## 2024-01-11 RX ORDER — AMOXICILLIN 250 MG
2 CAPSULE ORAL NIGHTLY
Status: DISCONTINUED | OUTPATIENT
Start: 2024-01-11 | End: 2024-01-11 | Stop reason: SDUPTHER

## 2024-01-11 RX ORDER — SODIUM CHLORIDE 0.9 % (FLUSH) 0.9 %
10 SYRINGE (ML) INJECTION AS NEEDED
Status: DISCONTINUED | OUTPATIENT
Start: 2024-01-11 | End: 2024-01-11 | Stop reason: HOSPADM

## 2024-01-11 RX ORDER — ONDANSETRON 4 MG/1
4 TABLET, ORALLY DISINTEGRATING ORAL EVERY 6 HOURS PRN
Status: DISCONTINUED | OUTPATIENT
Start: 2024-01-11 | End: 2024-01-17 | Stop reason: HOSPADM

## 2024-01-11 RX ORDER — FENTANYL CITRATE 50 UG/ML
50 INJECTION, SOLUTION INTRAMUSCULAR; INTRAVENOUS
Status: DISCONTINUED | OUTPATIENT
Start: 2024-01-11 | End: 2024-01-11 | Stop reason: HOSPADM

## 2024-01-11 RX ORDER — DEXAMETHASONE SODIUM PHOSPHATE 4 MG/ML
INJECTION, SOLUTION INTRA-ARTICULAR; INTRALESIONAL; INTRAMUSCULAR; INTRAVENOUS; SOFT TISSUE AS NEEDED
Status: DISCONTINUED | OUTPATIENT
Start: 2024-01-11 | End: 2024-01-11 | Stop reason: SURG

## 2024-01-11 RX ORDER — VANCOMYCIN/0.9 % SOD CHLORIDE 1.5G/250ML
1500 PLASTIC BAG, INJECTION (ML) INTRAVENOUS EVERY 8 HOURS
Status: DISPENSED | OUTPATIENT
Start: 2024-01-11 | End: 2024-01-14

## 2024-01-11 RX ORDER — KETOROLAC TROMETHAMINE 15 MG/ML
15 INJECTION, SOLUTION INTRAMUSCULAR; INTRAVENOUS EVERY 6 HOURS PRN
Status: DISCONTINUED | OUTPATIENT
Start: 2024-01-11 | End: 2024-01-12

## 2024-01-11 RX ORDER — MIDAZOLAM HYDROCHLORIDE 1 MG/ML
1 INJECTION INTRAMUSCULAR; INTRAVENOUS
Status: DISCONTINUED | OUTPATIENT
Start: 2024-01-11 | End: 2024-01-11 | Stop reason: HOSPADM

## 2024-01-11 RX ORDER — SODIUM CHLORIDE, SODIUM LACTATE, POTASSIUM CHLORIDE, CALCIUM CHLORIDE 600; 310; 30; 20 MG/100ML; MG/100ML; MG/100ML; MG/100ML
9 INJECTION, SOLUTION INTRAVENOUS CONTINUOUS
Status: DISCONTINUED | OUTPATIENT
Start: 2024-01-11 | End: 2024-01-11

## 2024-01-11 RX ORDER — AMOXICILLIN 250 MG
2 CAPSULE ORAL 2 TIMES DAILY
Status: DISCONTINUED | OUTPATIENT
Start: 2024-01-11 | End: 2024-01-17 | Stop reason: HOSPADM

## 2024-01-11 RX ORDER — LIDOCAINE HYDROCHLORIDE 10 MG/ML
INJECTION, SOLUTION EPIDURAL; INFILTRATION; INTRACAUDAL; PERINEURAL AS NEEDED
Status: DISCONTINUED | OUTPATIENT
Start: 2024-01-11 | End: 2024-01-11 | Stop reason: SURG

## 2024-01-11 RX ORDER — SODIUM CHLORIDE, SODIUM LACTATE, POTASSIUM CHLORIDE, CALCIUM CHLORIDE 600; 310; 30; 20 MG/100ML; MG/100ML; MG/100ML; MG/100ML
INJECTION, SOLUTION INTRAVENOUS CONTINUOUS PRN
Status: DISCONTINUED | OUTPATIENT
Start: 2024-01-11 | End: 2024-01-11 | Stop reason: SURG

## 2024-01-11 RX ORDER — ROCURONIUM BROMIDE 10 MG/ML
INJECTION, SOLUTION INTRAVENOUS AS NEEDED
Status: DISCONTINUED | OUTPATIENT
Start: 2024-01-11 | End: 2024-01-11 | Stop reason: SURG

## 2024-01-11 RX ORDER — KETOROLAC TROMETHAMINE 30 MG/ML
INJECTION, SOLUTION INTRAMUSCULAR; INTRAVENOUS AS NEEDED
Status: DISCONTINUED | OUTPATIENT
Start: 2024-01-11 | End: 2024-01-11 | Stop reason: SURG

## 2024-01-11 RX ORDER — NITROGLYCERIN 0.4 MG/1
0.4 TABLET SUBLINGUAL
Status: DISCONTINUED | OUTPATIENT
Start: 2024-01-11 | End: 2024-01-17 | Stop reason: HOSPADM

## 2024-01-11 RX ORDER — GABAPENTIN 100 MG/1
100 CAPSULE ORAL 3 TIMES DAILY
Status: DISCONTINUED | OUTPATIENT
Start: 2024-01-11 | End: 2024-01-17 | Stop reason: HOSPADM

## 2024-01-11 RX ORDER — POLYETHYLENE GLYCOL 3350 17 G/17G
17 POWDER, FOR SOLUTION ORAL DAILY PRN
Status: DISCONTINUED | OUTPATIENT
Start: 2024-01-11 | End: 2024-01-17 | Stop reason: HOSPADM

## 2024-01-11 RX ORDER — BISACODYL 10 MG
10 SUPPOSITORY, RECTAL RECTAL DAILY PRN
Status: DISCONTINUED | OUTPATIENT
Start: 2024-01-11 | End: 2024-01-17 | Stop reason: HOSPADM

## 2024-01-11 RX ORDER — BISACODYL 5 MG/1
5 TABLET, DELAYED RELEASE ORAL DAILY PRN
Status: DISCONTINUED | OUTPATIENT
Start: 2024-01-11 | End: 2024-01-17 | Stop reason: HOSPADM

## 2024-01-11 RX ORDER — FAMOTIDINE 10 MG/ML
20 INJECTION, SOLUTION INTRAVENOUS ONCE
Status: COMPLETED | OUTPATIENT
Start: 2024-01-11 | End: 2024-01-11

## 2024-01-11 RX ORDER — KETAMINE HCL IN NACL, ISO-OSM 100MG/10ML
SYRINGE (ML) INJECTION AS NEEDED
Status: DISCONTINUED | OUTPATIENT
Start: 2024-01-11 | End: 2024-01-11 | Stop reason: SURG

## 2024-01-11 RX ADMIN — OXYCODONE HYDROCHLORIDE 5 MG: 5 TABLET ORAL at 18:14

## 2024-01-11 RX ADMIN — FENTANYL CITRATE 50 MCG: 50 INJECTION, SOLUTION INTRAMUSCULAR; INTRAVENOUS at 14:07

## 2024-01-11 RX ADMIN — ROCURONIUM BROMIDE 20 MG: 10 SOLUTION INTRAVENOUS at 11:34

## 2024-01-11 RX ADMIN — HYDROMORPHONE HYDROCHLORIDE 0.5 MG: 1 INJECTION, SOLUTION INTRAMUSCULAR; INTRAVENOUS; SUBCUTANEOUS at 19:11

## 2024-01-11 RX ADMIN — OXYCODONE HYDROCHLORIDE 5 MG: 5 TABLET ORAL at 02:24

## 2024-01-11 RX ADMIN — MORPHINE SULFATE 4 MG: 4 INJECTION, SOLUTION INTRAMUSCULAR; INTRAVENOUS at 05:44

## 2024-01-11 RX ADMIN — CEFAZOLIN 3000 MG: 10 INJECTION, POWDER, FOR SOLUTION INTRAVENOUS at 11:17

## 2024-01-11 RX ADMIN — METRONIDAZOLE 500 MG: 500 TABLET ORAL at 15:05

## 2024-01-11 RX ADMIN — DEXMEDETOMIDINE HYDROCHLORIDE IN 0.9% SODIUM CHLORIDE 4 MCG: 4 INJECTION INTRAVENOUS at 11:50

## 2024-01-11 RX ADMIN — METRONIDAZOLE 500 MG: 500 TABLET ORAL at 21:20

## 2024-01-11 RX ADMIN — ROCURONIUM BROMIDE 60 MG: 10 SOLUTION INTRAVENOUS at 11:16

## 2024-01-11 RX ADMIN — FENTANYL CITRATE 50 MCG: 50 INJECTION, SOLUTION INTRAMUSCULAR; INTRAVENOUS at 13:23

## 2024-01-11 RX ADMIN — HYDROMORPHONE HYDROCHLORIDE 0.5 MG: 1 INJECTION, SOLUTION INTRAMUSCULAR; INTRAVENOUS; SUBCUTANEOUS at 21:21

## 2024-01-11 RX ADMIN — HYDROMORPHONE HYDROCHLORIDE 0.5 MG: 1 INJECTION, SOLUTION INTRAMUSCULAR; INTRAVENOUS; SUBCUTANEOUS at 17:07

## 2024-01-11 RX ADMIN — CEFTRIAXONE 2000 MG: 2 INJECTION, POWDER, FOR SOLUTION INTRAMUSCULAR; INTRAVENOUS at 18:02

## 2024-01-11 RX ADMIN — SODIUM CHLORIDE 30 ML/HR: 9 INJECTION, SOLUTION INTRAVENOUS at 15:06

## 2024-01-11 RX ADMIN — Medication 20 MG: at 12:56

## 2024-01-11 RX ADMIN — GABAPENTIN 100 MG: 100 CAPSULE ORAL at 15:05

## 2024-01-11 RX ADMIN — Medication 10 MG: at 12:12

## 2024-01-11 RX ADMIN — DEXMEDETOMIDINE HYDROCHLORIDE IN 0.9% SODIUM CHLORIDE 8 MCG: 4 INJECTION INTRAVENOUS at 11:36

## 2024-01-11 RX ADMIN — SUGAMMADEX 300 MG: 100 INJECTION, SOLUTION INTRAVENOUS at 12:53

## 2024-01-11 RX ADMIN — HYDROMORPHONE HYDROCHLORIDE 0.5 MG: 1 INJECTION, SOLUTION INTRAMUSCULAR; INTRAVENOUS; SUBCUTANEOUS at 13:33

## 2024-01-11 RX ADMIN — FENTANYL CITRATE 50 MCG: 50 INJECTION, SOLUTION INTRAMUSCULAR; INTRAVENOUS at 13:41

## 2024-01-11 RX ADMIN — KETOROLAC TROMETHAMINE 15 MG: 15 INJECTION, SOLUTION INTRAMUSCULAR; INTRAVENOUS at 17:07

## 2024-01-11 RX ADMIN — DEXAMETHASONE SODIUM PHOSPHATE 8 MG: 4 INJECTION, SOLUTION INTRAMUSCULAR; INTRAVENOUS at 11:34

## 2024-01-11 RX ADMIN — HYDROMORPHONE HYDROCHLORIDE 0.5 MG: 1 INJECTION, SOLUTION INTRAMUSCULAR; INTRAVENOUS; SUBCUTANEOUS at 13:51

## 2024-01-11 RX ADMIN — ACETAMINOPHEN 650 MG: 325 TABLET ORAL at 05:49

## 2024-01-11 RX ADMIN — KETOROLAC TROMETHAMINE 30 MG: 30 INJECTION, SOLUTION INTRAMUSCULAR; INTRAVENOUS at 11:59

## 2024-01-11 RX ADMIN — FAMOTIDINE 20 MG: 10 INJECTION INTRAVENOUS at 09:54

## 2024-01-11 RX ADMIN — SENNOSIDES AND DOCUSATE SODIUM 2 TABLET: 8.6; 5 TABLET ORAL at 21:20

## 2024-01-11 RX ADMIN — METRONIDAZOLE 500 MG: 500 TABLET ORAL at 05:44

## 2024-01-11 RX ADMIN — LIDOCAINE HYDROCHLORIDE 50 MG: 10 INJECTION, SOLUTION EPIDURAL; INFILTRATION; INTRACAUDAL; PERINEURAL at 11:16

## 2024-01-11 RX ADMIN — DEXMEDETOMIDINE HYDROCHLORIDE IN 0.9% SODIUM CHLORIDE 4 MCG: 4 INJECTION INTRAVENOUS at 11:59

## 2024-01-11 RX ADMIN — GABAPENTIN 100 MG: 100 CAPSULE ORAL at 21:20

## 2024-01-11 RX ADMIN — DEXMEDETOMIDINE HYDROCHLORIDE IN 0.9% SODIUM CHLORIDE 4 MCG: 4 INJECTION INTRAVENOUS at 13:07

## 2024-01-11 RX ADMIN — VANCOMYCIN HYDROCHLORIDE 2250 MG: 10 INJECTION, POWDER, LYOPHILIZED, FOR SOLUTION INTRAVENOUS at 09:29

## 2024-01-11 RX ADMIN — Medication 20 MG: at 11:36

## 2024-01-11 RX ADMIN — METHADONE HYDROCHLORIDE 90 MG: 10 TABLET ORAL at 11:06

## 2024-01-11 RX ADMIN — ACETAMINOPHEN 1000 MG: 500 TABLET, FILM COATED ORAL at 15:04

## 2024-01-11 RX ADMIN — VANCOMYCIN HYDROCHLORIDE 1500 MG: 10 INJECTION, POWDER, LYOPHILIZED, FOR SOLUTION INTRAVENOUS at 15:40

## 2024-01-11 RX ADMIN — HYDROMORPHONE HYDROCHLORIDE 0.5 MG: 1 INJECTION, SOLUTION INTRAMUSCULAR; INTRAVENOUS; SUBCUTANEOUS at 15:05

## 2024-01-11 RX ADMIN — SODIUM CHLORIDE, POTASSIUM CHLORIDE, SODIUM LACTATE AND CALCIUM CHLORIDE 9 ML/HR: 600; 310; 30; 20 INJECTION, SOLUTION INTRAVENOUS at 09:55

## 2024-01-11 RX ADMIN — SODIUM CHLORIDE, POTASSIUM CHLORIDE, SODIUM LACTATE AND CALCIUM CHLORIDE: 600; 310; 30; 20 INJECTION, SOLUTION INTRAVENOUS at 11:10

## 2024-01-11 RX ADMIN — HYDROMORPHONE HYDROCHLORIDE 0.5 MG: 1 INJECTION, SOLUTION INTRAMUSCULAR; INTRAVENOUS; SUBCUTANEOUS at 13:58

## 2024-01-11 RX ADMIN — OXYCODONE HYDROCHLORIDE 5 MG: 5 TABLET ORAL at 08:22

## 2024-01-11 RX ADMIN — FENTANYL CITRATE 100 MCG: 50 INJECTION, SOLUTION INTRAMUSCULAR; INTRAVENOUS at 11:16

## 2024-01-11 RX ADMIN — OXYCODONE HYDROCHLORIDE 5 MG: 5 TABLET ORAL at 22:16

## 2024-01-11 RX ADMIN — PROPOFOL 300 MG: 10 INJECTION, EMULSION INTRAVENOUS at 11:16

## 2024-01-11 RX ADMIN — ACETAMINOPHEN 1000 MG: 500 TABLET, FILM COATED ORAL at 21:20

## 2024-01-11 RX ADMIN — ONDANSETRON 4 MG: 2 INJECTION INTRAMUSCULAR; INTRAVENOUS at 12:01

## 2024-01-11 RX ADMIN — Medication 10 ML: at 21:22

## 2024-01-11 NOTE — ANESTHESIA PROCEDURE NOTES
Airway  Date/Time: 1/11/2024 11:18 AM  Airway not difficult    General Information and Staff    Patient location during procedure: OR  Anesthesiologist: Bernice Hale MD    Indications and Patient Condition  Indications for airway management: airway protection    Preoxygenated: yes  Mask difficulty assessment: 1 - vent by mask    Final Airway Details  Final airway type: endotracheal airway      Successful airway: ETT  Cuffed: yes   Successful intubation technique: video laryngoscopy  Facilitating devices/methods: Bougie and anterior pressure/BURP  Endotracheal tube insertion site: oral  Blade: Chavez  Blade size: 4  ETT size (mm): 8.0  Cormack-Lehane Classification: grade IIa - partial view of glottis  Placement verified by: chest auscultation and capnometry   Cuff volume (mL): 8  Measured from: teeth  ETT/EBT  to teeth (cm): 23  Number of attempts at approach: 2  Assessment: lips, teeth, and gum same as pre-op and atraumatic intubation    Additional Comments  Prior to induction; per Dr. Sam pereira to proceed with SLT for case. Initial intubation atttempt with MAC 4 blade grade 3 view, second attempt with Chavez blade 4 and bougie, +ETCO2, BBSE, ETT secured

## 2024-01-11 NOTE — PROGRESS NOTES
Intensive Care Follow-up     Hospital:  LOS: 2 days   Mr. Lion Solis, 33 y.o. male is followed for:   Loculated pleural effusion            History of present illness:   33 y.o. male with PMH IVDU on chronic methadone, ongoing vaping, and obesity who presented to [facility] ED and subsequently admitted on 1/8/24 for evaluation of right-sided chest pain. Patient reported a recent upper respiratory infection and was seen at an urgent treatment center approximately 5 days prior to admission for his chest pain, which is worse on inhalation, and he was diagnosed with muscle strain and received a steroid injection and naproxen. He was noted to be febrile at that time, with a fever of 101 F.      Upon admission, UDS positive for methadone, fentanyl, and opioids. He had an elevated white count and he was started on vancomycin. HIV panel negative. Blood cultures obtained in the ED positive for Staphylococcus coagulase-negative. Chest x-ray showed bilateral pleural effusions.  CTA chest showed small loculated right pleural effusion and areas of compressive atelectasis of the right middle and right lower lobes and mild atelectasis of the left lower lobe. He underwent thoracentesis on 1/8/24. Pleural fluid protein was elevated at 5.9 and LDH was elevated at 809, consistent with an exudative effusion. Pleural fluid gram stain with no organisms and no growth on preliminary cultures. ID was consulted for antibiotic recommendations on 1/9/24 and he was started on ceftriaxone and flagyl. Dr. Vargas with CTS was consulted on 1/9/24 and recommended thoracotomy and decortication.      Patient was taken to operating room on 1/11 by Dr. Vargas and underwent thoracotomy and decortication.  Procedure was uneventful.  Postprocedure patient is admitted to intensive care unit.        Split share visit.  APRN Time: I spent 5 minutes.     Miryam العلي, MSN, APRN, ACNPC-AG  Pulmonary and Critical Care Medicine  Electronically signed by Sharon RABAGO  "FARRAH العلي, 01/11/24, 7:56 AM EST.           The patient's past medical, surgical and social history were reviewed and updated in Epic as appropriate.       Objective     Infusions:  lactated ringers, 9 mL/hr, Last Rate: 9 mL/hr (01/11/24 0955)  niCARdipine, 5-15 mg/hr, Last Rate: Stopped (01/11/24 1500)  Pharmacy to dose vancomycin,   sodium chloride, 30 mL/hr      Medications:  acetaminophen, 1,000 mg, Oral, Q8H  cefTRIAXone, 2,000 mg, Intravenous, Q24H  gabapentin, 100 mg, Oral, TID  HYDROmorphone, , ,   metroNIDAZOLE, 500 mg, Oral, Q8H  polyethylene glycol, 17 g, Oral, Daily  senna-docusate sodium, 2 tablet, Oral, Nightly  sodium chloride, 10 mL, Intravenous, Q12H  vancomycin, 1,500 mg, Intravenous, Q8H        Vital Sign Min/Max for last 24 hours  Temp  Min: 97.5 °F (36.4 °C)  Max: 102.5 °F (39.2 °C)   BP  Min: 102/78  Max: 138/85   Pulse  Min: 75  Max: 104   Resp  Min: 18  Max: 22   SpO2  Min: 90 %  Max: 98 %   Flow (L/min)  Min: 2  Max: 4       Input/Output for last 24 hour shift  01/10 0701 - 01/11 0700  In: 720 [P.O.:720]  Out: 1550 [Urine:1550]   S RR:  [10-12] 12  Objective:  Vital signs: (most recent): Blood pressure 102/78, pulse 94, temperature 98.3 °F (36.8 °C), temperature source Oral, resp. rate 20, height 175 cm (68.9\"), weight 132 kg (291 lb 0.1 oz), SpO2 91%.            General Appearance: lethargic, wakes up and in no acute distress  Head:    Atraumatic, Normocephalic, without obvious abnormality, Pupils reactive & symmetrical B/L.   Lungs:   B/L Breath sounds present with decreased breath sounds on bases, no wheezing heard, no crackles. Chest tube in place and draining dark serosanguineous fluid, no air leak  Heart: S1 and S2 present, no murmur  Abdomen: Soft, nontender, no guarding or rigidity, bowel sounds positive.  Extremities:   no edema, warm to touch.  Pulses: Positive and symmetric.  Neurologic:  Moving all four extremities. No focal deficit.     Results from last 7 days   Lab Units " 01/11/24  0213 01/10/24  0659 01/09/24  0509   WBC 10*3/mm3 14.61* 17.71* 18.66*   HEMOGLOBIN g/dL 11.7* 11.8* 13.1   PLATELETS 10*3/mm3 212 218 232     Results from last 7 days   Lab Units 01/11/24  0213 01/10/24  0659 01/09/24  0509   SODIUM mmol/L 136 136 131*   POTASSIUM mmol/L 3.7 3.7 4.2   CO2 mmol/L 23.0 25.0 23.0   BUN mg/dL 12 12 13   CREATININE mg/dL 0.56* 0.58* 0.67*   GLUCOSE mg/dL 85 101* 114*     Estimated Creatinine Clearance: 252.4 mL/min (A) (by C-G formula based on SCr of 0.56 mg/dL (L)).          Images:   Chest x-ray reviewed and showed left lung is clear.  Right lung with basilar airspace disease as well as pleural effusion.  Chest tube in place.  Probably loculated pneumothorax at right base.    I reviewed the patient's results and images.     Assessment & Plan   Impression        Loculated pleural effusion    IVDU (intravenous drug user)    Pleural effusion       Plan        33-year-old male with past medical history of IV drug use on chronic methadone, ongoing vaping and obesity.  Patient presented with complaints of worsening right-sided chest pain and shortness of breath.  Patient recently had upper respiratory infection and was seen at [facility] 5 days PTA and he was having chest pain at that time which was thought to be muscle strain and he was given a steroid injection and naproxen.  He was febrile at that time.  Upon admission to ER.  He had elevated white blood cell count.  CTA chest was done which showed loculated right pleural effusion with significant compressive atelectasis of right upper middle and lower lobe.  Right lower lobe significant atelectasis.  Patient underwent thoracentesis and results showed exudative neutrophilic fluid.  Gram stain was negative.  Blood culture grew 1 out of 2 staph coag negative which was thought to be a contaminant.  HIV test was negative.  Patient was seen by infectious disease as well as CT surgery and was recommended to undergo decortication.  Patient  was taken to operating room on 1/11 by Dr. Vargas and underwent right chest decortication and pleural fluid drainage.  Multiple samples have been sent for cultures.  Postprocedure patient is admitted to intensive care unit.    1.  Patient s/p decortication for complicated pleural effusion.  Probable complicated parapneumonic effusion.  Cultures thus far are negative.  Repeat cultures have been sent and will follow closely.  Monitor chest tube output.  2.  Postop pain control.  3.  CT surgery following postop course closely.  4.  Antibiotics per ID team.  Remains on vancomycin, Rocephin and Flagyl.  5.  Daily chest x-ray  6.  Incentive spirometry.  7.  Diet as tolerated.  8.  Monitor urine output and electrolytes closely and replace electrolytes per ICU protocol.  9.  DVT prophylaxis with Lovenox from tomorrow.      Continue close ICU monitoring.    Plan of care and goals reviewed with multidisciplinary/antibiotic stewardship team during rounds.   I discussed the patient's findings and my recommendations with patient and nursing staff       Paul Plummer MD, [facility]  Pulmonary, Critical care and Sleep Medicine

## 2024-01-11 NOTE — ANESTHESIA PREPROCEDURE EVALUATION
Anesthesia Evaluation                  Airway   Mallampati: I  TM distance: >3 FB  Neck ROM: full  No difficulty expected  Dental      Pulmonary    (+) pleural effusion,  Cardiovascular     ECG reviewed        Neuro/Psych  GI/Hepatic/Renal/Endo    (+) morbid obesity    Musculoskeletal     Abdominal    Substance History      OB/GYN          Other                    Anesthesia Plan    ASA 3     general     (epidural)  intravenous induction     Anesthetic plan, risks, benefits, and alternatives have been provided, discussed and informed consent has been obtained with: patient.    Plan discussed with CRNA.    CODE STATUS:    Code Status (Patient has no pulse and is not breathing): CPR (Attempt to Resuscitate)  Medical Interventions (Patient has pulse or is breathing): Full Support

## 2024-01-11 NOTE — PLAN OF CARE
Goal Outcome Evaluation:           Progress: improving  Outcome Evaluation: VSS on 2L NC. PRNs given for pain. NPO. Antibiotics infusing. No other concerns at this time. Will continue with the plan of care.

## 2024-01-11 NOTE — PROGRESS NOTES
CTS Progress Note       LOS: 2 days   Patient Care Team:  Provider, No Known as PCP - General    Chief Complaint: Loculated pleural effusion    Vital Signs:  Temp:  [98.1 °F (36.7 °C)-102.5 °F (39.2 °C)] 100.1 °F (37.8 °C)  Heart Rate:  [] 96  Resp:  [18-22] 18  BP: (117-139)/(80-86) 117/83    Physical Exam:  No worsening respiratory distress     Results:     Results from last 7 days   Lab Units 01/11/24  0213   WBC 10*3/mm3 14.61*   HEMOGLOBIN g/dL 11.7*   HEMATOCRIT % 35.0*   PLATELETS 10*3/mm3 212     Results from last 7 days   Lab Units 01/11/24  0213   SODIUM mmol/L 136   POTASSIUM mmol/L 3.7   CHLORIDE mmol/L 103   CO2 mmol/L 23.0   BUN mg/dL 12   CREATININE mg/dL 0.56*   GLUCOSE mg/dL 85   CALCIUM mg/dL 8.3*           Imaging Results (Last 24 Hours)       ** No results found for the last 24 hours. **            Assessment      Loculated pleural effusion    IVDU (intravenous drug user)    Pleural effusion    Plan for decortication today.  This was discussed in detail with the patient yesterday and he is agreeable to proceed    Plan   As above    Please note that portions of this note were completed with a voice recognition program. Efforts were made to edit the dictations, but occasionally words are mistranscribed.    Douglas Vargas MD  01/11/24  06:38 EST

## 2024-01-11 NOTE — PLAN OF CARE
Goal Outcome Evaluation:  Plan of Care Reviewed With: patient        Progress: improving  Outcome Evaluation: Patient to be NPO after midnight for procedure. Pt on methadone. Morphine and tramadol. Tremor is normal per patient but gets worse as pain increases. ABX given. VSS

## 2024-01-11 NOTE — PLAN OF CARE
Goal Outcome Evaluation:   Alert and oriented. VSS. Afebrile. Sleeping in between care. Diaphoretic r/t pain per pt. Pain meds given as often as possible. Wife at bedside.

## 2024-01-11 NOTE — ANESTHESIA POSTPROCEDURE EVALUATION
Patient: Lion Solis    Procedure Summary       Date: 01/11/24 Room / Location:  [facility] /  [facility]    Anesthesia Start: 1110 Anesthesia Stop: 1308    Procedure: THORACOTOMY WITH DECORTICATION (Right: Chest) Diagnosis:       Pleural effusion      Loculated pleural effusion      (Pleural effusion [J90])      (Loculated pleural effusion [J90])    Surgeons: Douglas Vargas MD Provider: Bernice Hale MD    Anesthesia Type: general ASA Status: 3            Anesthesia Type: general    Vitals  Vitals Value Taken Time   /84 01/11/24 1305   Temp     Pulse 104 01/11/24 1308   Resp     SpO2 95 % 01/11/24 1308   Vitals shown include unfiled device data.        Post Anesthesia Care and Evaluation    Patient location during evaluation: PACU  Patient participation: complete - patient participated  Level of consciousness: awake and alert  Pain score: 5  Pain management: adequate    Airway patency: patent  Anesthetic complications: No anesthetic complications  PONV Status: none  Cardiovascular status: hemodynamically stable and acceptable  Respiratory status: nonlabored ventilation, acceptable and face mask  Hydration status: acceptable

## 2024-01-11 NOTE — PROGRESS NOTES
Pharmacy Consult-Vancomycin Dosing  Lion Solis is a  33 y.o. male receiving vancomycin therapy.     Indication: Suspected bacteremia/endocarditis  Consulting Provider: Luis Larson  ID Consult: Yes    Goal AUC: 400 - 600 mg/L*hr    Current Antimicrobial Therapy  Anti-Infectives (From admission, onward)      Ordered     Dose/Rate Route Frequency Start Stop    01/11/24 1400  vancomycin IVPB 1500 mg in 0.9% NaCl (Premix) 500 mL        Ordering Provider: Kirit Felipe RPH    1,500 mg  333.3 mL/hr over 90 Minutes Intravenous Every 8 Hours 01/11/24 1600 01/13/24 1559    01/09/24 1310  ceFAZolin in Sodium Chloride (ANCEF) IVPB solution 3,000 mg        Ordering Provider: Gaudencio Justice PA    3,000 mg  200 mL/hr over 30 Minutes Intravenous Once 01/11/24 1030 01/11/24 1117    01/09/24 0848  metroNIDAZOLE (FLAGYL) tablet 500 mg        Ordering Provider: Byron Curiel MD    500 mg Oral Every 8 Hours Scheduled 01/09/24 0900 01/16/24 1359    01/08/24 1504  vancomycin 2750 mg/500 mL 0.9% NS IVPB ([facility])        Ordering Provider: Marito Cross RPH    20 mg/kg × 132 kg  over 165 Minutes Intravenous Once 01/08/24 1800 01/09/24 0135    01/08/24 1401  cefTRIAXone (ROCEPHIN) 2,000 mg in sodium chloride 0.9 % 100 mL IVPB        Ordering Provider: Luis Larson DO    2,000 mg  200 mL/hr over 30 Minutes Intravenous Every 24 Hours 01/08/24 1700 01/15/24 1659    01/08/24 1401  Pharmacy to dose vancomycin        Ordering Provider: Luis Larson DO     Does not apply Continuous PRN 01/08/24 1600 01/15/24 1559            Allergies  Allergies as of 01/08/2024    (No Known Allergies)       Labs    Results from last 7 days   Lab Units 01/11/24  0213 01/10/24  0659 01/09/24  0509   BUN mg/dL 12 12 13   CREATININE mg/dL 0.56* 0.58* 0.67*       Results from last 7 days   Lab Units 01/11/24  0213 01/10/24  0659 01/09/24  0509   WBC 10*3/mm3 14.61* 17.71* 18.66*       Evaluation of Dosing     Last Dose Received in the  "ED/Outside Facility: N/A  Is Patient on Dialysis or Renal Replacement: No    Ht - 175 cm (68.9\")  Wt - 132 kg (291 lb 0.1 oz)    Estimated Creatinine Clearance: 252.4 mL/min (A) (by C-G formula based on SCr of 0.56 mg/dL (L)).    Intake & Output (last 3 days)         01/08 0701 01/09 0700 01/09 0701  01/10 0700 01/10 0701  01/11 0700 01/11 0701  01/12 0700    P.O.   720     I.V. (mL/kg)    400 (3)    Total Intake(mL/kg)   720 (5.5) 400 (3)    Urine (mL/kg/hr)  250 (0.1) 1550 (0.5)     Blood    350    Chest Tube    170    Total Output  250 1550 520    Net  -250 -830 -120                    Microbiology and Radiology  Microbiology Results (last 10 days)       Procedure Component Value - Date/Time    Blood Culture - Blood, Hand, Left [[id number]]  (Normal) Collected: 01/09/24 0525    Lab Status: Preliminary result Specimen: Blood from Hand, Left Updated: 01/11/24 0715     Blood Culture No growth at 2 days    AFB Culture - Body Fluid, Pleural Cavity [[id number]] Collected: 01/08/24 1618    Lab Status: Preliminary result Specimen: Body Fluid from Pleural Cavity Updated: 01/09/24 1306     AFB Stain No acid fast bacilli seen on concentrated smear    Body Fluid Culture - Body Fluid, Pleural Cavity [[id number]] Collected: 01/08/24 1618    Lab Status: Final result Specimen: Body Fluid from Pleural Cavity Updated: 01/11/24 0817     Body Fluid Culture No growth at 3 days     Gram Stain Few (2+) WBCs seen      Few (2+) Red blood cells      No organisms seen    Anaerobic Culture - Pleural Fluid, Pleural Cavity [[id number]]  (Normal) Collected: 01/08/24 1618    Lab Status: Preliminary result Specimen: Pleural Fluid from Pleural Cavity Updated: 01/11/24 0719     Anaerobic Culture No anaerobes isolated at 3 days    Fungus Smear - Body Fluid, Pleural Cavity [[id number]] Collected: 01/08/24 1618    Lab Status: Final result Specimen: Body Fluid from Pleural Cavity Updated: 01/09/24 1335     Fungal Stain No fungal elements seen    MRSA " Screen, PCR (Inpatient) - Swab, Nares [[id number]]  (Abnormal) Collected: 01/08/24 1450    Lab Status: Final result Specimen: Swab from Nares Updated: 01/08/24 1650     MRSA PCR Positive    Narrative:      The negative predictive value of this diagnostic test is high and should only be used to consider de-escalating anti-MRSA therapy. A positive result may indicate colonization with MRSA and must be correlated clinically.    Blood Culture - Blood, Arm, Right [[id number]]  (Normal) Collected: 01/08/24 1434    Lab Status: Preliminary result Specimen: Blood from Arm, Right Updated: 01/10/24 1500     Blood Culture No growth at 2 days    Narrative:      Aerobic Bottle Only    Less than seven (7) mL's of blood was collected.  Insufficient quantity may yield false negative results.    Blood Culture - Blood, Arm, Left [[id number]]  (Abnormal)  (Susceptibility) Collected: 01/08/24 0830    Lab Status: Edited Result - FINAL Specimen: Blood from Arm, Left Updated: 01/11/24 0921     Blood Culture Staphylococcus epidermidis     Isolated from Anaerobic Bottle     Gram Stain Anaerobic Bottle Gram positive cocci in clusters    Narrative:      Dr Curiel called and requested further workup    Less than seven (7) mL's of blood was collected.  Insufficient quantity may yield false negative results.    Susceptibility        Staphylococcus epidermidis      JUANJO      Oxacillin <=0.25 ug/ml Susceptible      Vancomycin 2 ug/ml Susceptible                           Blood Culture ID, PCR - Blood, Arm, Left [[id number]]  (Abnormal) Collected: 01/08/24 0830    Lab Status: Final result Specimen: Blood from Arm, Left Updated: 01/09/24 0545     BCID, PCR Staph spp, not aureus or lugdunensis. Identification by BCID2 PCR.     BOTTLE TYPE Anaerobic Bottle    Blood Culture - Blood, Arm, Left [[id number]]  (Normal) Collected: 01/08/24 0828    Lab Status: Preliminary result Specimen: Blood from Arm, Left Updated: 01/11/24 0846     Blood Culture No growth at  3 days    Narrative:      Less than seven (7) mL's of blood was collected.  Insufficient quantity may yield false negative results.    COVID PRE-OP / PRE-PROCEDURE SCREENING ORDER (NO ISOLATION) - Swab, Nasopharynx [[id number]]  (Normal) Collected: 01/08/24 0805    Lab Status: Final result Specimen: Swab from Nasopharynx Updated: 01/08/24 0915    Narrative:      The following orders were created for panel order COVID PRE-OP / PRE-PROCEDURE SCREENING ORDER (NO ISOLATION) - Swab, Nasopharynx.  Procedure                               Abnormality         Status                     ---------                               -----------         ------                     Respiratory Panel PCR w/...[[id number]]  Normal              Final result                 Please view results for these tests on the individual orders.    Respiratory Panel PCR w/COVID-19(SARS-CoV-2) VERONICA/MARCELO/ISIDRO/PAD/COR/JOHN In-House, NP Swab in UTM/VTM, 2 HR TAT - Swab, Nasopharynx [[id number]]  (Normal) Collected: 01/08/24 0805    Lab Status: Final result Specimen: Swab from Nasopharynx Updated: 01/08/24 0915     ADENOVIRUS, PCR Not Detected     Coronavirus 229E Not Detected     Coronavirus HKU1 Not Detected     Coronavirus NL63 Not Detected     Coronavirus OC43 Not Detected     COVID19 Not Detected     Human Metapneumovirus Not Detected     Human Rhinovirus/Enterovirus Not Detected     Influenza A PCR Not Detected     Influenza B PCR Not Detected     Parainfluenza Virus 1 Not Detected     Parainfluenza Virus 2 Not Detected     Parainfluenza Virus 3 Not Detected     Parainfluenza Virus 4 Not Detected     RSV, PCR Not Detected     Bordetella pertussis pcr Not Detected     Bordetella parapertussis PCR Not Detected     Chlamydophila pneumoniae PCR Not Detected     Mycoplasma pneumo by PCR Not Detected    Narrative:      In the setting of a positive respiratory panel with a viral infection PLUS a negative procalcitonin without other underlying concern for  bacterial infection, consider observing off antibiotics or discontinuation of antibiotics and continue supportive care. If the respiratory panel is positive for atypical bacterial infection (Bordetella pertussis, Chlamydophila pneumoniae, or Mycoplasma pneumoniae), consider antibiotic de-escalation to target atypical bacterial infection.            Reported Vancomycin Levels    Results from last 7 days   Lab Units 01/11/24  0213   VANCOMYCIN RM mcg/mL 16.90         Results from last 7 days   Lab Units 01/11/24  0832 01/10/24  0659   VANCOMYCIN TR mcg/mL 4.90* 4.80*          [employer] AUC Calculation:    Current AUC:   471 mg/L*hr    Predicted Steady State AUC on Current Dose: 488 mg/L*hr  _________________________________    Predicted Steady State AUC on New Dose: 489 mg/L*hr    Assessment/Plan:  1. Pharmacy to dose vancomycin for sepsis, possible empyema. Goal -600 mg/L*hr.  2. Patient on maintenance dose to vancomycin 2250mg (~17mg/kg) IV Q12hr.   3. Vancomycin two-level kinetics on 1/11 with a trough (~12h) of 4.9mg/L.  4. Change maintenance dose to vancomycin 1500mg (~11mg/kg) IV Q8hr.  5. Assess clearance by vancomycin level on 1/13 @ 0600.  6. Pharmacy will continue to monitor renal function, cultures and sensitivities, and clinical status to adjust regimen as necessary.    Kirit Felipe, PharmD  Pharmacy Resident  1/11/2024   14:32 EST

## 2024-01-12 ENCOUNTER — APPOINTMENT (OUTPATIENT)
Dept: GENERAL RADIOLOGY | Facility: HOSPITAL | Age: 34
End: 2024-01-12
Payer: COMMERCIAL

## 2024-01-12 LAB
ANION GAP SERPL CALCULATED.3IONS-SCNC: 8 MMOL/L (ref 5–15)
BASOPHILS # BLD AUTO: 0.03 10*3/MM3 (ref 0–0.2)
BASOPHILS NFR BLD AUTO: 0.2 % (ref 0–1.5)
BUN SERPL-MCNC: 13 MG/DL (ref 6–20)
BUN/CREAT SERPL: 28.3 (ref 7–25)
CALCIUM SPEC-SCNC: 8.6 MG/DL (ref 8.6–10.5)
CHLORIDE SERPL-SCNC: 106 MMOL/L (ref 98–107)
CO2 SERPL-SCNC: 25 MMOL/L (ref 22–29)
CREAT SERPL-MCNC: 0.46 MG/DL (ref 0.76–1.27)
DEPRECATED RDW RBC AUTO: 39.6 FL (ref 37–54)
EGFRCR SERPLBLD CKD-EPI 2021: 141.6 ML/MIN/1.73
EOSINOPHIL # BLD AUTO: 0 10*3/MM3 (ref 0–0.4)
EOSINOPHIL NFR BLD AUTO: 0 % (ref 0.3–6.2)
ERYTHROCYTE [DISTWIDTH] IN BLOOD BY AUTOMATED COUNT: 13.1 % (ref 12.3–15.4)
GLUCOSE SERPL-MCNC: 145 MG/DL (ref 65–99)
HCT VFR BLD AUTO: 35.4 % (ref 37.5–51)
HGB BLD-MCNC: 11.7 G/DL (ref 13–17.7)
IMM GRANULOCYTES # BLD AUTO: 0.14 10*3/MM3 (ref 0–0.05)
IMM GRANULOCYTES NFR BLD AUTO: 0.8 % (ref 0–0.5)
LYMPHOCYTES # BLD AUTO: 1.38 10*3/MM3 (ref 0.7–3.1)
LYMPHOCYTES NFR BLD AUTO: 8.3 % (ref 19.6–45.3)
MCH RBC QN AUTO: 27.7 PG (ref 26.6–33)
MCHC RBC AUTO-ENTMCNC: 33.1 G/DL (ref 31.5–35.7)
MCV RBC AUTO: 83.7 FL (ref 79–97)
MONOCYTES # BLD AUTO: 0.87 10*3/MM3 (ref 0.1–0.9)
MONOCYTES NFR BLD AUTO: 5.2 % (ref 5–12)
NEUTROPHILS NFR BLD AUTO: 14.16 10*3/MM3 (ref 1.7–7)
NEUTROPHILS NFR BLD AUTO: 85.5 % (ref 42.7–76)
NRBC BLD AUTO-RTO: 0 /100 WBC (ref 0–0.2)
PLATELET # BLD AUTO: 279 10*3/MM3 (ref 140–450)
PMV BLD AUTO: 9.6 FL (ref 6–12)
POTASSIUM SERPL-SCNC: 4 MMOL/L (ref 3.5–5.2)
RBC # BLD AUTO: 4.23 10*6/MM3 (ref 4.14–5.8)
SODIUM SERPL-SCNC: 139 MMOL/L (ref 136–145)
WBC NRBC COR # BLD AUTO: 16.58 10*3/MM3 (ref 3.4–10.8)

## 2024-01-12 PROCEDURE — 99232 SBSQ HOSP IP/OBS MODERATE 35: CPT | Performed by: INTERNAL MEDICINE

## 2024-01-12 PROCEDURE — 25010000002 KETOROLAC TROMETHAMINE PER 15 MG: Performed by: INTERNAL MEDICINE

## 2024-01-12 PROCEDURE — 99024 POSTOP FOLLOW-UP VISIT: CPT | Performed by: THORACIC SURGERY (CARDIOTHORACIC VASCULAR SURGERY)

## 2024-01-12 PROCEDURE — 71045 X-RAY EXAM CHEST 1 VIEW: CPT

## 2024-01-12 PROCEDURE — 25010000002 HYDROMORPHONE PER 4 MG: Performed by: THORACIC SURGERY (CARDIOTHORACIC VASCULAR SURGERY)

## 2024-01-12 PROCEDURE — 85025 COMPLETE CBC W/AUTO DIFF WBC: CPT | Performed by: PHYSICIAN ASSISTANT

## 2024-01-12 PROCEDURE — 80048 BASIC METABOLIC PNL TOTAL CA: CPT | Performed by: PHYSICIAN ASSISTANT

## 2024-01-12 PROCEDURE — 25810000003 SODIUM CHLORIDE 0.9 % SOLUTION: Performed by: PHYSICIAN ASSISTANT

## 2024-01-12 PROCEDURE — 25810000003 SODIUM CHLORIDE 0.9 % SOLUTION

## 2024-01-12 PROCEDURE — 25010000002 VANCOMYCIN 10 G RECONSTITUTED SOLUTION: Performed by: INTERNAL MEDICINE

## 2024-01-12 PROCEDURE — 25810000003 SODIUM CHLORIDE 0.9 % SOLUTION: Performed by: INTERNAL MEDICINE

## 2024-01-12 PROCEDURE — 25010000002 KETOROLAC TROMETHAMINE PER 15 MG: Performed by: THORACIC SURGERY (CARDIOTHORACIC VASCULAR SURGERY)

## 2024-01-12 PROCEDURE — 25010000002 CEFTRIAXONE PER 250 MG: Performed by: PHYSICIAN ASSISTANT

## 2024-01-12 PROCEDURE — 25010000002 VANCOMYCIN 10 G RECONSTITUTED SOLUTION

## 2024-01-12 RX ORDER — KETOROLAC TROMETHAMINE 30 MG/ML
30 INJECTION, SOLUTION INTRAMUSCULAR; INTRAVENOUS EVERY 6 HOURS PRN
Status: COMPLETED | OUTPATIENT
Start: 2024-01-12 | End: 2024-01-12

## 2024-01-12 RX ORDER — OXYCODONE HYDROCHLORIDE 15 MG/1
7.5 TABLET ORAL EVERY 4 HOURS PRN
Status: DISCONTINUED | OUTPATIENT
Start: 2024-01-12 | End: 2024-01-17 | Stop reason: HOSPADM

## 2024-01-12 RX ORDER — KETOROLAC TROMETHAMINE 30 MG/ML
30 INJECTION, SOLUTION INTRAMUSCULAR; INTRAVENOUS EVERY 6 HOURS PRN
Status: COMPLETED | OUTPATIENT
Start: 2024-01-12 | End: 2024-01-13

## 2024-01-12 RX ORDER — PANTOPRAZOLE SODIUM 40 MG/1
40 TABLET, DELAYED RELEASE ORAL
Status: DISCONTINUED | OUTPATIENT
Start: 2024-01-12 | End: 2024-01-17 | Stop reason: HOSPADM

## 2024-01-12 RX ADMIN — CEFTRIAXONE 2000 MG: 2 INJECTION, POWDER, FOR SOLUTION INTRAMUSCULAR; INTRAVENOUS at 16:42

## 2024-01-12 RX ADMIN — GABAPENTIN 100 MG: 100 CAPSULE ORAL at 20:49

## 2024-01-12 RX ADMIN — GABAPENTIN 100 MG: 100 CAPSULE ORAL at 16:42

## 2024-01-12 RX ADMIN — KETOROLAC TROMETHAMINE 15 MG: 15 INJECTION, SOLUTION INTRAMUSCULAR; INTRAVENOUS at 05:51

## 2024-01-12 RX ADMIN — HYDROMORPHONE HYDROCHLORIDE 0.5 MG: 1 INJECTION, SOLUTION INTRAMUSCULAR; INTRAVENOUS; SUBCUTANEOUS at 11:15

## 2024-01-12 RX ADMIN — OXYCODONE HYDROCHLORIDE 5 MG: 5 TABLET ORAL at 02:59

## 2024-01-12 RX ADMIN — ACETAMINOPHEN 1000 MG: 500 TABLET, FILM COATED ORAL at 05:51

## 2024-01-12 RX ADMIN — PANTOPRAZOLE SODIUM 40 MG: 40 TABLET, DELAYED RELEASE ORAL at 12:23

## 2024-01-12 RX ADMIN — KETOROLAC TROMETHAMINE 30 MG: 30 INJECTION, SOLUTION INTRAMUSCULAR at 12:22

## 2024-01-12 RX ADMIN — Medication 10 ML: at 20:49

## 2024-01-12 RX ADMIN — ACETAMINOPHEN 1000 MG: 500 TABLET, FILM COATED ORAL at 14:27

## 2024-01-12 RX ADMIN — Medication 10 ML: at 09:15

## 2024-01-12 RX ADMIN — METRONIDAZOLE 500 MG: 500 TABLET ORAL at 22:06

## 2024-01-12 RX ADMIN — OXYCODONE HYDROCHLORIDE 7.5 MG: 15 TABLET ORAL at 22:06

## 2024-01-12 RX ADMIN — KETOROLAC TROMETHAMINE 15 MG: 15 INJECTION, SOLUTION INTRAMUSCULAR; INTRAVENOUS at 00:10

## 2024-01-12 RX ADMIN — GABAPENTIN 100 MG: 100 CAPSULE ORAL at 09:14

## 2024-01-12 RX ADMIN — HYDROMORPHONE HYDROCHLORIDE 0.5 MG: 1 INJECTION, SOLUTION INTRAMUSCULAR; INTRAVENOUS; SUBCUTANEOUS at 16:42

## 2024-01-12 RX ADMIN — METRONIDAZOLE 500 MG: 500 TABLET ORAL at 05:51

## 2024-01-12 RX ADMIN — ACETAMINOPHEN 1000 MG: 500 TABLET, FILM COATED ORAL at 22:06

## 2024-01-12 RX ADMIN — VANCOMYCIN HYDROCHLORIDE 1500 MG: 10 INJECTION, POWDER, LYOPHILIZED, FOR SOLUTION INTRAVENOUS at 18:39

## 2024-01-12 RX ADMIN — HYDROMORPHONE HYDROCHLORIDE 0.5 MG: 1 INJECTION, SOLUTION INTRAMUSCULAR; INTRAVENOUS; SUBCUTANEOUS at 20:46

## 2024-01-12 RX ADMIN — SENNOSIDES AND DOCUSATE SODIUM 2 TABLET: 8.6; 5 TABLET ORAL at 20:49

## 2024-01-12 RX ADMIN — OXYCODONE HYDROCHLORIDE 7.5 MG: 15 TABLET ORAL at 12:23

## 2024-01-12 RX ADMIN — HYDROMORPHONE HYDROCHLORIDE 0.5 MG: 1 INJECTION, SOLUTION INTRAMUSCULAR; INTRAVENOUS; SUBCUTANEOUS at 06:35

## 2024-01-12 RX ADMIN — HYDROMORPHONE HYDROCHLORIDE 0.5 MG: 1 INJECTION, SOLUTION INTRAMUSCULAR; INTRAVENOUS; SUBCUTANEOUS at 02:23

## 2024-01-12 RX ADMIN — HYDROMORPHONE HYDROCHLORIDE 0.5 MG: 1 INJECTION, SOLUTION INTRAMUSCULAR; INTRAVENOUS; SUBCUTANEOUS at 14:27

## 2024-01-12 RX ADMIN — VANCOMYCIN HYDROCHLORIDE 1500 MG: 10 INJECTION, POWDER, LYOPHILIZED, FOR SOLUTION INTRAVENOUS at 07:35

## 2024-01-12 RX ADMIN — METRONIDAZOLE 500 MG: 500 TABLET ORAL at 14:27

## 2024-01-12 RX ADMIN — SENNOSIDES AND DOCUSATE SODIUM 2 TABLET: 8.6; 5 TABLET ORAL at 09:14

## 2024-01-12 RX ADMIN — HYDROMORPHONE HYDROCHLORIDE 0.5 MG: 1 INJECTION, SOLUTION INTRAMUSCULAR; INTRAVENOUS; SUBCUTANEOUS at 08:59

## 2024-01-12 RX ADMIN — KETOROLAC TROMETHAMINE 30 MG: 30 INJECTION, SOLUTION INTRAMUSCULAR at 18:19

## 2024-01-12 RX ADMIN — OXYCODONE HYDROCHLORIDE 5 MG: 5 TABLET ORAL at 07:31

## 2024-01-12 RX ADMIN — OXYCODONE HYDROCHLORIDE 7.5 MG: 15 TABLET ORAL at 16:42

## 2024-01-12 RX ADMIN — HYDROMORPHONE HYDROCHLORIDE 0.5 MG: 1 INJECTION, SOLUTION INTRAMUSCULAR; INTRAVENOUS; SUBCUTANEOUS at 18:37

## 2024-01-12 RX ADMIN — VANCOMYCIN HYDROCHLORIDE 1500 MG: 10 INJECTION, POWDER, LYOPHILIZED, FOR SOLUTION INTRAVENOUS at 00:10

## 2024-01-12 RX ADMIN — SODIUM CHLORIDE 30 ML/HR: 9 INJECTION, SOLUTION INTRAVENOUS at 02:00

## 2024-01-12 NOTE — PLAN OF CARE
Goal Outcome Evaluation:  Plan of Care Reviewed With: patient, spouse        Progress: no change  Outcome Evaluation:     -VS stable on 1-2L NC. Diaphoretic at times during the night, but afebrile. Toradol, roxicodone, and dilaudid given as needed for pain rated 4-8/10. 150 ml output from Chest tubes. Good po intake and uop. Patient up to chair this morning.

## 2024-01-12 NOTE — CASE MANAGEMENT/SOCIAL WORK
Continued Stay Note  Psychiatric     Patient Name: Lion Solis  MRN: [id number]  Today's Date: 1/12/2024    Admit Date: 1/8/2024    Plan: Ongoing   Discharge Plan       Row Name 01/12/24 1206       Plan    Plan Ongoing    Patient/Family in Agreement with Plan yes    Plan Comments Per MDR, chest tube remains in place to stay in til Monday Jan.15th. D/C plan ongoing. CM will continue to follow. ID following.    Final Discharge Disposition Code 30 - still a patient                   Discharge Codes    No documentation.                 Expected Discharge Date and Time       Expected Discharge Date Expected Discharge Time    Jan 19, 2024               Vinod Gonzalez RN

## 2024-01-12 NOTE — PROGRESS NOTES
INFECTIOUS DISEASE Progress Note    Lion Solis  1990  [id number]    Date of consult: 1/9/24    Admit date: 1/8/2024    Requesting Provider: Trae Zayas MD   Evaluating physician: Byron Curiel MD  Reason for Consultation:     IVDU, pleural effusion, GPC in blood     Chief Complaint: Chest pain      Subjective   History of present illness:  Lion Solis is a  33 y.o.  Yr old male with obesity, h/o vaping, chronic methadone use, and IV drug use with fentanyl who presented on 1/8 for evaluation of right-sided chest pain. He did have a recent upper respiratory illness resolved about 10 days prior to arrival.  About 5 days prior to arrival he developed right-sided chest pain was evaluated in urgent treatment center and was noted to have a fever up to 101°F.  He was diagnosed with a muscle strain was given a steroid shot and naproxen.  His pain will progressed to the point where he presented to the ER yesterday due to increased pain.  The pain on the right side of his chest is pleuritic/worse with deep inspiration.  He is not having any sputum production.      Since arrival, T-max has been 101°F.  He has been on 2 L nasal cannula supplemental O2 with SPO2 in the low 90s.  He was initially tachycardic up to 126 bpm but this is improved. White blood cell count was initially 17.97 but is trended up slightly to 18.66. MRSA PCR nares is positive. Urine drug screen was positive for methadone, opiates, and fentanyl. Respiratory viral PCR panel was negative. One blood culture set is positive for coagulase-negative staph with final ID in progress. Repeat blood cultures were sent today. HIV fourth-generation screen was negative. Chest x-ray showed bilateral pleural effusions.  CTA chest showed small loculated right pleural effusion and areas of compressive atelectasis of the right middle and right lower lobes and mild atelectasis of the left lower lobe. Right-sided thoracentesis procedure was done yesterday  yielding 360 cc of slightly cloudy fluid. Pleural fluid protein was elevated to 5.9 and LDH was elevated to 809 consistent with an exudate.  Pleural fluid Gram stain showed no organisms and culture is preliminarily no growth to date. A TTE was ordered and pending. The patient has been on IV vancomycin and ceftriaxone by the primary team. ID has been consult for antibiotic recommendations in the setting of the patient's positive blood culture, IV drug abuse, and pleural effusion that is exudative.    Subjective:    1/10/24: Patient is feeling slightly better today. Did have some fevers up to 100.9°F last night but has been afebrile today.  He is on 2 L nasal cannula Supplemental O2.  Still with a cough but no worse.  Still with some right-sided pleuritic chest pain.  CT surgery has evaluated the patient and plans for thoracotomy with decortication tomorrow.    1/11/24: patient underwent thoracotomy and decortication procedure today. Has right-sided chest tube in place. Did have some fevers yesterday evening but improved today. WBC is trending down. No new complaints today.     Past Medical History:   Diagnosis Date    MRSA infection        History reviewed. No pertinent surgical history.    Pediatric History   Patient Parents    Not on file     Other Topics Concern    Not on file   Social History Narrative    Not on file   The patient uses IV drugs.  He claims to use heroin but he notes that fentanyl was positive and his UDS and it was probably fentanyl.  He does Vape. He is currently unemployed.  He is .    family history is not on file.    No Known Allergies      There is no immunization history on file for this patient.    Medication:    Current Facility-Administered Medications:     acetaminophen (TYLENOL) tablet 650 mg, 650 mg, Oral, Q4H PRN, 650 mg at 01/11/24 0549 **OR** acetaminophen (TYLENOL) 160 MG/5ML oral solution 650 mg, 650 mg, Oral, Q4H PRN, 650 mg at 01/09/24 2001 **OR** acetaminophen (TYLENOL)  suppository 650 mg, 650 mg, Rectal, Q4H PRN, Jama Pascual PA    acetaminophen (TYLENOL) tablet 1,000 mg, 1,000 mg, Oral, Q8H, Jama Pascual PA, 1,000 mg at 01/11/24 1504    bisacodyl (DULCOLAX) EC tablet 5 mg, 5 mg, Oral, Daily PRN, Jama Pascual PA    bisacodyl (DULCOLAX) suppository 10 mg, 10 mg, Rectal, Daily PRN, Jama Pascual PA    Calcium Replacement - Follow Nurse / BPA Driven Protocol, , Does not apply, PRN, Jama Pascual PA    cefTRIAXone (ROCEPHIN) 2,000 mg in sodium chloride 0.9 % 100 mL IVPB, 2,000 mg, Intravenous, Q24H, Jama Pascual PA, Last Rate: 200 mL/hr at 01/11/24 1802, 2,000 mg at 01/11/24 1802    [START ON 1/12/2024] Enoxaparin Sodium (LOVENOX) syringe 40 mg, 40 mg, Subcutaneous, Q24H, Paul Plummer MD    gabapentin (NEURONTIN) capsule 100 mg, 100 mg, Oral, TID, Douglas Vargas MD, 100 mg at 01/11/24 1505    HYDROmorphone (DILAUDID) 1 MG/ML injection  - ADS Override Pull, , , ,     HYDROmorphone (DILAUDID) injection 0.5 mg, 0.5 mg, Intravenous, Q2H PRN, Douglas Vargas MD, 0.5 mg at 01/11/24 1911    ketorolac (TORADOL) injection 15 mg, 15 mg, Intravenous, Q6H PRN, Douglas Vargas MD, 15 mg at 01/11/24 1707    Magnesium Standard Dose Replacement - Follow Nurse / BPA Driven Protocol, , Does not apply, PRN, Jama Pascual PA    melatonin tablet 10 mg, 10 mg, Oral, Nightly PRN, Jama Pascual PA, 10 mg at 01/09/24 2227    metroNIDAZOLE (FLAGYL) tablet 500 mg, 500 mg, Oral, Q8H, Jama Pascual PA, 500 mg at 01/11/24 1505    niCARdipine (CARDENE) 25mg in 250mL NS infusion, 5-15 mg/hr, Intravenous, Titrated, Jama Pascual PA, Held at 01/11/24 1500    nicotine (NICODERM CQ) 21 MG/24HR patch 1 patch, 1 patch, Transdermal, Daily PRN, Jama Pascual PA    nitroglycerin (NITROSTAT) SL tablet 0.4 mg, 0.4 mg, Sublingual, Q5 Min PRN, Jama Pascual PA    ondansetron ODT (ZOFRAN-ODT) disintegrating tablet 4 mg, 4 mg, Oral, Q6H PRN **OR** ondansetron (ZOFRAN)  "injection 4 mg, 4 mg, Intravenous, Q6H PRN, Jama Pascual, PA    oxyCODONE (ROXICODONE) immediate release tablet 5 mg, 5 mg, Oral, Q4H PRN, Douglas Vargas MD, 5 mg at 01/11/24 1814    Pharmacy to dose vancomycin, , Does not apply, Continuous PRN, LockJama nicolas C, PA    Phosphorus Replacement - Follow Nurse / BPA Driven Protocol, , Does not apply, PRN, LocklarJama C, PA    polyethylene glycol (MIRALAX) packet 17 g, 17 g, Oral, Daily, Locklar Jama C, PA    polyethylene glycol (MIRALAX) packet 17 g, 17 g, Oral, Daily PRN, Jama Pascual C, PA    Potassium Replacement - Follow Nurse / BPA Driven Protocol, , Does not apply, PRN, LockJama nicolas C, PA    sennosides-docusate (PERICOLACE) 8.6-50 MG per tablet 2 tablet, 2 tablet, Oral, BID, Jama Pascual C, PA    sodium chloride 0.9 % flush 10 mL, 10 mL, Intravenous, Q12H, Locklar Jama C, PA, 10 mL at 01/09/24 0850    sodium chloride 0.9 % flush 10 mL, 10 mL, Intravenous, PRN, Locklar Jama C, PA    sodium chloride 0.9 % infusion 40 mL, 40 mL, Intravenous, PRN, Locklar, Jama C, PA    sodium chloride 0.9 % infusion, 30 mL/hr, Intravenous, Continuous, Jama Pascual C, PA, Last Rate: 30 mL/hr at 01/11/24 1506, 30 mL/hr at 01/11/24 1506    vancomycin IVPB 1500 mg in 0.9% NaCl (Premix) 500 mL, 1,500 mg, Intravenous, Q8H, Kirit Felipe, [facility], Last Rate: 333.3 mL/hr at 01/11/24 1540, 1,500 mg at 01/11/24 1540    Please refer to the medical record for a full medication list    Review of Systems:    As above    Physical Exam:   Vital Signs   Temp:  [97.5 °F (36.4 °C)-100.1 °F (37.8 °C)] 98.4 °F (36.9 °C)  Heart Rate:  [] 84  Resp:  [18-24] 24  BP: (102-137)/(59-88) 129/77    Temp  Min: 97.5 °F (36.4 °C)  Max: 100.1 °F (37.8 °C)  BP  Min: 102/78  Max: 137/87  Pulse  Min: 71  Max: 104  Resp  Min: 18  Max: 24  SpO2  Min: 89 %  Max: 98 %    Blood pressure 129/77, pulse 84, temperature 98.4 °F (36.9 °C), temperature source Axillary, resp. rate 24, height 175 cm (68.9\"), " weight 132 kg (291 lb 0.1 oz), SpO2 92%.  GENERAL: somnolent but wakes up easily. Ill appearing. Morbidly obese.  HEENT:  Normocephalic, atraumatic.  No external oral lesions noted  EYES: PERRL. No conjunctival injection. No icterus.   HEART: RRR, no murmur  LUNGS: Diminished breath sounds in the right lung base. Nonlabored breathing nasal cannula. Right-sided chest tube in place  ABDOMEN: Nondistended  GENITAL: no Talavera catheter  SKIN: no generalized rashes.  No peripheral stigmata of infective endocarditis noted. Does have multiple tattoos over his body including his torso.  PSYCHIATRIC: cooperative.  Appropriate mood and affect  EXT:  No cellulitic change.  NEURO: somnolent but wakes up easily. Oriented x4.  Normal speech and cognition    Results Review:   I reviewed the patient's new clinical results.  I reviewed the patient's new imaging results and agree with the interpretation.  I reviewed the patient's other test results and agree with the interpretation    Results from last 7 days   Lab Units 01/11/24  0213 01/10/24  0659 01/09/24  0509   WBC 10*3/mm3 14.61* 17.71* 18.66*   HEMOGLOBIN g/dL 11.7* 11.8* 13.1   HEMATOCRIT % 35.0* 35.4* 38.7   PLATELETS 10*3/mm3 212 218 232     Results from last 7 days   Lab Units 01/11/24  0213   SODIUM mmol/L 136   POTASSIUM mmol/L 3.7   CHLORIDE mmol/L 103   CO2 mmol/L 23.0   BUN mg/dL 12   CREATININE mg/dL 0.56*   GLUCOSE mg/dL 85   CALCIUM mg/dL 8.3*     Results from last 7 days   Lab Units 01/09/24  0509   ALK PHOS U/L 117   BILIRUBIN mg/dL 1.0   ALT (SGPT) U/L 52*   AST (SGOT) U/L 22         Results from last 7 days   Lab Units 01/11/24  0213   CRP mg/dL 28.31*     Results from last 7 days   Lab Units 01/11/24  0832 01/11/24  0213 01/10/24  0659   VANCOMYCIN TR mcg/mL 4.90*  --  4.80*   VANCOMYCIN RM mcg/mL  --  16.90  --      Results from last 7 days   Lab Units 01/08/24  0828   LACTATE mmol/L 1.2     Estimated Creatinine Clearance: 252.4 mL/min (A) (by C-G formula  "based on SCr of 0.56 mg/dL (L)).     Procalitonin Results:      Lab 01/11/24  0213 01/08/24  0838   PROCALCITONIN 0.37* 0.21      Brief Urine Lab Results       None           No results found for: \"SITE\", \"ALLENTEST\", \"PHART\", \"PIY6BUN\", \"PO2ART\", \"WUB6HDX\", \"BASEEXCESS\", \"M9ATFMJW\", \"HGBBG\", \"HCTABG\", \"OXYHEMOGLOBI\", \"METHHGBN\", \"CARBOXYHGB\", \"CO2CT\", \"BAROMETRIC\", \"MODALITY\", \"FIO2\"     Microbiology:  Blood Culture   Date Value Ref Range Status   01/08/2024 Abnormal Stain (C)  Preliminary     BCID, PCR   Date Value Ref Range Status   01/08/2024 (A) Negative by BCID PCR. Culture to Follow. Final    Staph spp, not aureus or lugdunensis. Identification by BCID2 PCR.       Blood Culture   Date Value Ref Range Status   01/08/2024 Abnormal Stain (C)  Preliminary     Body Fluid Culture   Date Value Ref Range Status   01/08/2024 No growth  Preliminary     BCID, PCR   Date Value Ref Range Status   01/08/2024 (A) Negative by BCID PCR. Culture to Follow. Final    Staph spp, not aureus or lugdunensis. Identification by BCID2 PCR.   (      Radiology:  Imaging Results (Last 72 Hours)       Procedure Component Value Units Date/Time    XR Chest 1 View [[id number]] Collected: 01/11/24 1406     Updated: 01/11/24 1411    Narrative:      XR CHEST 1 VW    Date of Exam: 1/11/2024 12:35 PM CST    Indication: postop    Comparison: Chest x-ray 1/8/2024    Findings:  The heart is prominent yet stable in size. There our right pleural chest tubes with a pneumothorax along the lateral lung base measuring 7 mm. There is right basilar airspace disease and pleural effusion.        Impression:      Impression:    1. Right basilar airspace disease and pleural effusion.    2. Pneumothorax at the lateral right lung base.      Electronically Signed: Crystal Navarro MD    1/11/2024 1:08 PM CST    Workstation ID: [id number]        I read the patient's chest xray from today- ongoing right basilar airspace disease and effusion    IMPRESSION: "     Problems:  Sepsis with fevers, tachycardia, and leukocytosis-Suspect possibly due to an empyema on the right side.  1 blood culture set was positive for staph epidermidis which likely represents a contaminate. TTE was negative for vegetations. Now s/p thoracotomy and decortication procedure on 1/11.  Right-sided Exudative pleural effusion, possible empyema   Staph epidermidis in 1 blood culture set so far-likely a contaminate  IV drug abuse  MRSA nasal colonization    RECOMMENDATIONS:    -Continue to follow CBC and BMP  -Continue to follow blood cultures-staph epidermidis. Likely a contaminate  -TTE was without any vegetations  -Continue ceftriaxone 2 g IV every 24 hours and IV vancomycin with pharmacy dosing assistance for now.  -Continue flagyl 500 mg PO TID for some anaerobic coverage for possible empyema  -Status post thoracotomy and decortication procedure today. I will follow cultures from this procedure    The patient is not a good candidate for outpatient IV antibiotics with a PICC line in the setting of his recent IV drug abuse.  I discussed this with him today.    I discussed with the patient and his wife at bedside today    Thank you for asking me to see Lion Solis.      Complex MDM    Byron Curiel MD  1/11/2024

## 2024-01-12 NOTE — CASE MANAGEMENT/SOCIAL WORK
"Continued Stay Note  [facility]     Patient Name: Lion Solis  MRN: [id number]  Today's Date: 1/12/2024    Admit Date: 1/8/2024    Plan: Ongoing-  see note regarding medicaitons   Discharge Plan       Row Name 01/12/24 0855       Plan    Plan Ongoing-  see note regarding medications    Plan Comments Visited with patient today in the unit; POD 1-  Right thoracotomy with total complete pleural decortication     MOUD;  Pt is an established patient (2 years) at [facility] for Methadone maintenance.  Maintenance dose= 90 mg.    Pain assessment:  Consistently rates his pain 6-8.    Current regimen:  Scheduled-  Tylenol 1000 mg PO Q 8  Gabapentin 100 mg PO TID  PRN-  Dilaudid 0.5 mg IV Q 2 PRN  Toradol 30 mg IV Q 6 (I have encouraged him to take this med, due to the inflammatory nature of his pain)  SANYA IR 7.5 mg PO Q 4    Recommendations:  Please consider:   Adding in Methadone at 1/2 dosing (45 mg PO QD)- once he is off at IV opioids, increase Methadone to his maintenance dose of 90 mg PO QD  He also reports \"spasm and cramping pain in my back on the side of the chest tubes)-  Consider adding Flexeril 10 mg PO TID    I will follow him through his hospital course.          Row Name 01/12/24 1206       Plan    Plan Ongoing    Patient/Family in Agreement with Plan yes    Plan Comments Per MDR, chest tube remains in place to stay in til Monday Jan.15th. D/C plan ongoing. CM will continue to follow. ID following.    Final Discharge Disposition Code 30 - still a patient                   Discharge Codes    No documentation.                 Expected Discharge Date and Time       Expected Discharge Date Expected Discharge Time    Jan 19, 2024               Laura Banks RN MA,BSN-  Addiction Medicine     "

## 2024-01-12 NOTE — OP NOTE
Operative Report    Preop Diagnosis: Right sided empyema with positive blood cultures        Postoperative Diagnosis: Same      Procedure: Right thoracotomy with total complete pleural decortication        Surgeons: Douglas Vargas MD      Assistant: Jama Pascual PA-C    The Assistant provided services of suctioning, irrigation and retraction.  Also, assisted in suture closure of parts of the skin incision.      Indication: This patient has a history of obesity IV drug abuse and recent positive blood cultures he had had a little moderate to large size right pleural effusion which did not respond to chest tube drainage as it was loculated and we were consulted for decortication.  Patient was aware the procedure.  He was also aware of the possible incisions planned and the need for chest tube drainage postoperatively.  No guarantees as to care were made risk of bleeding infection prolonged air leak were discussed he agreed to proceed        Description: Supine position patient intubated and then placed in the right thoracotomy position.  Sterile prep and drape and antibiotics given.  Posterolateral incision was made upon entering the chest there was thick tenacious pleural peel with a foul smell tissue with surrounding fluid were sent together to microbiology for complete bacterial logic studies.  A complete and total decortication of the right upper middle and lower lobes was performed so that we had good lung reexpansion.  We also removed peel from the parietal pleura in addition to the visceral pleura.  Ultimately we had good lung reexpansion and the chest was irrigated copiously with an antibiotic solution and 2 large bore chest tubes were placed.  The ribs were closed with #2 Vicryl the muscle 0 Vicryl and the skin edges with 3-0 Vicryl.  The acute blood loss was less than 200 mL.  Sponge and needle count was reported as correct and there was no apparent early complications and the patient was extubated in the  operating room      Please note that portions of this note were completed with a voice recognition program. Efforts were made to edit the dictations, but occasionally words are mistranscribed.

## 2024-01-12 NOTE — PROGRESS NOTES
Intensive Care Follow-up     Hospital:  LOS: 3 days   Mr. Lion Solis, 33 y.o. male is followed for:   Loculated pleural effusion   Postoperative hemodynamic, electrolyte and respiratory management         History of present illness:   33-year-old male with past medical history of IV drug use on chronic methadone, ongoing vaping and obesity.  Patient presented with complaints of worsening right-sided chest pain and shortness of breath.  Patient recently had upper respiratory infection and was seen at [facility] 5 days PTA and he was having chest pain at that time which was thought to be muscle strain and he was given a steroid injection and naproxen.  He was febrile at that time.  Upon admission to ER.  He had elevated white blood cell count.  CTA chest was done which showed loculated right pleural effusion with significant compressive atelectasis of right upper middle and lower lobe.  Right lower lobe significant atelectasis.  Patient underwent thoracentesis and results showed exudative neutrophilic fluid.  Gram stain was negative.  Blood culture grew 1 out of 2 staph coag negative which was thought to be a contaminant.  HIV test was negative.  Patient was seen by infectious disease as well as CT surgery and was recommended to undergo decortication.  Patient was taken to operating room on 1/11 by Dr. Vargas and underwent right chest decortication and pleural fluid drainage.  Multiple samples have been sent for cultures.  Postprocedure patient is admitted to intensive care unit.    Interval history: Overnight continue to have chest discomfort and pain from the chest tube sites.  Patient has been on high-dose methadone at home.  We will go ahead and increase his oxycodone for now and also increase his Toradol which seems to be helping him.  Monitor renal function closely as well*GI prophylaxis.      The patient's past medical, surgical and social history were reviewed and updated in Epic as appropriate.       Objective  "    Infusions:  niCARdipine, 5-15 mg/hr, Last Rate: Stopped (01/11/24 1500)  Pharmacy to dose vancomycin,       Medications:  acetaminophen, 1,000 mg, Oral, Q8H  cefTRIAXone, 2,000 mg, Intravenous, Q24H  enoxaparin, 40 mg, Subcutaneous, Q24H  gabapentin, 100 mg, Oral, TID  metroNIDAZOLE, 500 mg, Oral, Q8H  pantoprazole, 40 mg, Oral, Q AM  polyethylene glycol, 17 g, Oral, Daily  senna-docusate sodium, 2 tablet, Oral, BID  sodium chloride, 10 mL, Intravenous, Q12H  vancomycin, 1,500 mg, Intravenous, Q8H        Vital Sign Min/Max for last 24 hours  Temp  Min: 97.7 °F (36.5 °C)  Max: 98.7 °F (37.1 °C)   BP  Min: 97/56  Max: 137/85   Pulse  Min: 54  Max: 101   Resp  Min: 12  Max: 26   SpO2  Min: 89 %  Max: 98 %   Flow (L/min)  Min: 1  Max: 4       Input/Output for last 24 hour shift  01/11 0701 - 01/12 0700  In: 2795.6 [P.O.:900; I.V.:934.6]  Out: 2148 [Urine:1325]   S RR:  [12] 12  Objective:  Vital signs: (most recent): Blood pressure 136/75, pulse 92, temperature 98.3 °F (36.8 °C), temperature source Oral, resp. rate 18, height 175 cm (68.9\"), weight 132 kg (291 lb 0.1 oz), SpO2 94%.            General Appearance: Awake, alert in mild distress due to pain   Lungs:   B/L Breath sounds present with decreased breath sounds on bases, no wheezing heard, no crackles. Chest tube in place and draining dark serosanguineous fluid, no air leak  Heart: S1 and S2 present, no murmur  Abdomen: Soft, nontender, no guarding or rigidity, bowel sounds positive.  Extremities:   no edema, warm to touch.  Neurologic:  Moving all four extremities. No focal deficit.     Results from last 7 days   Lab Units 01/12/24  0602 01/11/24  0213 01/10/24  0659   WBC 10*3/mm3 16.58* 14.61* 17.71*   HEMOGLOBIN g/dL 11.7* 11.7* 11.8*   PLATELETS 10*3/mm3 279 212 218     Results from last 7 days   Lab Units 01/12/24  0602 01/11/24  0213 01/10/24  0659   SODIUM mmol/L 139 136 136   POTASSIUM mmol/L 4.0 3.7 3.7   CO2 mmol/L 25.0 23.0 25.0   BUN mg/dL 13 12 " 12   CREATININE mg/dL 0.46* 0.56* 0.58*   GLUCOSE mg/dL 145* 85 101*     Estimated Creatinine Clearance: 307.2 mL/min (A) (by C-G formula based on SCr of 0.46 mg/dL (L)).          Images:   Chest x-ray reviewed and showed clear left lung.  Right hemidiaphragm elevation.  Chest tube in place.  No definite pneumothorax noted.  Still loculated effusion on the right periphery but improved from before.  I reviewed the patient's results and images.     Assessment & Plan   Impression        Loculated pleural effusion    IVDU (intravenous drug user)    Pleural effusion       Plan        1.  Patient s/p decortication for complicated pleural effusion.  Probable complicated parapneumonic effusion.  Cultures thus far are negative.  Repeat cultures have been sent and will follow closely.  Monitor chest tube output.  2.  Postop pain control.  Pain control has been difficult as patient has been on high-dose methadone at home.  I will increase oxycodone to 7.5 mg for now.  Increase Toradol.  Monitor renal function.  Had GI prophylaxis with Protonix.  3.  CT surgery following postop course closely.  Plan is to continue chest tube drainage up until early next week.  4.  Antibiotics per ID team.  Remains on vancomycin, Rocephin and Flagyl.  5.  Daily chest x-ray  6.  Incentive spirometry.  7.  Diet as tolerated.  8.  Renal function and electrolytes stable for now, continue monitoring.  .  9.  DVT prophylaxis with Lovenox if okay with CT surgery.  10.  Out of bed as tolerated.      Continue close ICU monitoring.    Plan of care and goals reviewed with multidisciplinary/antibiotic stewardship team during rounds.   I discussed the patient's findings and my recommendations with patient and nursing staff       aPul Plummer MD, [facility]  Pulmonary, Critical care and Sleep Medicine

## 2024-01-12 NOTE — PROGRESS NOTES
INFECTIOUS DISEASE Progress Note    Lion Solis  1990  [id number]    Date of consult: 1/9/24    Admit date: 1/8/2024    Requesting Provider: Trae Zayas MD   Evaluating physician: Byron Curiel MD  Reason for Consultation:     IVDU, pleural effusion, GPC in blood     Chief Complaint: Chest pain      Subjective   History of present illness:  Lion Solis is a  33 y.o.  Yr old male with obesity, h/o vaping, chronic methadone use, and IV drug use with fentanyl who presented on 1/8 for evaluation of right-sided chest pain. He did have a recent upper respiratory illness resolved about 10 days prior to arrival.  About 5 days prior to arrival he developed right-sided chest pain was evaluated in urgent treatment center and was noted to have a fever up to 101°F.  He was diagnosed with a muscle strain was given a steroid shot and naproxen.  His pain will progressed to the point where he presented to the ER yesterday due to increased pain.  The pain on the right side of his chest is pleuritic/worse with deep inspiration.  He is not having any sputum production.      Since arrival, T-max has been 101°F.  He has been on 2 L nasal cannula supplemental O2 with SPO2 in the low 90s.  He was initially tachycardic up to 126 bpm but this is improved. White blood cell count was initially 17.97 but is trended up slightly to 18.66. MRSA PCR nares is positive. Urine drug screen was positive for methadone, opiates, and fentanyl. Respiratory viral PCR panel was negative. One blood culture set is positive for coagulase-negative staph with final ID in progress. Repeat blood cultures were sent today. HIV fourth-generation screen was negative. Chest x-ray showed bilateral pleural effusions.  CTA chest showed small loculated right pleural effusion and areas of compressive atelectasis of the right middle and right lower lobes and mild atelectasis of the left lower lobe. Right-sided thoracentesis procedure was done yesterday  yielding 360 cc of slightly cloudy fluid. Pleural fluid protein was elevated to 5.9 and LDH was elevated to 809 consistent with an exudate.  Pleural fluid Gram stain showed no organisms and culture is preliminarily no growth to date. A TTE was ordered and pending. The patient has been on IV vancomycin and ceftriaxone by the primary team. ID has been consult for antibiotic recommendations in the setting of the patient's positive blood culture, IV drug abuse, and pleural effusion that is exudative.    Subjective:    1/10/24: Patient is feeling slightly better today. Did have some fevers up to 100.9°F last night but has been afebrile today.  He is on 2 L nasal cannula Supplemental O2.  Still with a cough but no worse.  Still with some right-sided pleuritic chest pain.  CT surgery has evaluated the patient and plans for thoracotomy with decortication tomorrow.    1/11/24: patient underwent thoracotomy and decortication procedure today. Has right-sided chest tube in place. Did have some fevers yesterday evening but improved today. WBC is trending down. No new complaints today.     1/12/24: the patient is having some right-sided pleuritic chest pain but no worse. Right-sided chest tube remains in place. Fevers have resolved. No n/v/d. No new rashes reported. Surgical cultures are prelim NGTD.    Past Medical History:   Diagnosis Date    MRSA infection        Past Surgical History:   Procedure Laterality Date    BRONCHOSCOPY THORACOTOMY Right 1/11/2024    Procedure: THORACOTOMY WITH DECORTICATION;  Surgeon: Douglas Vargas MD;  Location: [facility];  Service: Cardiothoracic;  Laterality: Right;       Pediatric History   Patient Parents    Not on file     Other Topics Concern    Not on file   Social History Narrative    Not on file   The patient uses IV drugs.  He claims to use heroin but he notes that fentanyl was positive and his UDS and it was probably fentanyl.  He does Vape. He is currently unemployed.  He is  .    family history is not on file.    No Known Allergies      There is no immunization history on file for this patient.    Medication:    Current Facility-Administered Medications:     acetaminophen (TYLENOL) tablet 650 mg, 650 mg, Oral, Q4H PRN, 650 mg at 01/11/24 0549 **OR** acetaminophen (TYLENOL) 160 MG/5ML oral solution 650 mg, 650 mg, Oral, Q4H PRN, 650 mg at 01/09/24 2001 **OR** acetaminophen (TYLENOL) suppository 650 mg, 650 mg, Rectal, Q4H PRN, Jama Pascual PA    acetaminophen (TYLENOL) tablet 1,000 mg, 1,000 mg, Oral, Q8H, Jama Pascual PA, 1,000 mg at 01/12/24 1427    bisacodyl (DULCOLAX) EC tablet 5 mg, 5 mg, Oral, Daily PRN, Jama Pascual PA    bisacodyl (DULCOLAX) suppository 10 mg, 10 mg, Rectal, Daily PRN, Jama Pascual PA    Calcium Replacement - Follow Nurse / BPA Driven Protocol, , Does not apply, PRN, Jama Pascual PA    cefTRIAXone (ROCEPHIN) 2,000 mg in sodium chloride 0.9 % 100 mL IVPB, 2,000 mg, Intravenous, Q24H, Jama Pascual PA, Last Rate: 200 mL/hr at 01/12/24 1642, 2,000 mg at 01/12/24 1642    Enoxaparin Sodium (LOVENOX) syringe 40 mg, 40 mg, Subcutaneous, Q24H, Paul Plummer MD    gabapentin (NEURONTIN) capsule 100 mg, 100 mg, Oral, TID, Douglas Vargas MD, 100 mg at 01/12/24 1642    HYDROmorphone (DILAUDID) injection 0.5 mg, 0.5 mg, Intravenous, Q2H PRN, Douglas Vargas MD, 0.5 mg at 01/12/24 1642    ketorolac (TORADOL) injection 30 mg, 30 mg, Intravenous, Q6H PRN, Paul Plummer MD, 30 mg at 01/12/24 1222    Magnesium Standard Dose Replacement - Follow Nurse / BPA Driven Protocol, , Does not apply, PRN, Jama Pascual PA    melatonin tablet 10 mg, 10 mg, Oral, Nightly PRN, Jama Pascual PA, 10 mg at 01/09/24 2227    metroNIDAZOLE (FLAGYL) tablet 500 mg, 500 mg, Oral, Q8H, Jama Pascaul PA, 500 mg at 01/12/24 1427    niCARdipine (CARDENE) 25mg in 250mL NS infusion, 5-15 mg/hr, Intravenous, Titrated, aJma Pascual PA, Held at  01/11/24 1500    nicotine (NICODERM CQ) 21 MG/24HR patch 1 patch, 1 patch, Transdermal, Daily PRN, Jama Pascual PA    nitroglycerin (NITROSTAT) SL tablet 0.4 mg, 0.4 mg, Sublingual, Q5 Min PRN, Jama Pascual PA    ondansetron ODT (ZOFRAN-ODT) disintegrating tablet 4 mg, 4 mg, Oral, Q6H PRN **OR** ondansetron (ZOFRAN) injection 4 mg, 4 mg, Intravenous, Q6H PRN, Jama Pascual PA    oxyCODONE (ROXICODONE) immediate release tablet 7.5 mg, 7.5 mg, Oral, Q4H PRN, Paul Plummer MD, 7.5 mg at 01/12/24 1642    pantoprazole (PROTONIX) EC tablet 40 mg, 40 mg, Oral, Q AM, Paul Plummer MD, 40 mg at 01/12/24 1223    Pharmacy to dose vancomycin, , Does not apply, Continuous PRN, Jama Pascual PA    Phosphorus Replacement - Follow Nurse / BPA Driven Protocol, , Does not apply, PRN, Jama Pascual PA    polyethylene glycol (MIRALAX) packet 17 g, 17 g, Oral, Daily, Jama Pascual PA    polyethylene glycol (MIRALAX) packet 17 g, 17 g, Oral, Daily PRN, Jama Pascual PA    Potassium Replacement - Follow Nurse / BPA Driven Protocol, , Does not apply, PRN, Jama Pascual PA    sennosides-docusate (PERICOLACE) 8.6-50 MG per tablet 2 tablet, 2 tablet, Oral, BID, Jama Pascual PA, 2 tablet at 01/12/24 0914    sodium chloride 0.9 % flush 10 mL, 10 mL, Intravenous, Q12H, Jama Pascual PA, 10 mL at 01/12/24 0915    sodium chloride 0.9 % flush 10 mL, 10 mL, Intravenous, PRN, Jama Pascual PA    sodium chloride 0.9 % infusion 40 mL, 40 mL, Intravenous, PRN, Jama Pascual PA    vancomycin IVPB 1500 mg in 0.9% NaCl (Premix) 500 mL, 1,500 mg, Intravenous, Q8H, Byron Curiel MD, Last Rate: 333.3 mL/hr at 01/12/24 0735, 1,500 mg at 01/12/24 0735    Please refer to the medical record for a full medication list    Review of Systems:    As above    Physical Exam:   Vital Signs   Temp:  [97.7 °F (36.5 °C)-98.4 °F (36.9 °C)] 98.4 °F (36.9 °C)  Heart Rate:  [] 77  Resp:  [12-26] 22  BP: ()/(52-86)  "124/81    Temp  Min: 97.7 °F (36.5 °C)  Max: 98.4 °F (36.9 °C)  BP  Min: 97/56  Max: 139/80  Pulse  Min: 54  Max: 104  Resp  Min: 12  Max: 26  SpO2  Min: 89 %  Max: 95 %    Blood pressure 124/81, pulse 77, temperature 98.4 °F (36.9 °C), temperature source Oral, resp. rate 22, height 175 cm (68.9\"), weight 132 kg (291 lb 0.1 oz), SpO2 92%.  GENERAL: awake. Ill appearing. Morbidly obese.  HEENT:  Normocephalic, atraumatic.  No external oral lesions noted  EYES: No conjunctival injection. No icterus.   HEART: RRR, no murmur  LUNGS: Diminished breath sounds in the right lung base. Nonlabored breathing nasal cannula. Right-sided chest tube in place  ABDOMEN: Nondistended  GENITAL: no Talavera catheter  SKIN: no generalized rashes.  No peripheral stigmata of infective endocarditis noted. Does have multiple tattoos over his body including his torso.  PSYCHIATRIC: cooperative.  Appropriate mood and affect  EXT:  No cellulitic change.  NEURO: somnolent but wakes up easily. Oriented x4.  Normal speech and cognition    PIV site is without erythema    Results Review:   I reviewed the patient's new clinical results.  I reviewed the patient's new imaging results and agree with the interpretation.  I reviewed the patient's other test results and agree with the interpretation    Results from last 7 days   Lab Units 01/12/24  0602 01/11/24  0213 01/10/24  0659   WBC 10*3/mm3 16.58* 14.61* 17.71*   HEMOGLOBIN g/dL 11.7* 11.7* 11.8*   HEMATOCRIT % 35.4* 35.0* 35.4*   PLATELETS 10*3/mm3 279 212 218     Results from last 7 days   Lab Units 01/12/24  0602   SODIUM mmol/L 139   POTASSIUM mmol/L 4.0   CHLORIDE mmol/L 106   CO2 mmol/L 25.0   BUN mg/dL 13   CREATININE mg/dL 0.46*   GLUCOSE mg/dL 145*   CALCIUM mg/dL 8.6     Results from last 7 days   Lab Units 01/09/24  0509   ALK PHOS U/L 117   BILIRUBIN mg/dL 1.0   ALT (SGPT) U/L 52*   AST (SGOT) U/L 22         Results from last 7 days   Lab Units 01/11/24  0213   CRP mg/dL 28.31*     Results " "from last 7 days   Lab Units 01/11/24  0832 01/11/24  0213 01/10/24  0659   VANCOMYCIN TR mcg/mL 4.90*  --  4.80*   VANCOMYCIN RM mcg/mL  --  16.90  --      Results from last 7 days   Lab Units 01/08/24  0828   LACTATE mmol/L 1.2     Estimated Creatinine Clearance: 307.2 mL/min (A) (by C-G formula based on SCr of 0.46 mg/dL (L)).     Procalitonin Results:      Lab 01/11/24  0213 01/08/24  0838   PROCALCITONIN 0.37* 0.21      Brief Urine Lab Results       None           No results found for: \"SITE\", \"ALLENTEST\", \"PHART\", \"QSL2QZS\", \"PO2ART\", \"ASM0BWJ\", \"BASEEXCESS\", \"W8TQIJDH\", \"HGBBG\", \"HCTABG\", \"OXYHEMOGLOBI\", \"METHHGBN\", \"CARBOXYHGB\", \"CO2CT\", \"BAROMETRIC\", \"MODALITY\", \"FIO2\"     Microbiology:  Blood Culture   Date Value Ref Range Status   01/08/2024 Abnormal Stain (C)  Preliminary     BCID, PCR   Date Value Ref Range Status   01/08/2024 (A) Negative by BCID PCR. Culture to Follow. Final    Staph spp, not aureus or lugdunensis. Identification by BCID2 PCR.       Blood Culture   Date Value Ref Range Status   01/08/2024 Abnormal Stain (C)  Preliminary     Body Fluid Culture   Date Value Ref Range Status   01/08/2024 No growth  Preliminary     BCID, PCR   Date Value Ref Range Status   01/08/2024 (A) Negative by BCID PCR. Culture to Follow. Final    Staph spp, not aureus or lugdunensis. Identification by BCID2 PCR.   (      Radiology:  Imaging Results (Last 72 Hours)       Procedure Component Value Units Date/Time    XR Chest 1 View [[id number]] Collected: 01/12/24 0731     Updated: 01/12/24 0735    Narrative:      XR CHEST 1 VW    Date of Exam: 1/12/2024 1:46 AM EST    Indication: postop    Comparison: 1/11/2024    Findings:  Postsurgical changes of the right hemithorax with 2 right-sided chest tubes in similar position to the prior study. Previously seen small right basilar pneumothorax no longer clearly identified. There is improving right basilar atelectasis. Stable   cardiomegaly. Left lung clear. No significant " effusion.      Impression:      Impression:  1. Postsurgical changes of the right hemithorax with 2 right-sided chest tubes in place. Previously seen small right basilar pneumothorax no longer clearly identified.  2. Improving right basilar atelectasis.        Electronically Signed: Ron Rogers MD    1/12/2024 7:32 AM EST    Workstation ID: [id number]    XR Chest 1 View [[id number]] Collected: 01/11/24 1406     Updated: 01/11/24 1411    Narrative:      XR CHEST 1 VW    Date of Exam: 1/11/2024 12:35 PM CST    Indication: postop    Comparison: Chest x-ray 1/8/2024    Findings:  The heart is prominent yet stable in size. There our right pleural chest tubes with a pneumothorax along the lateral lung base measuring 7 mm. There is right basilar airspace disease and pleural effusion.        Impression:      Impression:    1. Right basilar airspace disease and pleural effusion.    2. Pneumothorax at the lateral right lung base.      Electronically Signed: Crystal Navarro MD    1/11/2024 1:08 PM CST    Workstation ID: [id number]        I read the patient's chest xray from today    IMPRESSION:     Problems:  Sepsis with fevers, tachycardia, and leukocytosis-Suspect possibly due to an empyema on the right side.  1 blood culture set was positive for staph epidermidis which likely represents a contaminate. TTE was negative for vegetations. Now s/p thoracotomy and decortication procedure on 1/11.  Right-sided Exudative pleural effusion, possible empyema   Staph epidermidis in 1 blood culture set so far-likely a contaminate  IV drug abuse  MRSA nasal colonization    RECOMMENDATIONS:    -Continue to follow CBC and BMP  -TTE was without any vegetations  -Continue ceftriaxone 2 g IV every 24 hours and IV vancomycin with pharmacy dosing assistance for now.  -Continue flagyl 500 mg PO TID for some anaerobic coverage for possible empyema  -Status post thoracotomy and decortication procedure on 1/11. I will follow cultures from this  procedure, currently NGTD    The patient is not a good candidate for outpatient IV antibiotics with a PICC line in the setting of his recent IV drug abuse.  I discussed this with him today.    I discussed with the patient and his wife at bedside today    I will be off over the weekend. Please page one of my on call partners if the patient declines or if his surgical cultures turn positive.    Thank you for asking me to see Lion Solis.      Complex MDM    Byron Curiel MD  1/12/2024

## 2024-01-12 NOTE — PROGRESS NOTES
CTS Progress Note       LOS: 3 days   Patient Care Team:  Provider, No Known as PCP - General    Chief Complaint: Loculated pleural effusion    Vital Signs:  Temp:  [97.5 °F (36.4 °C)-99.3 °F (37.4 °C)] 97.8 °F (36.6 °C)  Heart Rate:  [] 57  Resp:  [12-26] 12  BP: ()/(52-88) 101/56    Physical Exam:  Up in chair breathing unlabored     Results:     Results from last 7 days   Lab Units 01/12/24  0602   WBC 10*3/mm3 16.58*   HEMOGLOBIN g/dL 11.7*   HEMATOCRIT % 35.4*   PLATELETS 10*3/mm3 279     Results from last 7 days   Lab Units 01/12/24  0602   SODIUM mmol/L 139   POTASSIUM mmol/L 4.0   CHLORIDE mmol/L 106   CO2 mmol/L 25.0   BUN mg/dL 13   CREATININE mg/dL 0.46*   GLUCOSE mg/dL 145*   CALCIUM mg/dL 8.6           Imaging Results (Last 24 Hours)       Procedure Component Value Units Date/Time    XR Chest 1 View [[id number]] Resulted: 01/12/24 0146     Updated: 01/12/24 0201    XR Chest 1 View [[id number]] Collected: 01/11/24 1406     Updated: 01/11/24 1411    Narrative:      XR CHEST 1 VW    Date of Exam: 1/11/2024 12:35 PM CST    Indication: postop    Comparison: Chest x-ray 1/8/2024    Findings:  The heart is prominent yet stable in size. There our right pleural chest tubes with a pneumothorax along the lateral lung base measuring 7 mm. There is right basilar airspace disease and pleural effusion.        Impression:      Impression:    1. Right basilar airspace disease and pleural effusion.    2. Pneumothorax at the lateral right lung base.      Electronically Signed: Crystal Navarro MD    1/11/2024 1:08 PM CST    Workstation ID: [id number]            Assessment      Loculated pleural effusion    IVDU (intravenous drug user)    Pleural effusion    Satisfactory course less than 24 hours postoperative    Plan   Do not remove chest tubes until at least Monday, January 15  Chest x-ray in the a.m.    Please note that portions of this note were completed with a voice recognition program. Efforts were made to  edit the dictations, but occasionally words are mistranscribed.    Douglas Vargas MD  01/12/24  07:28 EST

## 2024-01-13 ENCOUNTER — APPOINTMENT (OUTPATIENT)
Dept: GENERAL RADIOLOGY | Facility: HOSPITAL | Age: 34
End: 2024-01-13
Payer: COMMERCIAL

## 2024-01-13 LAB
ANION GAP SERPL CALCULATED.3IONS-SCNC: 9 MMOL/L (ref 5–15)
BACTERIA SPEC AEROBE CULT: NORMAL
BACTERIA SPEC AEROBE CULT: NORMAL
BACTERIA SPEC ANAEROBE CULT: NORMAL
BASOPHILS # BLD AUTO: 0.03 10*3/MM3 (ref 0–0.2)
BASOPHILS NFR BLD AUTO: 0.2 % (ref 0–1.5)
BH BB BLOOD EXPIRATION DATE: NORMAL
BH BB BLOOD EXPIRATION DATE: NORMAL
BH BB BLOOD TYPE BARCODE: 6200
BH BB BLOOD TYPE BARCODE: 6200
BH BB DISPENSE STATUS: NORMAL
BH BB DISPENSE STATUS: NORMAL
BH BB PRODUCT CODE: NORMAL
BH BB PRODUCT CODE: NORMAL
BH BB UNIT NUMBER: NORMAL
BH BB UNIT NUMBER: NORMAL
BUN SERPL-MCNC: 19 MG/DL (ref 6–20)
BUN/CREAT SERPL: 35.8 (ref 7–25)
CALCIUM SPEC-SCNC: 8 MG/DL (ref 8.6–10.5)
CHLORIDE SERPL-SCNC: 106 MMOL/L (ref 98–107)
CO2 SERPL-SCNC: 25 MMOL/L (ref 22–29)
CREAT SERPL-MCNC: 0.53 MG/DL (ref 0.76–1.27)
CROSSMATCH INTERPRETATION: NORMAL
CROSSMATCH INTERPRETATION: NORMAL
DEPRECATED RDW RBC AUTO: 40.9 FL (ref 37–54)
EGFRCR SERPLBLD CKD-EPI 2021: 135.7 ML/MIN/1.73
EOSINOPHIL # BLD AUTO: 0.12 10*3/MM3 (ref 0–0.4)
EOSINOPHIL NFR BLD AUTO: 0.9 % (ref 0.3–6.2)
ERYTHROCYTE [DISTWIDTH] IN BLOOD BY AUTOMATED COUNT: 13.2 % (ref 12.3–15.4)
GLUCOSE SERPL-MCNC: 87 MG/DL (ref 65–99)
HCT VFR BLD AUTO: 35 % (ref 37.5–51)
HGB BLD-MCNC: 11.5 G/DL (ref 13–17.7)
IMM GRANULOCYTES # BLD AUTO: 0.23 10*3/MM3 (ref 0–0.05)
IMM GRANULOCYTES NFR BLD AUTO: 1.7 % (ref 0–0.5)
LYMPHOCYTES # BLD AUTO: 2.68 10*3/MM3 (ref 0.7–3.1)
LYMPHOCYTES NFR BLD AUTO: 20.3 % (ref 19.6–45.3)
MAGNESIUM SERPL-MCNC: 2.1 MG/DL (ref 1.6–2.6)
MCH RBC QN AUTO: 28 PG (ref 26.6–33)
MCHC RBC AUTO-ENTMCNC: 32.9 G/DL (ref 31.5–35.7)
MCV RBC AUTO: 85.2 FL (ref 79–97)
MONOCYTES # BLD AUTO: 0.71 10*3/MM3 (ref 0.1–0.9)
MONOCYTES NFR BLD AUTO: 5.4 % (ref 5–12)
NEUTROPHILS NFR BLD AUTO: 71.5 % (ref 42.7–76)
NEUTROPHILS NFR BLD AUTO: 9.44 10*3/MM3 (ref 1.7–7)
NRBC BLD AUTO-RTO: 0 /100 WBC (ref 0–0.2)
PLATELET # BLD AUTO: 300 10*3/MM3 (ref 140–450)
PMV BLD AUTO: 9.5 FL (ref 6–12)
POTASSIUM SERPL-SCNC: 3.3 MMOL/L (ref 3.5–5.2)
POTASSIUM SERPL-SCNC: 3.4 MMOL/L (ref 3.5–5.2)
RBC # BLD AUTO: 4.11 10*6/MM3 (ref 4.14–5.8)
SODIUM SERPL-SCNC: 140 MMOL/L (ref 136–145)
UNIT  ABO: NORMAL
UNIT  ABO: NORMAL
UNIT  RH: NORMAL
UNIT  RH: NORMAL
VANCOMYCIN SERPL-MCNC: 13.1 MCG/ML (ref 5–40)
WBC NRBC COR # BLD AUTO: 13.21 10*3/MM3 (ref 3.4–10.8)

## 2024-01-13 PROCEDURE — 99024 POSTOP FOLLOW-UP VISIT: CPT | Performed by: PHYSICIAN ASSISTANT

## 2024-01-13 PROCEDURE — 84132 ASSAY OF SERUM POTASSIUM: CPT | Performed by: PHYSICIAN ASSISTANT

## 2024-01-13 PROCEDURE — 83735 ASSAY OF MAGNESIUM: CPT | Performed by: INTERNAL MEDICINE

## 2024-01-13 PROCEDURE — 25010000002 HYDROMORPHONE PER 4 MG: Performed by: PHYSICIAN ASSISTANT

## 2024-01-13 PROCEDURE — 99232 SBSQ HOSP IP/OBS MODERATE 35: CPT | Performed by: INTERNAL MEDICINE

## 2024-01-13 PROCEDURE — 25010000002 KETOROLAC TROMETHAMINE PER 15 MG: Performed by: NURSE PRACTITIONER

## 2024-01-13 PROCEDURE — 71045 X-RAY EXAM CHEST 1 VIEW: CPT

## 2024-01-13 PROCEDURE — 80048 BASIC METABOLIC PNL TOTAL CA: CPT | Performed by: INTERNAL MEDICINE

## 2024-01-13 PROCEDURE — 25010000002 CEFTRIAXONE PER 250 MG: Performed by: PHYSICIAN ASSISTANT

## 2024-01-13 PROCEDURE — 80202 ASSAY OF VANCOMYCIN: CPT | Performed by: INTERNAL MEDICINE

## 2024-01-13 PROCEDURE — 85025 COMPLETE CBC W/AUTO DIFF WBC: CPT | Performed by: INTERNAL MEDICINE

## 2024-01-13 PROCEDURE — 25810000003 SODIUM CHLORIDE 0.9 % SOLUTION: Performed by: INTERNAL MEDICINE

## 2024-01-13 PROCEDURE — 25010000002 HYDROMORPHONE PER 4 MG: Performed by: THORACIC SURGERY (CARDIOTHORACIC VASCULAR SURGERY)

## 2024-01-13 PROCEDURE — 25010000002 VANCOMYCIN 10 G RECONSTITUTED SOLUTION: Performed by: INTERNAL MEDICINE

## 2024-01-13 RX ORDER — POTASSIUM CHLORIDE 20 MEQ/1
40 TABLET, EXTENDED RELEASE ORAL EVERY 4 HOURS
Status: COMPLETED | OUTPATIENT
Start: 2024-01-13 | End: 2024-01-13

## 2024-01-13 RX ADMIN — OXYCODONE HYDROCHLORIDE 7.5 MG: 15 TABLET ORAL at 18:31

## 2024-01-13 RX ADMIN — METRONIDAZOLE 500 MG: 500 TABLET ORAL at 21:48

## 2024-01-13 RX ADMIN — OXYCODONE HYDROCHLORIDE 7.5 MG: 15 TABLET ORAL at 02:41

## 2024-01-13 RX ADMIN — GABAPENTIN 100 MG: 100 CAPSULE ORAL at 20:26

## 2024-01-13 RX ADMIN — HYDROMORPHONE HYDROCHLORIDE 0.5 MG: 1 INJECTION, SOLUTION INTRAMUSCULAR; INTRAVENOUS; SUBCUTANEOUS at 08:00

## 2024-01-13 RX ADMIN — OXYCODONE HYDROCHLORIDE 7.5 MG: 15 TABLET ORAL at 22:20

## 2024-01-13 RX ADMIN — METRONIDAZOLE 500 MG: 500 TABLET ORAL at 05:18

## 2024-01-13 RX ADMIN — HYDROMORPHONE HYDROCHLORIDE 0.5 MG: 1 INJECTION, SOLUTION INTRAMUSCULAR; INTRAVENOUS; SUBCUTANEOUS at 17:20

## 2024-01-13 RX ADMIN — GABAPENTIN 100 MG: 100 CAPSULE ORAL at 16:06

## 2024-01-13 RX ADMIN — ACETAMINOPHEN 1000 MG: 500 TABLET, FILM COATED ORAL at 05:18

## 2024-01-13 RX ADMIN — ACETAMINOPHEN 1000 MG: 500 TABLET, FILM COATED ORAL at 13:53

## 2024-01-13 RX ADMIN — HYDROMORPHONE HYDROCHLORIDE 0.5 MG: 1 INJECTION, SOLUTION INTRAMUSCULAR; INTRAVENOUS; SUBCUTANEOUS at 10:31

## 2024-01-13 RX ADMIN — HYDROMORPHONE HYDROCHLORIDE 0.5 MG: 1 INJECTION, SOLUTION INTRAMUSCULAR; INTRAVENOUS; SUBCUTANEOUS at 04:51

## 2024-01-13 RX ADMIN — KETOROLAC TROMETHAMINE 30 MG: 30 INJECTION, SOLUTION INTRAMUSCULAR at 11:51

## 2024-01-13 RX ADMIN — HYDROMORPHONE HYDROCHLORIDE 0.5 MG: 1 INJECTION, SOLUTION INTRAMUSCULAR; INTRAVENOUS; SUBCUTANEOUS at 13:53

## 2024-01-13 RX ADMIN — METRONIDAZOLE 500 MG: 500 TABLET ORAL at 13:53

## 2024-01-13 RX ADMIN — PANTOPRAZOLE SODIUM 40 MG: 40 TABLET, DELAYED RELEASE ORAL at 05:18

## 2024-01-13 RX ADMIN — Medication 10 ML: at 11:44

## 2024-01-13 RX ADMIN — POLYETHYLENE GLYCOL 3350 17 G: 17 POWDER, FOR SOLUTION ORAL at 08:00

## 2024-01-13 RX ADMIN — HYDROMORPHONE HYDROCHLORIDE 0.5 MG: 1 INJECTION, SOLUTION INTRAMUSCULAR; INTRAVENOUS; SUBCUTANEOUS at 20:26

## 2024-01-13 RX ADMIN — POTASSIUM CHLORIDE 40 MEQ: 1500 TABLET, EXTENDED RELEASE ORAL at 18:26

## 2024-01-13 RX ADMIN — POTASSIUM CHLORIDE 40 MEQ: 1500 TABLET, EXTENDED RELEASE ORAL at 11:51

## 2024-01-13 RX ADMIN — OXYCODONE HYDROCHLORIDE 7.5 MG: 15 TABLET ORAL at 11:51

## 2024-01-13 RX ADMIN — KETOROLAC TROMETHAMINE 30 MG: 30 INJECTION, SOLUTION INTRAMUSCULAR at 06:03

## 2024-01-13 RX ADMIN — OXYCODONE HYDROCHLORIDE 7.5 MG: 15 TABLET ORAL at 07:59

## 2024-01-13 RX ADMIN — Medication 10 ML: at 20:29

## 2024-01-13 RX ADMIN — Medication 10 MG: at 20:26

## 2024-01-13 RX ADMIN — KETOROLAC TROMETHAMINE 30 MG: 30 INJECTION, SOLUTION INTRAMUSCULAR at 00:11

## 2024-01-13 RX ADMIN — VANCOMYCIN HYDROCHLORIDE 1500 MG: 10 INJECTION, POWDER, LYOPHILIZED, FOR SOLUTION INTRAVENOUS at 00:11

## 2024-01-13 RX ADMIN — VANCOMYCIN HYDROCHLORIDE 1500 MG: 10 INJECTION, POWDER, LYOPHILIZED, FOR SOLUTION INTRAVENOUS at 08:05

## 2024-01-13 RX ADMIN — CEFTRIAXONE 2000 MG: 2 INJECTION, POWDER, FOR SOLUTION INTRAMUSCULAR; INTRAVENOUS at 16:05

## 2024-01-13 RX ADMIN — SENNOSIDES AND DOCUSATE SODIUM 2 TABLET: 8.6; 5 TABLET ORAL at 08:00

## 2024-01-13 RX ADMIN — GABAPENTIN 100 MG: 100 CAPSULE ORAL at 11:51

## 2024-01-13 NOTE — PROGRESS NOTES
Pharmacy Consult-Vancomycin Dosing  Lion Solis is a  33 y.o. male receiving vancomycin therapy.     Indication: Suspected bacteremia/endocarditis  Consulting Provider: Luis PACHECO Consult: Yes    Goal AUC: 400 - 600 mg/L*hr    Current Antimicrobial Therapy  Anti-Infectives (From admission, onward)      Ordered     Dose/Rate Route Frequency Start Stop    01/11/24 1400  vancomycin IVPB 1500 mg in 0.9% NaCl (Premix) 500 mL        Ordering Provider: yBron Curiel MD    1,500 mg  333.3 mL/hr over 90 Minutes Intravenous Every 8 Hours 01/11/24 1600 01/14/24 0840    01/09/24 1310  ceFAZolin in Sodium Chloride (ANCEF) IVPB solution 3,000 mg        Ordering Provider: Gaudencio Justice PA    3,000 mg  200 mL/hr over 30 Minutes Intravenous Once 01/11/24 1030 01/11/24 1117    01/09/24 0848  metroNIDAZOLE (FLAGYL) tablet 500 mg        Ordering Provider: Jama Pascual PA    500 mg Oral Every 8 Hours Scheduled 01/09/24 0900 01/16/24 1359    01/08/24 1504  vancomycin 2750 mg/500 mL 0.9% NS IVPB ([facility])        Ordering Provider: Marito Cross RPH    20 mg/kg × 132 kg  over 165 Minutes Intravenous Once 01/08/24 1800 01/09/24 0135    01/08/24 1401  cefTRIAXone (ROCEPHIN) 2,000 mg in sodium chloride 0.9 % 100 mL IVPB        Ordering Provider: Jama Pascual PA    2,000 mg  200 mL/hr over 30 Minutes Intravenous Every 24 Hours 01/08/24 1700 01/15/24 1659    01/08/24 1401  Pharmacy to dose vancomycin        Ordering Provider: Jama Pascual PA     Does not apply Continuous PRN 01/08/24 1600 01/15/24 1559            Allergies  Allergies as of 01/08/2024    (No Known Allergies)       Labs    Results from last 7 days   Lab Units 01/13/24  0711 01/12/24  0602 01/11/24  0213   BUN mg/dL 19 13 12   CREATININE mg/dL 0.53* 0.46* 0.56*       Results from last 7 days   Lab Units 01/13/24  0711 01/12/24  0602 01/11/24  0213   WBC 10*3/mm3 13.21* 16.58* 14.61*       Evaluation of Dosing     Last Dose Received in the ED/Outside  "Facility: N/A  Is Patient on Dialysis or Renal Replacement: No    Ht - 175 cm (68.9\")  Wt - 132 kg (291 lb 0.1 oz)    Estimated Creatinine Clearance: 266.7 mL/min (A) (by C-G formula based on SCr of 0.53 mg/dL (L)).    Intake & Output (last 3 days)         01/10 0701 01/11 0700 01/11 0701 01/12 0700 01/12 0701 01/13 0700 01/13 0701 01/14 0700    P.O. 720 900 600     I.V. (mL/kg)  934.6 (7.1) 80.5 (0.6)     IV Piggyback  961 1600     Total Intake(mL/kg) 720 (5.5) 2795.6 (21.2) 2280.5 (17.3)     Urine (mL/kg/hr) 1550 (0.5) 1325 (0.4) 550 (0.2)     Blood  350      Chest Tube  473 120     Total Output 1550 2148 670     Net -830 +647.6 +1610.5                     Microbiology and Radiology  Microbiology Results (last 10 days)       Procedure Component Value - Date/Time    Tissue / Bone Culture - Tissue, Pleural Cavity [[id number]] Collected: 01/11/24 1142    Lab Status: Preliminary result Specimen: Tissue from Pleural Cavity Updated: 01/13/24 0815     Tissue Culture No growth at 2 days     Gram Stain Few (2+) WBCs seen      No organisms seen    AFB Culture - Tissue, Pleural Cavity [[id number]] Collected: 01/11/24 1142    Lab Status: Preliminary result Specimen: Tissue from Pleural Cavity Updated: 01/12/24 1351     AFB Stain No acid fast bacilli seen on concentrated smear    Blood Culture - Blood, Hand, Left [[id number]]  (Normal) Collected: 01/09/24 0525    Lab Status: Preliminary result Specimen: Blood from Hand, Left Updated: 01/13/24 0715     Blood Culture No growth at 4 days    AFB Culture - Body Fluid, Pleural Cavity [[id number]] Collected: 01/08/24 1618    Lab Status: Preliminary result Specimen: Body Fluid from Pleural Cavity Updated: 01/09/24 1306     AFB Stain No acid fast bacilli seen on concentrated smear    Body Fluid Culture - Body Fluid, Pleural Cavity [[id number]] Collected: 01/08/24 1618    Lab Status: Final result Specimen: Body Fluid from Pleural Cavity Updated: 01/11/24 0817     Body Fluid Culture No " growth at 3 days     Gram Stain Few (2+) WBCs seen      Few (2+) Red blood cells      No organisms seen    Anaerobic Culture - Pleural Fluid, Pleural Cavity [[id number]]  (Normal) Collected: 01/08/24 1618    Lab Status: Preliminary result Specimen: Pleural Fluid from Pleural Cavity Updated: 01/11/24 0719     Anaerobic Culture No anaerobes isolated at 3 days    Fungus Smear - Body Fluid, Pleural Cavity [[id number]] Collected: 01/08/24 1618    Lab Status: Final result Specimen: Body Fluid from Pleural Cavity Updated: 01/09/24 1335     Fungal Stain No fungal elements seen    MRSA Screen, PCR (Inpatient) - Swab, Nares [[id number]]  (Abnormal) Collected: 01/08/24 1450    Lab Status: Final result Specimen: Swab from Nares Updated: 01/08/24 1650     MRSA PCR Positive    Narrative:      The negative predictive value of this diagnostic test is high and should only be used to consider de-escalating anti-MRSA therapy. A positive result may indicate colonization with MRSA and must be correlated clinically.    Blood Culture - Blood, Arm, Right [[id number]]  (Normal) Collected: 01/08/24 1434    Lab Status: Preliminary result Specimen: Blood from Arm, Right Updated: 01/12/24 1500     Blood Culture No growth at 4 days    Narrative:      Aerobic Bottle Only    Less than seven (7) mL's of blood was collected.  Insufficient quantity may yield false negative results.    Blood Culture - Blood, Arm, Left [[id number]]  (Abnormal)  (Susceptibility) Collected: 01/08/24 0830    Lab Status: Edited Result - FINAL Specimen: Blood from Arm, Left Updated: 01/11/24 0921     Blood Culture Staphylococcus epidermidis     Isolated from Anaerobic Bottle     Gram Stain Anaerobic Bottle Gram positive cocci in clusters    Narrative:      Dr Curiel called and requested further workup    Less than seven (7) mL's of blood was collected.  Insufficient quantity may yield false negative results.    Susceptibility        Staphylococcus epidermidis      JUANJO       Oxacillin <=0.25 ug/ml Susceptible      Vancomycin 2 ug/ml Susceptible                           Blood Culture ID, PCR - Blood, Arm, Left [[id number]]  (Abnormal) Collected: 01/08/24 0830    Lab Status: Final result Specimen: Blood from Arm, Left Updated: 01/09/24 0545     BCID, PCR Staph spp, not aureus or lugdunensis. Identification by BCID2 PCR.     BOTTLE TYPE Anaerobic Bottle    Blood Culture - Blood, Arm, Left [[id number]]  (Normal) Collected: 01/08/24 0828    Lab Status: Final result Specimen: Blood from Arm, Left Updated: 01/13/24 0846     Blood Culture No growth at 5 days    Narrative:      Less than seven (7) mL's of blood was collected.  Insufficient quantity may yield false negative results.    COVID PRE-OP / PRE-PROCEDURE SCREENING ORDER (NO ISOLATION) - Swab, Nasopharynx [[id number]]  (Normal) Collected: 01/08/24 0805    Lab Status: Final result Specimen: Swab from Nasopharynx Updated: 01/08/24 0915    Narrative:      The following orders were created for panel order COVID PRE-OP / PRE-PROCEDURE SCREENING ORDER (NO ISOLATION) - Swab, Nasopharynx.  Procedure                               Abnormality         Status                     ---------                               -----------         ------                     Respiratory Panel PCR w/...[[id number]]  Normal              Final result                 Please view results for these tests on the individual orders.    Respiratory Panel PCR w/COVID-19(SARS-CoV-2) VERONICA/MARCELO/ISIDRO/PAD/COR/JOHN In-House, NP Swab in UTM/VTM, 2 HR TAT - Swab, Nasopharynx [[id number]]  (Normal) Collected: 01/08/24 0805    Lab Status: Final result Specimen: Swab from Nasopharynx Updated: 01/08/24 0915     ADENOVIRUS, PCR Not Detected     Coronavirus 229E Not Detected     Coronavirus HKU1 Not Detected     Coronavirus NL63 Not Detected     Coronavirus OC43 Not Detected     COVID19 Not Detected     Human Metapneumovirus Not Detected     Human Rhinovirus/Enterovirus Not Detected      Influenza A PCR Not Detected     Influenza B PCR Not Detected     Parainfluenza Virus 1 Not Detected     Parainfluenza Virus 2 Not Detected     Parainfluenza Virus 3 Not Detected     Parainfluenza Virus 4 Not Detected     RSV, PCR Not Detected     Bordetella pertussis pcr Not Detected     Bordetella parapertussis PCR Not Detected     Chlamydophila pneumoniae PCR Not Detected     Mycoplasma pneumo by PCR Not Detected    Narrative:      In the setting of a positive respiratory panel with a viral infection PLUS a negative procalcitonin without other underlying concern for bacterial infection, consider observing off antibiotics or discontinuation of antibiotics and continue supportive care. If the respiratory panel is positive for atypical bacterial infection (Bordetella pertussis, Chlamydophila pneumoniae, or Mycoplasma pneumoniae), consider antibiotic de-escalation to target atypical bacterial infection.            Reported Vancomycin Levels    Results from last 7 days   Lab Units 01/13/24  0711 01/11/24  0213   VANCOMYCIN RM mcg/mL 13.10 16.90         Results from last 7 days   Lab Units 01/11/24  0832 01/10/24  0659   VANCOMYCIN TR mcg/mL 4.90* 4.80*          [employer] AUC Calculation:    Current AUC:   555 mg/L*hr    Predicted Steady State AUC on Current Dose: 497 mg/L*hr  _________________________________    Predicted Steady State AUC on New Dose: --    Assessment/Plan:  1. Pharmacy to dose vancomycin for sepsis, possible empyema. Goal -600 mg/L*hr.  2. Vancomycin trough this AM (~7h post-dose)  is 13.1mg/L.  3. Continue maintenance dose to vancomycin 1500mg (~11mg/kg) IV Q8hr.  4. Will repeat vancomycin level in 2-3 days if therapy duration extended.  5. Pharmacy will continue to monitor renal function, cultures and sensitivities, and clinical status to adjust regimen as necessary.    Miroslava Gandara, PharmD  01/13/24  08:51 EST

## 2024-01-13 NOTE — PROGRESS NOTES
Intensive Care Follow-up     Hospital:  LOS: 4 days   Mr. Lion Solis, 33 y.o. male is followed for:   Loculated pleural effusion   Postoperative hemodynamic, electrolyte and respiratory management       History of present illness:   33-year-old male with past medical history of IV drug use on chronic methadone, ongoing vaping and obesity.  Patient presented with complaints of worsening right-sided chest pain and shortness of breath.  Patient recently had upper respiratory infection and was seen at [facility] 5 days PTA and he was having chest pain at that time which was thought to be muscle strain and he was given a steroid injection and naproxen.  He was febrile at that time.  Upon admission to ER.  He had elevated white blood cell count.  CTA chest was done which showed loculated right pleural effusion with significant compressive atelectasis of right upper middle and lower lobe.  Right lower lobe significant atelectasis.  Patient underwent thoracentesis and results showed exudative neutrophilic fluid.  Gram stain was negative.  Blood culture grew 1 out of 2 staph coag negative which was thought to be a contaminant.  HIV test was negative.  Patient was seen by infectious disease as well as CT surgery and was recommended to undergo decortication.  Patient was taken to operating room on 1/11 by Dr. Vargas and underwent right chest decortication and pleural fluid drainage.  Multiple samples have been sent for cultures.  Postprocedure patient is admitted to intensive care unit.    Interval history:   POD: 2 Days Post-Op    Doing well.  Temp  Min: 98 °F (36.7 °C)  Max: 98.9 °F (37.2 °C)       History     Last Reviewed by Huber Aparicio MD on 1/13/2024 at  3:43 PM    Sections Reviewed    Medical, Surgical, Family, Tobacco, Alcohol, Drug Use, Sexual Activity,   Social Documentation    Problem list reviewed by Huber Aparicio MD on 1/13/2024 at  3:43 PM  Medicines reviewed by Huber Aparicio MD on 1/13/2024 at  3:43  "PM  Allergies reviewed by Huber Aparicio MD on 1/13/2024 at  3:43 PM        Objective     Vital Sign Min/Max for last 24 hours  Temp  Min: 98 °F (36.7 °C)  Max: 98.9 °F (37.2 °C)   BP  Min: 116/75  Max: 139/90   Pulse  Min: 58  Max: 93   Resp  Min: 16  Max: 24   SpO2  Min: 91 %  Max: 96 %   Flow (L/min)  Min: 1  Max: 1       Input/Output for last 24 hour shift  01/12 0701 - 01/13 0700  In: 2280.5 [P.O.:600; I.V.:80.5]  Out: 670 [Urine:550]      Objective:  Vital signs: (most recent): Blood pressure 138/78, pulse 66, temperature 98.6 °F (37 °C), temperature source Axillary, resp. rate 18, height 175 cm (68.9\"), weight 132 kg (291 lb 0.1 oz), SpO2 93%.            General Appearance: Awake, alert in mild distress due to pain   Lungs:    B/L Breath sounds present with decreased breath sounds on bases.   Chest tube in place and draining dark serosanguineous fluid, no air leak.  Heart:  S1 and S2 present, no murmur  Abdomen:  Soft, nontender, no guarding or rigidity, bowel sounds positive.  Extremities:    No edema, warm to touch.  Neurologic:   Moving all four extremities. No focal deficit.     Results from last 7 days   Lab Units 01/13/24  0711 01/12/24  0602 01/11/24  0213   WBC 10*3/mm3 13.21* 16.58* 14.61*   HEMOGLOBIN g/dL 11.5* 11.7* 11.7*   PLATELETS 10*3/mm3 300 279 212     Results from last 7 days   Lab Units 01/13/24  0711 01/12/24  0602 01/11/24  0213   SODIUM mmol/L 140 139 136   POTASSIUM mmol/L 3.3* 4.0 3.7   CO2 mmol/L 25.0 25.0 23.0   BUN mg/dL 19 13 12   CREATININE mg/dL 0.53* 0.46* 0.56*   GLUCOSE mg/dL 87 145* 85            Estimated Creatinine Clearance: 266.7 mL/min (A) (by C-G formula based on SCr of 0.53 mg/dL (L)).    XR Chest 1 View    Result Date: 1/13/2024  Impression: Mildly increased linear atelectasis in the right basal lung. Small right layering pleural effusion. No substantial pneumothorax seen. Electronically Signed: Phoenix Cook MD  1/13/2024 9:25 AM EST  Workstation ID: " [id number]    XR Chest 1 View    Result Date: 1/12/2024  Impression: 1. Postsurgical changes of the right hemithorax with 2 right-sided chest tubes in place. Previously seen small right basilar pneumothorax no longer clearly identified. 2. Improving right basilar atelectasis. Electronically Signed: Ron Rogers MD  1/12/2024 7:32 AM EST  Workstation ID: [id number]     Assessment & Plan   Impression        Loculated pleural effusion for complicated pleural effusion   Procedure(s) (LRB):  THORACOTOMY WITH DECORTICATION (Right)   Dr. Sam WARE (intravenous drug user)       Plan        Postop pain control.  Continue chest tube until early next week as per CTS  Antibiotics per ID team.  Hypokalemia today. Replace K and check Magnesium  Disposition: [facility] to [facility] as per [facility]    Plan of care and goals reviewed with multidisciplinary/antibiotic stewardship team during rounds.   I discussed the patient's findings and my recommendations with patient and nursing staff

## 2024-01-13 NOTE — PROGRESS NOTES
2 Days Post-Op day 2 right thoracotomy total complete pleural decortication       LOS: 4 days   Patient Care Team:  Provider, No Known as PCP - General    Chief complaint:    Subjective   Denies chest pain, denies shortness of breath    Objective    Vital Signs  Temp:  [98 °F (36.7 °C)-98.4 °F (36.9 °C)] 98.2 °F (36.8 °C)  Heart Rate:  [] 85  Resp:  [16-24] 24  BP: (116-139)/(65-90) 131/80    Physical Exam:   General Appearance: alert, appears stated age and cooperative   Lungs: clear bilaterally   Heart: Regular rate and rhythm   Skin:  Incision c/d/i     Results     Results from last 7 days   Lab Units 01/13/24  0711   WBC 10*3/mm3 13.21*   HEMOGLOBIN g/dL 11.5*   HEMATOCRIT % 35.0*   PLATELETS 10*3/mm3 300     Results from last 7 days   Lab Units 01/13/24  0711   SODIUM mmol/L 140   POTASSIUM mmol/L 3.3*   CHLORIDE mmol/L 106   CO2 mmol/L 25.0   BUN mg/dL 19   CREATININE mg/dL 0.53*   GLUCOSE mg/dL 87   CALCIUM mg/dL 8.0*               Assessment      Loculated pleural effusion    IVDU (intravenous drug user)    Pleural effusion  Nursing has found a vape device in the patient's bed  Second day postop right thoracotomy with total completion of pleural decortication  X-ray not read yet but looks like basilar atelectasis throughout the right lung plan   Continue chest tube until Monday per Dr. Vargas's note and request  Continue to follow CBC and BMPs  Continue chest tubes  Transfer to telemetry    Arpit Devi PA-C  01/13/24  08:46 EST

## 2024-01-13 NOTE — PLAN OF CARE
"Goal Outcome Evaluation:  Plan of Care Reviewed With: patient, spouse        Progress: no change  Outcome Evaluation:     -VS stable on 1L NC.  -60 ml serosanguineous output from chest tubes.   -Pain controlled with prn toradol, dilaudid, and roxicodone as ordered. Patient rates pain 5-6/10 at rest and 7-9/10 with activity.   -Good po intake and adequate uop.   -Despite frequent encouragement, patient refused to get up to the chair or walk this shift. Also found a vape in patient's bed this morning. Removed from patient and educated him on importance of not vaping. He states he did not use it and was \"just looking at it.\"                                "

## 2024-01-13 NOTE — PLAN OF CARE
Goal Outcome Evaluation:   VSS. UOP adequate. No BM. Up to chair. Refuses to walk and get up to chair multiple times. Pain meds given frequently. CX tube output 30 ml. Afebrile. Family at bedside.

## 2024-01-14 LAB
ANION GAP SERPL CALCULATED.3IONS-SCNC: 10 MMOL/L (ref 5–15)
BACTERIA SPEC AEROBE CULT: NORMAL
BACTERIA SPEC AEROBE CULT: NORMAL
BASOPHILS # BLD AUTO: 0.04 10*3/MM3 (ref 0–0.2)
BASOPHILS NFR BLD AUTO: 0.3 % (ref 0–1.5)
BUN SERPL-MCNC: 10 MG/DL (ref 6–20)
BUN/CREAT SERPL: 20 (ref 7–25)
CALCIUM SPEC-SCNC: 8.6 MG/DL (ref 8.6–10.5)
CHLORIDE SERPL-SCNC: 100 MMOL/L (ref 98–107)
CO2 SERPL-SCNC: 23 MMOL/L (ref 22–29)
CREAT SERPL-MCNC: 0.5 MG/DL (ref 0.76–1.27)
DEPRECATED RDW RBC AUTO: 40.6 FL (ref 37–54)
EGFRCR SERPLBLD CKD-EPI 2021: 138.1 ML/MIN/1.73
EOSINOPHIL # BLD AUTO: 0.11 10*3/MM3 (ref 0–0.4)
EOSINOPHIL NFR BLD AUTO: 0.8 % (ref 0.3–6.2)
ERYTHROCYTE [DISTWIDTH] IN BLOOD BY AUTOMATED COUNT: 13.3 % (ref 12.3–15.4)
GLUCOSE SERPL-MCNC: 102 MG/DL (ref 65–99)
GRAM STN SPEC: NORMAL
GRAM STN SPEC: NORMAL
HCT VFR BLD AUTO: 36 % (ref 37.5–51)
HGB BLD-MCNC: 12 G/DL (ref 13–17.7)
IMM GRANULOCYTES # BLD AUTO: 0.29 10*3/MM3 (ref 0–0.05)
IMM GRANULOCYTES NFR BLD AUTO: 2.2 % (ref 0–0.5)
LYMPHOCYTES # BLD AUTO: 2.47 10*3/MM3 (ref 0.7–3.1)
LYMPHOCYTES NFR BLD AUTO: 19 % (ref 19.6–45.3)
MAGNESIUM SERPL-MCNC: 1.9 MG/DL (ref 1.6–2.6)
MCH RBC QN AUTO: 28 PG (ref 26.6–33)
MCHC RBC AUTO-ENTMCNC: 33.3 G/DL (ref 31.5–35.7)
MCV RBC AUTO: 83.9 FL (ref 79–97)
MONOCYTES # BLD AUTO: 0.8 10*3/MM3 (ref 0.1–0.9)
MONOCYTES NFR BLD AUTO: 6.2 % (ref 5–12)
NEUTROPHILS NFR BLD AUTO: 71.5 % (ref 42.7–76)
NEUTROPHILS NFR BLD AUTO: 9.29 10*3/MM3 (ref 1.7–7)
NRBC BLD AUTO-RTO: 0 /100 WBC (ref 0–0.2)
PLATELET # BLD AUTO: 315 10*3/MM3 (ref 140–450)
PMV BLD AUTO: 9.2 FL (ref 6–12)
POTASSIUM SERPL-SCNC: 3.6 MMOL/L (ref 3.5–5.2)
POTASSIUM SERPL-SCNC: 3.7 MMOL/L (ref 3.5–5.2)
RBC # BLD AUTO: 4.29 10*6/MM3 (ref 4.14–5.8)
SODIUM SERPL-SCNC: 133 MMOL/L (ref 136–145)
WBC NRBC COR # BLD AUTO: 13 10*3/MM3 (ref 3.4–10.8)

## 2024-01-14 PROCEDURE — 84132 ASSAY OF SERUM POTASSIUM: CPT | Performed by: INTERNAL MEDICINE

## 2024-01-14 PROCEDURE — 85025 COMPLETE CBC W/AUTO DIFF WBC: CPT | Performed by: INTERNAL MEDICINE

## 2024-01-14 PROCEDURE — 25010000002 HYDROMORPHONE PER 4 MG: Performed by: PHYSICIAN ASSISTANT

## 2024-01-14 PROCEDURE — 25010000002 CEFTRIAXONE PER 250 MG: Performed by: INTERNAL MEDICINE

## 2024-01-14 PROCEDURE — 25810000003 SODIUM CHLORIDE 0.9 % SOLUTION: Performed by: INTERNAL MEDICINE

## 2024-01-14 PROCEDURE — 80048 BASIC METABOLIC PNL TOTAL CA: CPT | Performed by: INTERNAL MEDICINE

## 2024-01-14 PROCEDURE — 83735 ASSAY OF MAGNESIUM: CPT | Performed by: INTERNAL MEDICINE

## 2024-01-14 PROCEDURE — 25010000002 VANCOMYCIN 10 G RECONSTITUTED SOLUTION: Performed by: INTERNAL MEDICINE

## 2024-01-14 PROCEDURE — 99233 SBSQ HOSP IP/OBS HIGH 50: CPT | Performed by: INTERNAL MEDICINE

## 2024-01-14 PROCEDURE — 99024 POSTOP FOLLOW-UP VISIT: CPT | Performed by: PHYSICIAN ASSISTANT

## 2024-01-14 RX ORDER — HYDROXYZINE HYDROCHLORIDE 25 MG/1
25 TABLET, FILM COATED ORAL NIGHTLY PRN
Status: DISCONTINUED | OUTPATIENT
Start: 2024-01-14 | End: 2024-01-17 | Stop reason: HOSPADM

## 2024-01-14 RX ORDER — SCOLOPAMINE TRANSDERMAL SYSTEM 1 MG/1
1 PATCH, EXTENDED RELEASE TRANSDERMAL
Status: DISCONTINUED | OUTPATIENT
Start: 2024-01-14 | End: 2024-01-17 | Stop reason: HOSPADM

## 2024-01-14 RX ORDER — METHADONE HYDROCHLORIDE 10 MG/1
90 TABLET ORAL DAILY
Status: DISCONTINUED | OUTPATIENT
Start: 2024-01-14 | End: 2024-01-17 | Stop reason: HOSPADM

## 2024-01-14 RX ORDER — POTASSIUM CHLORIDE 20 MEQ/1
40 TABLET, EXTENDED RELEASE ORAL EVERY 4 HOURS
Qty: 4 TABLET | Refills: 0 | Status: DISPENSED | OUTPATIENT
Start: 2024-01-14 | End: 2024-01-14

## 2024-01-14 RX ORDER — VANCOMYCIN/0.9 % SOD CHLORIDE 1.5G/250ML
1500 PLASTIC BAG, INJECTION (ML) INTRAVENOUS EVERY 8 HOURS
Status: DISCONTINUED | OUTPATIENT
Start: 2024-01-14 | End: 2024-01-17 | Stop reason: HOSPADM

## 2024-01-14 RX ADMIN — HYDROMORPHONE HYDROCHLORIDE 0.5 MG: 1 INJECTION, SOLUTION INTRAMUSCULAR; INTRAVENOUS; SUBCUTANEOUS at 16:50

## 2024-01-14 RX ADMIN — HYDROMORPHONE HYDROCHLORIDE 0.5 MG: 1 INJECTION, SOLUTION INTRAMUSCULAR; INTRAVENOUS; SUBCUTANEOUS at 00:39

## 2024-01-14 RX ADMIN — OXYCODONE HYDROCHLORIDE 7.5 MG: 15 TABLET ORAL at 19:32

## 2024-01-14 RX ADMIN — METHADONE HYDROCHLORIDE 90 MG: 10 TABLET ORAL at 11:38

## 2024-01-14 RX ADMIN — VANCOMYCIN HYDROCHLORIDE 1500 MG: 10 INJECTION, POWDER, LYOPHILIZED, FOR SOLUTION INTRAVENOUS at 12:32

## 2024-01-14 RX ADMIN — METRONIDAZOLE 500 MG: 500 TABLET ORAL at 07:02

## 2024-01-14 RX ADMIN — METRONIDAZOLE 500 MG: 500 TABLET ORAL at 20:41

## 2024-01-14 RX ADMIN — SCOPOLAMINE 1 PATCH: 1.5 PATCH, EXTENDED RELEASE TRANSDERMAL at 11:39

## 2024-01-14 RX ADMIN — CEFTRIAXONE 2000 MG: 2 INJECTION, POWDER, FOR SOLUTION INTRAMUSCULAR; INTRAVENOUS at 16:50

## 2024-01-14 RX ADMIN — VANCOMYCIN HYDROCHLORIDE 1500 MG: 10 INJECTION, POWDER, LYOPHILIZED, FOR SOLUTION INTRAVENOUS at 19:34

## 2024-01-14 RX ADMIN — HYDROMORPHONE HYDROCHLORIDE 0.5 MG: 1 INJECTION, SOLUTION INTRAMUSCULAR; INTRAVENOUS; SUBCUTANEOUS at 09:31

## 2024-01-14 RX ADMIN — POTASSIUM CHLORIDE 40 MEQ: 1500 TABLET, EXTENDED RELEASE ORAL at 09:31

## 2024-01-14 RX ADMIN — Medication 10 ML: at 20:43

## 2024-01-14 RX ADMIN — Medication 10 ML: at 09:31

## 2024-01-14 RX ADMIN — GABAPENTIN 100 MG: 100 CAPSULE ORAL at 16:51

## 2024-01-14 RX ADMIN — ACETAMINOPHEN 1000 MG: 500 TABLET, FILM COATED ORAL at 16:51

## 2024-01-14 RX ADMIN — SENNOSIDES AND DOCUSATE SODIUM 2 TABLET: 8.6; 5 TABLET ORAL at 09:30

## 2024-01-14 RX ADMIN — GABAPENTIN 100 MG: 100 CAPSULE ORAL at 09:31

## 2024-01-14 RX ADMIN — HYDROMORPHONE HYDROCHLORIDE 0.5 MG: 1 INJECTION, SOLUTION INTRAMUSCULAR; INTRAVENOUS; SUBCUTANEOUS at 11:39

## 2024-01-14 RX ADMIN — METRONIDAZOLE 500 MG: 500 TABLET ORAL at 16:50

## 2024-01-14 RX ADMIN — HYDROXYZINE HYDROCHLORIDE 25 MG: 25 TABLET, FILM COATED ORAL at 21:16

## 2024-01-14 RX ADMIN — POLYETHYLENE GLYCOL 3350 17 G: 17 POWDER, FOR SOLUTION ORAL at 09:31

## 2024-01-14 RX ADMIN — GABAPENTIN 100 MG: 100 CAPSULE ORAL at 20:41

## 2024-01-14 RX ADMIN — VANCOMYCIN HYDROCHLORIDE 1500 MG: 10 INJECTION, POWDER, LYOPHILIZED, FOR SOLUTION INTRAVENOUS at 00:39

## 2024-01-14 RX ADMIN — HYDROMORPHONE HYDROCHLORIDE 0.5 MG: 1 INJECTION, SOLUTION INTRAMUSCULAR; INTRAVENOUS; SUBCUTANEOUS at 20:41

## 2024-01-14 RX ADMIN — PANTOPRAZOLE SODIUM 40 MG: 40 TABLET, DELAYED RELEASE ORAL at 07:02

## 2024-01-14 RX ADMIN — HYDROMORPHONE HYDROCHLORIDE 0.5 MG: 1 INJECTION, SOLUTION INTRAMUSCULAR; INTRAVENOUS; SUBCUTANEOUS at 07:02

## 2024-01-14 NOTE — PROGRESS NOTES
3 Days Post-Op day 2 right thoracotomy total complete pleural decortication       LOS: 5 days   Patient Care Team:  Provider, No Known as PCP - General    Chief complaint:    Subjective   Denies chest pain, denies shortness of breath    Objective    Vital Signs  Temp:  [98 °F (36.7 °C)-98.7 °F (37.1 °C)] 98.7 °F (37.1 °C)  Heart Rate:  [60-87] 85  Resp:  [18-22] 22  BP: (134-151)/(78-87) 140/83    Physical Exam:   General Appearance: alert, appears stated age and cooperative   Lungs: clear bilaterally   Heart: Regular rate and rhythm   Skin:  Incision c/d/i     Results     Results from last 7 days   Lab Units 01/14/24  0700   WBC 10*3/mm3 13.00*   HEMOGLOBIN g/dL 12.0*   HEMATOCRIT % 36.0*   PLATELETS 10*3/mm3 315     Results from last 7 days   Lab Units 01/14/24  0700   SODIUM mmol/L 133*   POTASSIUM mmol/L 3.6   CHLORIDE mmol/L 100   CO2 mmol/L 23.0   BUN mg/dL 10   CREATININE mg/dL 0.50*   GLUCOSE mg/dL 102*   CALCIUM mg/dL 8.6               Assessment      Loculated pleural effusion    IVDU (intravenous drug user)    Pleural effusion  Nursing has found a vape device in the patient's bed  Postop day 3 right thoracotomy with total completion of pleural decortication  Continue to mobilization  Continue chest tube until Monday per Dr. Vargas's note and request  Continue to follow CBC and BMPs        Arpit Devi PA-C  01/14/24  11:05 EST

## 2024-01-14 NOTE — PROGRESS NOTES
[facility] Medicine Services  PROGRESS NOTE    Patient Name: Lion Solis  : 1990  MRN: [id number]    Date of Admission: 2024  Primary Care Physician: Provider, No Known    Subjective   Subjective     CC:  Parapneumonic effusion    HPI:  Patient is doing well this morning.  Having some diarrhea and nausea.  Has not been getting his methadone.  Does have pain is around the right lung.      Objective   Objective     Vital Signs:   Temp:  [98 °F (36.7 °C)-98.7 °F (37.1 °C)] 98.7 °F (37.1 °C)  Heart Rate:  [60-87] 85  Resp:  [18-22] 22  BP: (121-151)/(72-87) 140/83  Flow (L/min):  [1] 1     Physical Exam:  Constitutional: No acute distress, awake, alert  Respiratory: Clear to auscultation bilaterally, respiratory effort normal on room air. Right Ct in place with serosanguinous drainage  Cardiovascular: RRR,  Gastrointestinal: Positive bowel sounds, soft, nontender, nondistended  Musculoskeletal: No bilateral ankle edema  Psychiatric: Appropriate affect, cooperative  Neurologic: Oriented x 3, no focal deficits  Skin: No rashes      Results Reviewed:  LAB RESULTS:      Lab 24  0700 24  0711 24  0602 24  0213 01/10/24  0659 24  0509 24  1038 24  0838 24  0828 24  0814   WBC 13.00* 13.21* 16.58* 14.61* 17.71* 18.66*  --   --   --  17.97*   HEMOGLOBIN 12.0* 11.5* 11.7* 11.7* 11.8* 13.1  --   --   --  13.1   HEMATOCRIT 36.0* 35.0* 35.4* 35.0* 35.4* 38.7  --   --   --  39.4   PLATELETS 315 300 279 212 218 232  --   --   --  278   NEUTROS ABS 9.29* 9.44* 14.16* 10.88*  --  14.63*  --   --   --  14.38*   IMMATURE GRANS (ABS) 0.29* 0.23* 0.14* 0.08*  --  0.07*  --   --   --  0.09*   LYMPHS ABS 2.47 2.68 1.38 2.38  --  2.75  --   --   --  2.35   MONOS ABS 0.80 0.71 0.87 0.95*  --  1.08*  --   --   --  0.93*   EOS ABS 0.11 0.12 0.00 0.28  --  0.08  --   --   --  0.17   MCV 83.9 85.2 83.7 85.8 84.5 84.1  --   --   --  84.9   CRP  --   --    --  28.31*  --   --   --   --   --   --    PROCALCITONIN  --   --   --  0.37*  --   --   --  0.21  --   --    LACTATE  --   --   --   --   --   --   --   --  1.2  --    LDH  --   --   --   --   --   --  240*  --   --   --    PROTIME  --   --   --   --  15.4* 15.3*  --   --   --   --    D DIMER QUANT  --   --   --   --   --   --   --   --   --  1.62*         Lab 01/14/24  0700 01/13/24 2034 01/13/24  0711 01/12/24  0602 01/11/24  0213 01/10/24  0659   SODIUM 133*  --  140 139 136 136   POTASSIUM 3.6 3.4* 3.3* 4.0 3.7 3.7   CHLORIDE 100  --  106 106 103 100   CO2 23.0  --  25.0 25.0 23.0 25.0   ANION GAP 10.0  --  9.0 8.0 10.0 11.0   BUN 10  --  19 13 12 12   CREATININE 0.50*  --  0.53* 0.46* 0.56* 0.58*   EGFR 138.1  --  135.7 141.6 133.5 132.1   GLUCOSE 102*  --  87 145* 85 101*   CALCIUM 8.6  --  8.0* 8.6 8.3* 8.6   MAGNESIUM 1.9  --  2.1  --   --   --          Lab 01/09/24  0509 01/08/24  0838   TOTAL PROTEIN 7.6 7.9   ALBUMIN 3.5 3.9   GLOBULIN 4.1 4.0   ALT (SGPT) 52* 76*   AST (SGOT) 22 31   BILIRUBIN 1.0 0.6   ALK PHOS 117 89   LIPASE  --  14         Lab 01/10/24  0659 01/09/24  0509 01/08/24  1038 01/08/24  0838   PROBNP  --   --   --  78.6   HSTROP T  --   --  <6 <6   PROTIME 15.4* 15.3*  --   --    INR 1.21* 1.19*  --   --              Lab 01/10/24  0659   ABO TYPING A   RH TYPING Positive   ANTIBODY SCREEN Negative         Brief Urine Lab Results       None            Microbiology Results Abnormal       Procedure Component Value - Date/Time    Blood Culture - Blood, Hand, Left [[id number]]  (Normal) Collected: 01/09/24 0525    Lab Status: Final result Specimen: Blood from Hand, Left Updated: 01/14/24 0715     Blood Culture No growth at 5 days    Tissue / Bone Culture - Tissue, Pleural Cavity [[id number]] Collected: 01/11/24 1142    Lab Status: Final result Specimen: Tissue from Pleural Cavity Updated: 01/14/24 0655     Tissue Culture No growth at 3 days     Gram Stain Few (2+) WBCs seen      No organisms  seen    AFB Culture - Body Fluid, Pleural Cavity [[id number]] Collected: 01/08/24 1618    Lab Status: Preliminary result Specimen: Body Fluid from Pleural Cavity Updated: 01/13/24 1716     AFB Culture No AFB isolated at less than 1 week     AFB Stain No acid fast bacilli seen on concentrated smear    Fungus Culture - Body Fluid, Pleural Cavity [[id number]] Collected: 01/08/24 1618    Lab Status: Preliminary result Specimen: Body Fluid from Pleural Cavity Updated: 01/13/24 1716     Fungus Culture No fungus isolated at less than 1 week    Blood Culture - Blood, Arm, Right [[id number]]  (Normal) Collected: 01/08/24 1434    Lab Status: Final result Specimen: Blood from Arm, Right Updated: 01/13/24 1501     Blood Culture No growth at 5 days    Narrative:      Aerobic Bottle Only    Less than seven (7) mL's of blood was collected.  Insufficient quantity may yield false negative results.    Anaerobic Culture - Pleural Fluid, Pleural Cavity [[id number]]  (Normal) Collected: 01/08/24 1618    Lab Status: Final result Specimen: Pleural Fluid from Pleural Cavity Updated: 01/13/24 0952     Anaerobic Culture No anaerobes isolated at 5 days    Blood Culture - Blood, Arm, Left [[id number]]  (Normal) Collected: 01/08/24 0828    Lab Status: Final result Specimen: Blood from Arm, Left Updated: 01/13/24 0846     Blood Culture No growth at 5 days    Narrative:      Less than seven (7) mL's of blood was collected.  Insufficient quantity may yield false negative results.    AFB Culture - Tissue, Pleural Cavity [[id number]] Collected: 01/11/24 1142    Lab Status: Preliminary result Specimen: Tissue from Pleural Cavity Updated: 01/12/24 1351     AFB Stain No acid fast bacilli seen on concentrated smear    Body Fluid Culture - Body Fluid, Pleural Cavity [[id number]] Collected: 01/08/24 1618    Lab Status: Final result Specimen: Body Fluid from Pleural Cavity Updated: 01/11/24 0817     Body Fluid Culture No growth at 3 days     Gram Stain Few (2+)  WBCs seen      Few (2+) Red blood cells      No organisms seen    Fungus Smear - Body Fluid, Pleural Cavity [[id number]] Collected: 01/08/24 1618    Lab Status: Final result Specimen: Body Fluid from Pleural Cavity Updated: 01/09/24 1335     Fungal Stain No fungal elements seen    COVID PRE-OP / PRE-PROCEDURE SCREENING ORDER (NO ISOLATION) - Swab, Nasopharynx [[id number]]  (Normal) Collected: 01/08/24 0805    Lab Status: Final result Specimen: Swab from Nasopharynx Updated: 01/08/24 0915    Narrative:      The following orders were created for panel order COVID PRE-OP / PRE-PROCEDURE SCREENING ORDER (NO ISOLATION) - Swab, Nasopharynx.  Procedure                               Abnormality         Status                     ---------                               -----------         ------                     Respiratory Panel PCR w/...[[id number]]  Normal              Final result                 Please view results for these tests on the individual orders.    Respiratory Panel PCR w/COVID-19(SARS-CoV-2) VERONICA/MARCELO/ISIDRO/PAD/COR/JOHN In-House, NP Swab in UTM/VTM, 2 HR TAT - Swab, Nasopharynx [[id number]]  (Normal) Collected: 01/08/24 0805    Lab Status: Final result Specimen: Swab from Nasopharynx Updated: 01/08/24 0915     ADENOVIRUS, PCR Not Detected     Coronavirus 229E Not Detected     Coronavirus HKU1 Not Detected     Coronavirus NL63 Not Detected     Coronavirus OC43 Not Detected     COVID19 Not Detected     Human Metapneumovirus Not Detected     Human Rhinovirus/Enterovirus Not Detected     Influenza A PCR Not Detected     Influenza B PCR Not Detected     Parainfluenza Virus 1 Not Detected     Parainfluenza Virus 2 Not Detected     Parainfluenza Virus 3 Not Detected     Parainfluenza Virus 4 Not Detected     RSV, PCR Not Detected     Bordetella pertussis pcr Not Detected     Bordetella parapertussis PCR Not Detected     Chlamydophila pneumoniae PCR Not Detected     Mycoplasma pneumo by PCR Not Detected    Narrative:       In the setting of a positive respiratory panel with a viral infection PLUS a negative procalcitonin without other underlying concern for bacterial infection, consider observing off antibiotics or discontinuation of antibiotics and continue supportive care. If the respiratory panel is positive for atypical bacterial infection (Bordetella pertussis, Chlamydophila pneumoniae, or Mycoplasma pneumoniae), consider antibiotic de-escalation to target atypical bacterial infection.            XR Chest 1 View    Result Date: 1/13/2024  XR CHEST 1 VW Date of Exam: 1/13/2024 2:25 AM EST Indication: PNEUMOTHORAX Postop Comparison:  1/12/2024 Findings: 2 right-sided chest tubes. Cardiac size is similar to prior exam. Mildly increased linear atelectasis in the right basilar lung. Small right layering pleural effusion. No substantial pneumothorax. No evidence of acute osseous abnormalities. Visualized upper abdomen is unremarkable.     Impression: Impression: Mildly increased linear atelectasis in the right basal lung. Small right layering pleural effusion. No substantial pneumothorax seen. Electronically Signed: Phoenix Cook MD  1/13/2024 9:25 AM EST  Workstation ID: [id number]     Results for orders placed during the hospital encounter of 01/08/24    Adult Transthoracic Echo Complete w/ Color, Spectral and Contrast if Necessary Per Protocol    Interpretation Summary    Left ventricular systolic function is normal. Calculated left ventricular EF = 57.8% Left ventricular ejection fraction appears to be 56 - 60%.    Estimated right ventricular systolic pressure from tricuspid regurgitation is normal (<35 mmHg).    Mild MR      Current medications:  Scheduled Meds:acetaminophen, 1,000 mg, Oral, Q8H  cefTRIAXone, 2,000 mg, Intravenous, Q24H  [Held by provider] enoxaparin, 40 mg, Subcutaneous, Q24H  gabapentin, 100 mg, Oral, TID  metroNIDAZOLE, 500 mg, Oral, Q8H  pantoprazole, 40 mg, Oral, Q AM  polyethylene glycol, 17 g, Oral,  Daily  potassium chloride ER, 40 mEq, Oral, Q4H  senna-docusate sodium, 2 tablet, Oral, BID  sodium chloride, 10 mL, Intravenous, Q12H      Continuous Infusions:niCARdipine, 5-15 mg/hr, Last Rate: Stopped (01/11/24 1500)  Pharmacy to dose vancomycin,       PRN Meds:.  acetaminophen **OR** acetaminophen **OR** acetaminophen    bisacodyl    bisacodyl    Calcium Replacement - Follow Nurse / BPA Driven Protocol    HYDROmorphone    Magnesium Standard Dose Replacement - Follow Nurse / BPA Driven Protocol    melatonin    nicotine    nitroglycerin    ondansetron ODT **OR** ondansetron    oxyCODONE    Pharmacy to dose vancomycin    Phosphorus Replacement - Follow Nurse / BPA Driven Protocol    polyethylene glycol    Potassium Replacement - Follow Nurse / BPA Driven Protocol    sodium chloride    sodium chloride    Assessment & Plan   Assessment & Plan     Active Hospital Problems    Diagnosis  POA    **Loculated pleural effusion [J90]  Yes    Pleural effusion [J90]  Yes    IVDU (intravenous drug user) [F19.90]  Yes      Resolved Hospital Problems   No resolved problems to display.        Brief Hospital Course to date:  Lion Solis is a 33 y.o. male with history of IV drug use on chronic methadone, vaping presented with right-sided chest pain and shortness of breath.  Patient was recently diagnosed with an upper respiratory infection and seen at an urgent treatment center 5 days prior to admission.  At that time the chest pain was felt to be related to a muscle strain and he was given steroids and naproxen.  He then became febrile and presented to the ED D.  In the ED had an elevated white blood cell count.  CT of the chest showed a loculated right pleural effusion with compressive atelectasis of the right middle lobe and right lower lobe.  Thoracentesis was performed and patient had a exudative effusion.  Gram stain was negative.  Blood cultures grew coag negative staph which is thought to be a contaminant.  HIV was  negative.  Infectious disease and CT surgery were consulted.  Patient was taken to the OR on 1/11 by Dr. Vargas and underwent right-sided decortication and pleural fluid drainage.  Post procedurally patient was admitted to the ICU.  Transfer to telemetry on 1/14/2024.    Loculated right pleural effusion  -Likely complicated parapneumonic effusion  -Currently on room air. White blood cell count trending down  -Underwent right decortication by Dr. Vargas on 1/11/2023 with cultures.  -Cultures have remained negative.  -Pain control challenging given history of high-dose methadone.  Continue oxycodone, IV dilaudid.   -Chest tube management per CT surgery  -ID following.  Continue vancomycin, Rocephin and Flagyl    History of IV drug use  -Resume home methadone, patient appears to have some mild withdrawal symptoms    Hyponatremia  -Sodium trended down to 133 this morning, not clinically significant.  Will monitor      Expected Discharge Location and Transportation: home  Expected Discharge   Expected Discharge Date: 1/16/2024; Expected Discharge Time:      DVT prophylaxis:  Medical and mechanical DVT prophylaxis orders are present.     AM-PAC 6 Clicks Score (PT): 12 (01/12/24 2046)    CODE STATUS:   Code Status and Medical Interventions:   Ordered at: 01/11/24 1253     Code Status (Patient has no pulse and is not breathing):    CPR (Attempt to Resuscitate)     Medical Interventions (Patient has pulse or is breathing):    Full Support     Today is my first day evaluating this patient's active medical problems. I Personally reviewed chart and adjusted note to reflect daily changes in management/clinical condition. Copied text in this note has been reviewed and is accurate as of  01/14/24      Temitope Barba MD  01/14/24

## 2024-01-15 ENCOUNTER — APPOINTMENT (OUTPATIENT)
Dept: GENERAL RADIOLOGY | Facility: HOSPITAL | Age: 34
End: 2024-01-15
Payer: COMMERCIAL

## 2024-01-15 LAB
ANION GAP SERPL CALCULATED.3IONS-SCNC: 12 MMOL/L (ref 5–15)
BASOPHILS # BLD AUTO: 0.09 10*3/MM3 (ref 0–0.2)
BASOPHILS NFR BLD AUTO: 0.7 % (ref 0–1.5)
BUN SERPL-MCNC: 9 MG/DL (ref 6–20)
BUN/CREAT SERPL: 17.6 (ref 7–25)
CALCIUM SPEC-SCNC: 8.2 MG/DL (ref 8.6–10.5)
CHLORIDE SERPL-SCNC: 105 MMOL/L (ref 98–107)
CO2 SERPL-SCNC: 22 MMOL/L (ref 22–29)
CREAT SERPL-MCNC: 0.51 MG/DL (ref 0.76–1.27)
DEPRECATED RDW RBC AUTO: 39.4 FL (ref 37–54)
EGFRCR SERPLBLD CKD-EPI 2021: 137.3 ML/MIN/1.73
EOSINOPHIL # BLD AUTO: 0.23 10*3/MM3 (ref 0–0.4)
EOSINOPHIL NFR BLD AUTO: 1.7 % (ref 0.3–6.2)
ERYTHROCYTE [DISTWIDTH] IN BLOOD BY AUTOMATED COUNT: 13.2 % (ref 12.3–15.4)
GLUCOSE SERPL-MCNC: 103 MG/DL (ref 65–99)
HCT VFR BLD AUTO: 37.2 % (ref 37.5–51)
HGB BLD-MCNC: 12.4 G/DL (ref 13–17.7)
IMM GRANULOCYTES # BLD AUTO: 0.55 10*3/MM3 (ref 0–0.05)
IMM GRANULOCYTES NFR BLD AUTO: 4.2 % (ref 0–0.5)
LYMPHOCYTES # BLD AUTO: 2.35 10*3/MM3 (ref 0.7–3.1)
LYMPHOCYTES NFR BLD AUTO: 17.7 % (ref 19.6–45.3)
MCH RBC QN AUTO: 27.7 PG (ref 26.6–33)
MCHC RBC AUTO-ENTMCNC: 33.3 G/DL (ref 31.5–35.7)
MCV RBC AUTO: 83.2 FL (ref 79–97)
MONOCYTES # BLD AUTO: 0.81 10*3/MM3 (ref 0.1–0.9)
MONOCYTES NFR BLD AUTO: 6.1 % (ref 5–12)
NEUTROPHILS NFR BLD AUTO: 69.6 % (ref 42.7–76)
NEUTROPHILS NFR BLD AUTO: 9.22 10*3/MM3 (ref 1.7–7)
NRBC BLD AUTO-RTO: 0 /100 WBC (ref 0–0.2)
PLATELET # BLD AUTO: 294 10*3/MM3 (ref 140–450)
PMV BLD AUTO: 9.1 FL (ref 6–12)
POTASSIUM SERPL-SCNC: 3.9 MMOL/L (ref 3.5–5.2)
RBC # BLD AUTO: 4.47 10*6/MM3 (ref 4.14–5.8)
SODIUM SERPL-SCNC: 139 MMOL/L (ref 136–145)
WBC NRBC COR # BLD AUTO: 13.25 10*3/MM3 (ref 3.4–10.8)

## 2024-01-15 PROCEDURE — 99024 POSTOP FOLLOW-UP VISIT: CPT | Performed by: THORACIC SURGERY (CARDIOTHORACIC VASCULAR SURGERY)

## 2024-01-15 PROCEDURE — 99232 SBSQ HOSP IP/OBS MODERATE 35: CPT | Performed by: INTERNAL MEDICINE

## 2024-01-15 PROCEDURE — 25810000003 SODIUM CHLORIDE 0.9 % SOLUTION: Performed by: INTERNAL MEDICINE

## 2024-01-15 PROCEDURE — 80048 BASIC METABOLIC PNL TOTAL CA: CPT | Performed by: INTERNAL MEDICINE

## 2024-01-15 PROCEDURE — 71045 X-RAY EXAM CHEST 1 VIEW: CPT

## 2024-01-15 PROCEDURE — 25010000002 CEFTRIAXONE PER 250 MG: Performed by: INTERNAL MEDICINE

## 2024-01-15 PROCEDURE — 25010000002 VANCOMYCIN 10 G RECONSTITUTED SOLUTION: Performed by: INTERNAL MEDICINE

## 2024-01-15 PROCEDURE — 25010000002 FUROSEMIDE PER 20 MG

## 2024-01-15 PROCEDURE — 80202 ASSAY OF VANCOMYCIN: CPT

## 2024-01-15 PROCEDURE — 85025 COMPLETE CBC W/AUTO DIFF WBC: CPT | Performed by: INTERNAL MEDICINE

## 2024-01-15 PROCEDURE — 25010000002 HYDROMORPHONE PER 4 MG: Performed by: PHYSICIAN ASSISTANT

## 2024-01-15 RX ORDER — FUROSEMIDE 10 MG/ML
40 INJECTION INTRAMUSCULAR; INTRAVENOUS ONCE
Status: COMPLETED | OUTPATIENT
Start: 2024-01-15 | End: 2024-01-15

## 2024-01-15 RX ADMIN — PANTOPRAZOLE SODIUM 40 MG: 40 TABLET, DELAYED RELEASE ORAL at 06:06

## 2024-01-15 RX ADMIN — OXYCODONE HYDROCHLORIDE 7.5 MG: 15 TABLET ORAL at 15:50

## 2024-01-15 RX ADMIN — HYDROMORPHONE HYDROCHLORIDE 0.5 MG: 1 INJECTION, SOLUTION INTRAMUSCULAR; INTRAVENOUS; SUBCUTANEOUS at 04:35

## 2024-01-15 RX ADMIN — FUROSEMIDE 40 MG: 10 INJECTION, SOLUTION INTRAMUSCULAR; INTRAVENOUS at 09:00

## 2024-01-15 RX ADMIN — OXYCODONE HYDROCHLORIDE 7.5 MG: 15 TABLET ORAL at 11:37

## 2024-01-15 RX ADMIN — VANCOMYCIN HYDROCHLORIDE 1500 MG: 10 INJECTION, POWDER, LYOPHILIZED, FOR SOLUTION INTRAVENOUS at 03:21

## 2024-01-15 RX ADMIN — GABAPENTIN 100 MG: 100 CAPSULE ORAL at 16:57

## 2024-01-15 RX ADMIN — HYDROXYZINE HYDROCHLORIDE 25 MG: 25 TABLET, FILM COATED ORAL at 20:22

## 2024-01-15 RX ADMIN — OXYCODONE HYDROCHLORIDE 7.5 MG: 15 TABLET ORAL at 19:51

## 2024-01-15 RX ADMIN — Medication 10 ML: at 09:43

## 2024-01-15 RX ADMIN — ACETAMINOPHEN 1000 MG: 500 TABLET, FILM COATED ORAL at 13:46

## 2024-01-15 RX ADMIN — CEFTRIAXONE 2000 MG: 2 INJECTION, POWDER, FOR SOLUTION INTRAMUSCULAR; INTRAVENOUS at 16:57

## 2024-01-15 RX ADMIN — GABAPENTIN 100 MG: 100 CAPSULE ORAL at 20:18

## 2024-01-15 RX ADMIN — METHADONE HYDROCHLORIDE 90 MG: 10 TABLET ORAL at 09:41

## 2024-01-15 RX ADMIN — METRONIDAZOLE 500 MG: 500 TABLET ORAL at 20:18

## 2024-01-15 RX ADMIN — VANCOMYCIN HYDROCHLORIDE 1500 MG: 10 INJECTION, POWDER, LYOPHILIZED, FOR SOLUTION INTRAVENOUS at 19:34

## 2024-01-15 RX ADMIN — ACETAMINOPHEN 1000 MG: 500 TABLET, FILM COATED ORAL at 20:19

## 2024-01-15 RX ADMIN — METRONIDAZOLE 500 MG: 500 TABLET ORAL at 13:46

## 2024-01-15 RX ADMIN — HYDROMORPHONE HYDROCHLORIDE 0.5 MG: 1 INJECTION, SOLUTION INTRAMUSCULAR; INTRAVENOUS; SUBCUTANEOUS at 08:57

## 2024-01-15 RX ADMIN — GABAPENTIN 100 MG: 100 CAPSULE ORAL at 08:56

## 2024-01-15 RX ADMIN — Medication 10 ML: at 20:20

## 2024-01-15 RX ADMIN — VANCOMYCIN HYDROCHLORIDE 1500 MG: 10 INJECTION, POWDER, LYOPHILIZED, FOR SOLUTION INTRAVENOUS at 11:37

## 2024-01-15 RX ADMIN — METRONIDAZOLE 500 MG: 500 TABLET ORAL at 06:06

## 2024-01-15 NOTE — PROGRESS NOTES
CTS Progress Note       LOS: 6 days   Patient Care Team:  Provider, No Known as PCP - General    Chief Complaint: Loculated pleural effusion    Vital Signs:  Temp:  [97.9 °F (36.6 °C)-98.9 °F (37.2 °C)] 97.9 °F (36.6 °C)  Heart Rate:  [61-85] 70  Resp:  [16-22] 16  BP: (118-140)/(74-86) 118/74    Physical Exam:       Results:     Results from last 7 days   Lab Units 01/15/24  0426   WBC 10*3/mm3 13.25*   HEMOGLOBIN g/dL 12.4*   HEMATOCRIT % 37.2*   PLATELETS 10*3/mm3 294     Results from last 7 days   Lab Units 01/15/24  0426   SODIUM mmol/L 139   POTASSIUM mmol/L 3.9   CHLORIDE mmol/L 105   CO2 mmol/L 22.0   BUN mg/dL 9   CREATININE mg/dL 0.51*   GLUCOSE mg/dL 103*   CALCIUM mg/dL 8.2*           Imaging Results (Last 24 Hours)       ** No results found for the last 24 hours. **            Assessment      Loculated pleural effusion    IVDU (intravenous drug user)    Pleural effusion        Plan   Lasix 40 mg IV x 1  Discontinue one of the 2 chest tubes  BMP and chest x-ray in the a.m.    Please note that portions of this note were completed with a voice recognition program. Efforts were made to edit the dictations, but occasionally words are mistranscribed.    Douglas Vargas MD  01/15/24  07:11 EST

## 2024-01-15 NOTE — CASE MANAGEMENT/SOCIAL WORK
Continued Stay Note   [place]     Patient Name: Lion Solis  MRN: [id number]  Today's Date: 1/15/2024    Admit Date: 1/8/2024    Plan: Ongoing   Discharge Plan       Row Name 01/15/24 0929       Plan    Plan Ongoing    Patient/Family in Agreement with Plan yes    Plan Comments Spoke with patient and mother at bedside. Plan is ongoing pending IV antibiotic plan. CM will continue to follow.    Final Discharge Disposition Code 30 - still a patient                   Discharge Codes    No documentation.                 Expected Discharge Date and Time       Expected Discharge Date Expected Discharge Time    Jan 16, 2024               Arpit Escoto RN

## 2024-01-15 NOTE — PLAN OF CARE
Goal Outcome Evaluation:   Pt is in NSR on monitor, vitals stable on room air. Pain controlled with Po Oxycodone. 1 chest tube removed, 1 still in place. Dressing changed and output recorded.

## 2024-01-15 NOTE — PROGRESS NOTES
[facility] Medicine Services  PROGRESS NOTE    Patient Name: Lion Solis  : 1990  MRN: [id number]    Date of Admission: 2024  Primary Care Physician: Provider, No Known    Subjective   Subjective     CC:  Chest tube    HPI:  Still having significant pain but better on methadone.  Diarrhea and nausea have improved.  Currently getting chest tube out per CT surgery.      Objective   Objective     Vital Signs:   Temp:  [97.9 °F (36.6 °C)-98.9 °F (37.2 °C)] 97.9 °F (36.6 °C)  Heart Rate:  [61-85] 70  Resp:  [16-22] 16  BP: (118-140)/(74-86) 118/74     Physical Exam:  Constitutional: No acute distress, awake, alert, on left side getting chest tube out  Respiratory: Respiratory effort normal on room air l   Cardiovascular: RRR on telemetry  Musculoskeletal: No bilateral ankle edema  Psychiatric: Appropriate affect, cooperative  Neurologic: Oriented x 3, able to roll over on left side without difficulty  Skin: No rashes      Results Reviewed:  LAB RESULTS:      Lab 01/15/24  0426 24  0700 24  0711 24  0602 24  0213 01/10/24  0659 24  0509 24  1038 24  0838 24  0828 24  0814   WBC 13.25* 13.00* 13.21* 16.58* 14.61* 17.71* 18.66*  --   --   --  17.97*   HEMOGLOBIN 12.4* 12.0* 11.5* 11.7* 11.7* 11.8* 13.1  --   --   --  13.1   HEMATOCRIT 37.2* 36.0* 35.0* 35.4* 35.0* 35.4* 38.7  --   --   --  39.4   PLATELETS 294 315 300 279 212 218 232  --   --   --  278   NEUTROS ABS 9.22* 9.29* 9.44* 14.16* 10.88*  --  14.63*  --   --   --  14.38*   IMMATURE GRANS (ABS) 0.55* 0.29* 0.23* 0.14* 0.08*  --  0.07*  --   --   --  0.09*   LYMPHS ABS 2.35 2.47 2.68 1.38 2.38  --  2.75  --   --   --  2.35   MONOS ABS 0.81 0.80 0.71 0.87 0.95*  --  1.08*  --   --   --  0.93*   EOS ABS 0.23 0.11 0.12 0.00 0.28  --  0.08  --   --   --  0.17   MCV 83.2 83.9 85.2 83.7 85.8 84.5 84.1  --   --   --  84.9   CRP  --   --   --   --  28.31*  --   --   --   --   --    --    PROCALCITONIN  --   --   --   --  0.37*  --   --   --  0.21  --   --    LACTATE  --   --   --   --   --   --   --   --   --  1.2  --    LDH  --   --   --   --   --   --   --  240*  --   --   --    PROTIME  --   --   --   --   --  15.4* 15.3*  --   --   --   --    D DIMER QUANT  --   --   --   --   --   --   --   --   --   --  1.62*         Lab 01/15/24  0426 01/14/24  1613 01/14/24  0700 01/13/24  2034 01/13/24  0711 01/12/24  0602 01/11/24  0213   SODIUM 139  --  133*  --  140 139 136   POTASSIUM 3.9 3.7 3.6 3.4* 3.3* 4.0 3.7   CHLORIDE 105  --  100  --  106 106 103   CO2 22.0  --  23.0  --  25.0 25.0 23.0   ANION GAP 12.0  --  10.0  --  9.0 8.0 10.0   BUN 9  --  10  --  19 13 12   CREATININE 0.51*  --  0.50*  --  0.53* 0.46* 0.56*   EGFR 137.3  --  138.1  --  135.7 141.6 133.5   GLUCOSE 103*  --  102*  --  87 145* 85   CALCIUM 8.2*  --  8.6  --  8.0* 8.6 8.3*   MAGNESIUM  --   --  1.9  --  2.1  --   --          Lab 01/09/24  0509 01/08/24  0838   TOTAL PROTEIN 7.6 7.9   ALBUMIN 3.5 3.9   GLOBULIN 4.1 4.0   ALT (SGPT) 52* 76*   AST (SGOT) 22 31   BILIRUBIN 1.0 0.6   ALK PHOS 117 89   LIPASE  --  14         Lab 01/10/24  0659 01/09/24  0509 01/08/24  1038 01/08/24  0838   PROBNP  --   --   --  78.6   HSTROP T  --   --  <6 <6   PROTIME 15.4* 15.3*  --   --    INR 1.21* 1.19*  --   --              Lab 01/10/24  0659   ABO TYPING A   RH TYPING Positive   ANTIBODY SCREEN Negative         Brief Urine Lab Results       None            Microbiology Results Abnormal       Procedure Component Value - Date/Time    Anaerobic Culture - Tissue, Pleural Cavity [[id number]]  (Normal) Collected: 01/11/24 1142    Lab Status: Preliminary result Specimen: Tissue from Pleural Cavity Updated: 01/14/24 1012     Anaerobic Culture No anaerobes isolated at 3 days    Blood Culture - Blood, Hand, Left [[id number]]  (Normal) Collected: 01/09/24 0525    Lab Status: Final result Specimen: Blood from Hand, Left Updated: 01/14/24 0705      Blood Culture No growth at 5 days    Tissue / Bone Culture - Tissue, Pleural Cavity [[id number]] Collected: 01/11/24 1142    Lab Status: Final result Specimen: Tissue from Pleural Cavity Updated: 01/14/24 0655     Tissue Culture No growth at 3 days     Gram Stain Few (2+) WBCs seen      No organisms seen    AFB Culture - Body Fluid, Pleural Cavity [[id number]] Collected: 01/08/24 1618    Lab Status: Preliminary result Specimen: Body Fluid from Pleural Cavity Updated: 01/13/24 1716     AFB Culture No AFB isolated at less than 1 week     AFB Stain No acid fast bacilli seen on concentrated smear    Fungus Culture - Body Fluid, Pleural Cavity [[id number]] Collected: 01/08/24 1618    Lab Status: Preliminary result Specimen: Body Fluid from Pleural Cavity Updated: 01/13/24 1716     Fungus Culture No fungus isolated at less than 1 week    Blood Culture - Blood, Arm, Right [[id number]]  (Normal) Collected: 01/08/24 1434    Lab Status: Final result Specimen: Blood from Arm, Right Updated: 01/13/24 1501     Blood Culture No growth at 5 days    Narrative:      Aerobic Bottle Only    Less than seven (7) mL's of blood was collected.  Insufficient quantity may yield false negative results.    Anaerobic Culture - Pleural Fluid, Pleural Cavity [[id number]]  (Normal) Collected: 01/08/24 1618    Lab Status: Final result Specimen: Pleural Fluid from Pleural Cavity Updated: 01/13/24 0952     Anaerobic Culture No anaerobes isolated at 5 days    Blood Culture - Blood, Arm, Left [[id number]]  (Normal) Collected: 01/08/24 0828    Lab Status: Final result Specimen: Blood from Arm, Left Updated: 01/13/24 0846     Blood Culture No growth at 5 days    Narrative:      Less than seven (7) mL's of blood was collected.  Insufficient quantity may yield false negative results.    AFB Culture - Tissue, Pleural Cavity [[id number]] Collected: 01/11/24 1142    Lab Status: Preliminary result Specimen: Tissue from Pleural Cavity Updated: 01/12/24 1351     AFB  Stain No acid fast bacilli seen on concentrated smear    Body Fluid Culture - Body Fluid, Pleural Cavity [[id number]] Collected: 01/08/24 1618    Lab Status: Final result Specimen: Body Fluid from Pleural Cavity Updated: 01/11/24 0817     Body Fluid Culture No growth at 3 days     Gram Stain Few (2+) WBCs seen      Few (2+) Red blood cells      No organisms seen    Fungus Smear - Body Fluid, Pleural Cavity [[id number]] Collected: 01/08/24 1618    Lab Status: Final result Specimen: Body Fluid from Pleural Cavity Updated: 01/09/24 1335     Fungal Stain No fungal elements seen    COVID PRE-OP / PRE-PROCEDURE SCREENING ORDER (NO ISOLATION) - Swab, Nasopharynx [[id number]]  (Normal) Collected: 01/08/24 0805    Lab Status: Final result Specimen: Swab from Nasopharynx Updated: 01/08/24 0915    Narrative:      The following orders were created for panel order COVID PRE-OP / PRE-PROCEDURE SCREENING ORDER (NO ISOLATION) - Swab, Nasopharynx.  Procedure                               Abnormality         Status                     ---------                               -----------         ------                     Respiratory Panel PCR w/...[[id number]]  Normal              Final result                 Please view results for these tests on the individual orders.    Respiratory Panel PCR w/COVID-19(SARS-CoV-2) VERONICA/MARCELO/ISIDRO/PAD/COR/JOHN In-House, NP Swab in UTM/VTM, 2 HR TAT - Swab, Nasopharynx [[id number]]  (Normal) Collected: 01/08/24 0805    Lab Status: Final result Specimen: Swab from Nasopharynx Updated: 01/08/24 0915     ADENOVIRUS, PCR Not Detected     Coronavirus 229E Not Detected     Coronavirus HKU1 Not Detected     Coronavirus NL63 Not Detected     Coronavirus OC43 Not Detected     COVID19 Not Detected     Human Metapneumovirus Not Detected     Human Rhinovirus/Enterovirus Not Detected     Influenza A PCR Not Detected     Influenza B PCR Not Detected     Parainfluenza Virus 1 Not Detected     Parainfluenza Virus 2 Not  Detected     Parainfluenza Virus 3 Not Detected     Parainfluenza Virus 4 Not Detected     RSV, PCR Not Detected     Bordetella pertussis pcr Not Detected     Bordetella parapertussis PCR Not Detected     Chlamydophila pneumoniae PCR Not Detected     Mycoplasma pneumo by PCR Not Detected    Narrative:      In the setting of a positive respiratory panel with a viral infection PLUS a negative procalcitonin without other underlying concern for bacterial infection, consider observing off antibiotics or discontinuation of antibiotics and continue supportive care. If the respiratory panel is positive for atypical bacterial infection (Bordetella pertussis, Chlamydophila pneumoniae, or Mycoplasma pneumoniae), consider antibiotic de-escalation to target atypical bacterial infection.            No radiology results from the last 24 hrs    Results for orders placed during the hospital encounter of 01/08/24    Adult Transthoracic Echo Complete w/ Color, Spectral and Contrast if Necessary Per Protocol    Interpretation Summary    Left ventricular systolic function is normal. Calculated left ventricular EF = 57.8% Left ventricular ejection fraction appears to be 56 - 60%.    Estimated right ventricular systolic pressure from tricuspid regurgitation is normal (<35 mmHg).    Mild MR      Current medications:  Scheduled Meds:acetaminophen, 1,000 mg, Oral, Q8H  cefTRIAXone, 2,000 mg, Intravenous, Q24H  [Held by provider] enoxaparin, 40 mg, Subcutaneous, Q24H  gabapentin, 100 mg, Oral, TID  methadone, 90 mg, Oral, Daily  metroNIDAZOLE, 500 mg, Oral, Q8H  pantoprazole, 40 mg, Oral, Q AM  polyethylene glycol, 17 g, Oral, Daily  Scopolamine, 1 patch, Transdermal, Q72H  senna-docusate sodium, 2 tablet, Oral, BID  sodium chloride, 10 mL, Intravenous, Q12H  vancomycin, 1,500 mg, Intravenous, Q8H      Continuous Infusions:niCARdipine, 5-15 mg/hr, Last Rate: Stopped (01/11/24 1500)  Pharmacy to dose vancomycin,       PRN Meds:.  acetaminophen  **OR** acetaminophen **OR** acetaminophen    bisacodyl    bisacodyl    Calcium Replacement - Follow Nurse / BPA Driven Protocol    HYDROmorphone    hydrOXYzine    Magnesium Standard Dose Replacement - Follow Nurse / BPA Driven Protocol    melatonin    nicotine    nitroglycerin    ondansetron ODT **OR** ondansetron    oxyCODONE    Pharmacy to dose vancomycin    Phosphorus Replacement - Follow Nurse / BPA Driven Protocol    polyethylene glycol    Potassium Replacement - Follow Nurse / BPA Driven Protocol    sodium chloride    sodium chloride    Assessment & Plan   Assessment & Plan     Active Hospital Problems    Diagnosis  POA    **Loculated pleural effusion [J90]  Yes    Pleural effusion [J90]  Yes    IVDU (intravenous drug user) [F19.90]  Yes      Resolved Hospital Problems   No resolved problems to display.        Brief Hospital Course to date:  Lion Solis is a 33 y.o. male with history of IV drug use on chronic methadone, vaping presented with right-sided chest pain and shortness of breath.  Patient was recently diagnosed with an upper respiratory infection and seen at an urgent treatment center 5 days prior to admission.  At that time the chest pain was felt to be related to a muscle strain and he was given steroids and naproxen.  He then became febrile and presented to the ED D.  In the ED had an elevated white blood cell count.  CT of the chest showed a loculated right pleural effusion with compressive atelectasis of the right middle lobe and right lower lobe.  Thoracentesis was performed and patient had a exudative effusion.  Gram stain was negative.  Blood cultures grew coag negative staph which is thought to be a contaminant.  HIV was negative.  Infectious disease and CT surgery were consulted.  Patient was taken to the OR on 1/11 by Dr. Vargas and underwent right-sided decortication and pleural fluid drainage.  Post procedurally patient was admitted to the ICU.  Transfer to telemetry on 1/14/2024.      Loculated right pleural effusion  -Likely complicated parapneumonic effusion  -Currently on room air. White blood cell count trending down  -Underwent right decortication by Dr. Vargas on 1/11/2023 with cultures.  -Cultures have remained negative.  -Pain control challenging given history of high-dose methadone.  Continue oxycodone, IV dilaudid.   -Chest tube management per CT surgery.  Discussed with CT surgery, 1 chest tube out today and possibly will remove the second chest tube tomorrow.  -ID following.  Continue vancomycin, Rocephin and Flagyl     History of IV drug use  -Continue home methadone, pain improved     Hyponatremia, resolved  -Sodium trended down to 133, not clinically significant.  Now resolved.        Expected Discharge Location and Transportation: home    Expected Discharge   Expected Discharge Date: 1/16/2024; Expected Discharge Time:      DVT prophylaxis:  Medical and mechanical DVT prophylaxis orders are present.     AM-PAC 6 Clicks Score (PT): 23 (01/14/24 2010)    CODE STATUS:   Code Status and Medical Interventions:   Ordered at: 01/11/24 1253     Code Status (Patient has no pulse and is not breathing):    CPR (Attempt to Resuscitate)     Medical Interventions (Patient has pulse or is breathing):    Full Support     I have prepared this progress note with copied portions of the prior day's progress note of my own authorship to preserve accuracy and maintain consistency of documentation. I have reviewed these portions and edited them for correctness. I verify that the above documentation accurately and truly represents the evaluation and management performed on today's date.       Temitope Barba MD  01/15/24

## 2024-01-16 ENCOUNTER — APPOINTMENT (OUTPATIENT)
Dept: GENERAL RADIOLOGY | Facility: HOSPITAL | Age: 34
End: 2024-01-16
Payer: COMMERCIAL

## 2024-01-16 LAB
ANION GAP SERPL CALCULATED.3IONS-SCNC: 14 MMOL/L (ref 5–15)
BUN SERPL-MCNC: 11 MG/DL (ref 6–20)
BUN/CREAT SERPL: 20.8 (ref 7–25)
CALCIUM SPEC-SCNC: 8.3 MG/DL (ref 8.6–10.5)
CHLORIDE SERPL-SCNC: 104 MMOL/L (ref 98–107)
CO2 SERPL-SCNC: 20 MMOL/L (ref 22–29)
CREAT SERPL-MCNC: 0.53 MG/DL (ref 0.76–1.27)
EGFRCR SERPLBLD CKD-EPI 2021: 135.7 ML/MIN/1.73
GLUCOSE SERPL-MCNC: 97 MG/DL (ref 65–99)
POTASSIUM SERPL-SCNC: 4.3 MMOL/L (ref 3.5–5.2)
SODIUM SERPL-SCNC: 138 MMOL/L (ref 136–145)
VANCOMYCIN SERPL-MCNC: 24.9 MCG/ML (ref 5–40)
VANCOMYCIN SERPL-MCNC: 42 MCG/ML (ref 5–40)

## 2024-01-16 PROCEDURE — 80048 BASIC METABOLIC PNL TOTAL CA: CPT

## 2024-01-16 PROCEDURE — 25010000002 CEFTRIAXONE PER 250 MG: Performed by: INTERNAL MEDICINE

## 2024-01-16 PROCEDURE — 71045 X-RAY EXAM CHEST 1 VIEW: CPT

## 2024-01-16 PROCEDURE — 99024 POSTOP FOLLOW-UP VISIT: CPT | Performed by: THORACIC SURGERY (CARDIOTHORACIC VASCULAR SURGERY)

## 2024-01-16 PROCEDURE — 25010000002 BUMETANIDE PER 0.5 MG

## 2024-01-16 PROCEDURE — 25010000002 VANCOMYCIN 10 G RECONSTITUTED SOLUTION: Performed by: INTERNAL MEDICINE

## 2024-01-16 PROCEDURE — 25810000003 SODIUM CHLORIDE 0.9 % SOLUTION: Performed by: INTERNAL MEDICINE

## 2024-01-16 PROCEDURE — 80202 ASSAY OF VANCOMYCIN: CPT

## 2024-01-16 PROCEDURE — 99231 SBSQ HOSP IP/OBS SF/LOW 25: CPT | Performed by: FAMILY MEDICINE

## 2024-01-16 RX ORDER — BUMETANIDE 0.25 MG/ML
2 INJECTION INTRAMUSCULAR; INTRAVENOUS ONCE
Status: COMPLETED | OUTPATIENT
Start: 2024-01-16 | End: 2024-01-16

## 2024-01-16 RX ADMIN — Medication 10 MG: at 20:53

## 2024-01-16 RX ADMIN — OXYCODONE HYDROCHLORIDE 7.5 MG: 15 TABLET ORAL at 08:44

## 2024-01-16 RX ADMIN — Medication 10 ML: at 20:53

## 2024-01-16 RX ADMIN — OXYCODONE HYDROCHLORIDE 7.5 MG: 15 TABLET ORAL at 03:09

## 2024-01-16 RX ADMIN — OXYCODONE HYDROCHLORIDE 7.5 MG: 15 TABLET ORAL at 17:34

## 2024-01-16 RX ADMIN — ACETAMINOPHEN 1000 MG: 500 TABLET, FILM COATED ORAL at 06:07

## 2024-01-16 RX ADMIN — METRONIDAZOLE 500 MG: 500 TABLET ORAL at 20:52

## 2024-01-16 RX ADMIN — PANTOPRAZOLE SODIUM 40 MG: 40 TABLET, DELAYED RELEASE ORAL at 06:07

## 2024-01-16 RX ADMIN — GABAPENTIN 100 MG: 100 CAPSULE ORAL at 08:43

## 2024-01-16 RX ADMIN — VANCOMYCIN HYDROCHLORIDE 1500 MG: 10 INJECTION, POWDER, LYOPHILIZED, FOR SOLUTION INTRAVENOUS at 11:51

## 2024-01-16 RX ADMIN — Medication 10 ML: at 11:51

## 2024-01-16 RX ADMIN — ACETAMINOPHEN 1000 MG: 500 TABLET, FILM COATED ORAL at 20:53

## 2024-01-16 RX ADMIN — ACETAMINOPHEN 1000 MG: 500 TABLET, FILM COATED ORAL at 15:14

## 2024-01-16 RX ADMIN — METRONIDAZOLE 500 MG: 500 TABLET ORAL at 06:07

## 2024-01-16 RX ADMIN — OXYCODONE HYDROCHLORIDE 7.5 MG: 15 TABLET ORAL at 12:56

## 2024-01-16 RX ADMIN — VANCOMYCIN HYDROCHLORIDE 1500 MG: 10 INJECTION, POWDER, LYOPHILIZED, FOR SOLUTION INTRAVENOUS at 03:09

## 2024-01-16 RX ADMIN — HYDROXYZINE HYDROCHLORIDE 25 MG: 25 TABLET, FILM COATED ORAL at 20:53

## 2024-01-16 RX ADMIN — GABAPENTIN 100 MG: 100 CAPSULE ORAL at 17:34

## 2024-01-16 RX ADMIN — BUMETANIDE 2 MG: 0.25 INJECTION INTRAMUSCULAR; INTRAVENOUS at 08:43

## 2024-01-16 RX ADMIN — CEFTRIAXONE 2000 MG: 2 INJECTION, POWDER, FOR SOLUTION INTRAMUSCULAR; INTRAVENOUS at 17:34

## 2024-01-16 RX ADMIN — VANCOMYCIN HYDROCHLORIDE 1500 MG: 10 INJECTION, POWDER, LYOPHILIZED, FOR SOLUTION INTRAVENOUS at 19:45

## 2024-01-16 RX ADMIN — GABAPENTIN 100 MG: 100 CAPSULE ORAL at 20:53

## 2024-01-16 RX ADMIN — METRONIDAZOLE 500 MG: 500 TABLET ORAL at 15:14

## 2024-01-16 RX ADMIN — METHADONE HYDROCHLORIDE 90 MG: 10 TABLET ORAL at 09:31

## 2024-01-16 NOTE — PROGRESS NOTES
INFECTIOUS DISEASE Progress Note    Lion Solis  1990  [id number]    Date of consult: 1/9/24    Admit date: 1/8/2024    Requesting Provider: Trae Zayas MD   Evaluating physician: Byron Curiel MD  Reason for Consultation:     IVDU, pleural effusion, GPC in blood     Chief Complaint: Chest pain      Subjective   History of present illness:  Lion Solis is a  33 y.o.  Yr old male with obesity, h/o vaping, chronic methadone use, and IV drug use with fentanyl who presented on 1/8 for evaluation of right-sided chest pain. He did have a recent upper respiratory illness resolved about 10 days prior to arrival.  About 5 days prior to arrival he developed right-sided chest pain was evaluated in urgent treatment center and was noted to have a fever up to 101°F.  He was diagnosed with a muscle strain was given a steroid shot and naproxen.  His pain will progressed to the point where he presented to the ER yesterday due to increased pain.  The pain on the right side of his chest is pleuritic/worse with deep inspiration.  He is not having any sputum production.      Since arrival, T-max has been 101°F.  He has been on 2 L nasal cannula supplemental O2 with SPO2 in the low 90s.  He was initially tachycardic up to 126 bpm but this is improved. White blood cell count was initially 17.97 but is trended up slightly to 18.66. MRSA PCR nares is positive. Urine drug screen was positive for methadone, opiates, and fentanyl. Respiratory viral PCR panel was negative. One blood culture set is positive for coagulase-negative staph with final ID in progress. Repeat blood cultures were sent today. HIV fourth-generation screen was negative. Chest x-ray showed bilateral pleural effusions.  CTA chest showed small loculated right pleural effusion and areas of compressive atelectasis of the right middle and right lower lobes and mild atelectasis of the left lower lobe. Right-sided thoracentesis procedure was done yesterday  yielding 360 cc of slightly cloudy fluid. Pleural fluid protein was elevated to 5.9 and LDH was elevated to 809 consistent with an exudate.  Pleural fluid Gram stain showed no organisms and culture is preliminarily no growth to date. A TTE was ordered and pending. The patient has been on IV vancomycin and ceftriaxone by the primary team. ID has been consult for antibiotic recommendations in the setting of the patient's positive blood culture, IV drug abuse, and pleural effusion that is exudative.    Subjective:    1/10/24: Patient is feeling slightly better today. Did have some fevers up to 100.9°F last night but has been afebrile today.  He is on 2 L nasal cannula Supplemental O2.  Still with a cough but no worse.  Still with some right-sided pleuritic chest pain.  CT surgery has evaluated the patient and plans for thoracotomy with decortication tomorrow.    1/11/24: patient underwent thoracotomy and decortication procedure today. Has right-sided chest tube in place. Did have some fevers yesterday evening but improved today. WBC is trending down. No new complaints today.     1/12/24: the patient is having some right-sided pleuritic chest pain but no worse. Right-sided chest tube remains in place. Fevers have resolved. No n/v/d. No new rashes reported. Surgical cultures are prelim NGTD.    1/15/23: the patient is doing ok today. Right-sided pleuritic chest pain has improved somewhat. 1 of his chest tubes was removed today. No worsening shortness of breath. He does have a cough productive of whitish sputum. No fevers. No n/v/d. No new rashes. Surgical cx are NGTD    1/16/23: The patient is feeling okay today.  He is breathing well on room air.  Cough has improved.  Right-sided pleuritic chest pain has improved.  His other chest tube was removed today.  He is not having any fevers.  No nausea, vomiting, or diarrhea.    Past Medical History:   Diagnosis Date    MRSA infection        Past Surgical History:   Procedure  Laterality Date    BRONCHOSCOPY THORACOTOMY Right 1/11/2024    Procedure: THORACOTOMY WITH DECORTICATION;  Surgeon: Douglas Vargas MD;  [facility];  Service: Cardiothoracic;  Laterality: Right;       Pediatric History   Patient Parents    Not on file     Other Topics Concern    Not on file   Social History Narrative    Not on file   The patient uses IV drugs.  He claims to use heroin but he notes that fentanyl was positive and his UDS and it was probably fentanyl.  He does Vape. He is currently unemployed.  He is .    family history is not on file.    No Known Allergies      There is no immunization history on file for this patient.    Medication:    Current Facility-Administered Medications:     acetaminophen (TYLENOL) tablet 650 mg, 650 mg, Oral, Q4H PRN, 650 mg at 01/11/24 0549 **OR** acetaminophen (TYLENOL) 160 MG/5ML oral solution 650 mg, 650 mg, Oral, Q4H PRN, 650 mg at 01/09/24 2001 **OR** acetaminophen (TYLENOL) suppository 650 mg, 650 mg, Rectal, Q4H PRN, Arpit Devi PA-C    acetaminophen (TYLENOL) tablet 1,000 mg, 1,000 mg, Oral, Q8H, Arpit Devi PA-C, 1,000 mg at 01/16/24 1514    bisacodyl (DULCOLAX) EC tablet 5 mg, 5 mg, Oral, Daily PRN, Arpit Devi PA-C    bisacodyl (DULCOLAX) suppository 10 mg, 10 mg, Rectal, Daily PRN, Arpit Devi PA-C    Calcium Replacement - Follow Nurse / BPA Driven Protocol, , Does not apply, PRN, Arpit Devi PA-C    cefTRIAXone (ROCEPHIN) 2,000 mg in sodium chloride 0.9 % 100 mL IVPB, 2,000 mg, Intravenous, Q24H, Byron Curiel MD, Last Rate: 200 mL/hr at 01/15/24 1657, 2,000 mg at 01/15/24 1657    [Held by provider] Enoxaparin Sodium (LOVENOX) syringe 40 mg, 40 mg, Subcutaneous, Q24H, Paul Plummer MD    gabapentin (NEURONTIN) capsule 100 mg, 100 mg, Oral, TID, Arpit Devi PA-C, 100 mg at 01/16/24 0843    HYDROmorphone (DILAUDID) injection 0.5 mg, 0.5 mg, Intravenous, Q2H PRN, Arpit Devi PA-C, 0.5 mg  at 01/15/24 0857    hydrOXYzine (ATARAX) tablet 25 mg, 25 mg, Oral, Nightly PRN, Pritesh Watts, APRN, 25 mg at 01/15/24 2022    Magnesium Standard Dose Replacement - Follow Nurse / BPA Driven Protocol, , Does not apply, PRN, Arpit Devi PA-C    melatonin tablet 10 mg, 10 mg, Oral, Nightly PRN, Arpit Devi PA-C, 10 mg at 01/13/24 2026    methadone (DOLOPHINE) tablet 90 mg, 90 mg, Oral, Daily, Temitope Barba MD, 90 mg at 01/16/24 0931    metroNIDAZOLE (FLAGYL) tablet 500 mg, 500 mg, Oral, Q8H, Byron Curiel MD, 500 mg at 01/16/24 1514    nicotine (NICODERM CQ) 21 MG/24HR patch 1 patch, 1 patch, Transdermal, Daily PRN, Arpit Devi PA-C    nitroglycerin (NITROSTAT) SL tablet 0.4 mg, 0.4 mg, Sublingual, Q5 Min PRN, Arpit Devi PA-C    ondansetron ODT (ZOFRAN-ODT) disintegrating tablet 4 mg, 4 mg, Oral, Q6H PRN **OR** ondansetron (ZOFRAN) injection 4 mg, 4 mg, Intravenous, Q6H PRN, Arpit Devi PA-C    oxyCODONE (ROXICODONE) immediate release tablet 7.5 mg, 7.5 mg, Oral, Q4H PRN, Arpit Devi PA-C, 7.5 mg at 01/16/24 1256    pantoprazole (PROTONIX) EC tablet 40 mg, 40 mg, Oral, Q AM, Arpit Devi PA-C, 40 mg at 01/16/24 0607    Pharmacy to dose vancomycin, , Does not apply, Continuous PRN, Byron Curiel MD    Phosphorus Replacement - Follow Nurse / BPA Driven Protocol, , Does not apply, PRN, Arpit Devi PA-C    polyethylene glycol (MIRALAX) packet 17 g, 17 g, Oral, Daily, Arpit Devi PA-C, 17 g at 01/14/24 0931    polyethylene glycol (MIRALAX) packet 17 g, 17 g, Oral, Daily PRN, Arpit Devi PA-C    Potassium Replacement - Follow Nurse / BPA Driven Protocol, , Does not apply, PRN, Arpit Devi PA-C    scopolamine patch 1 mg/72 hr, 1 patch, Transdermal, Q72H, Temitope Barba MD, 1 patch at 01/14/24 1139    sennosides-docusate (PERICOLACE) 8.6-50 MG per tablet 2 tablet, 2 tablet, Oral, BID, Arpit Devi PA-C, 2 tablet at  "01/14/24 0930    sodium chloride 0.9 % flush 10 mL, 10 mL, Intravenous, Q12H, Arpit Devi PA-C, 10 mL at 01/16/24 1151    sodium chloride 0.9 % flush 10 mL, 10 mL, Intravenous, PRN, Arpit Devi PA-C    sodium chloride 0.9 % infusion 40 mL, 40 mL, Intravenous, PRN, Arpit Devi PA-C    vancomycin IVPB 1500 mg in 0.9% NaCl (Premix) 500 mL, 1,500 mg, Intravenous, Q8H, Byron Curiel MD, Last Rate: 333.3 mL/hr at 01/16/24 1151, 1,500 mg at 01/16/24 1151    Please refer to the medical record for a full medication list    Review of Systems:    As above    Physical Exam:   Vital Signs   Temp:  [96.2 °F (35.7 °C)-98.9 °F (37.2 °C)] 98.4 °F (36.9 °C)  Heart Rate:  [] 100  Resp:  [14-16] 14  BP: (115-139)/(73-92) 139/89    Temp  Min: 96.2 °F (35.7 °C)  Max: 98.9 °F (37.2 °C)  BP  Min: 115/77  Max: 139/89  Pulse  Min: 69  Max: 103  Resp  Min: 14  Max: 16  SpO2  Min: 91 %  Max: 95 %    Blood pressure 139/89, pulse 100, temperature 98.4 °F (36.9 °C), temperature source Oral, resp. rate 14, height 175 cm (68.9\"), weight 132 kg (291 lb 0.1 oz), SpO2 93%.  GENERAL: awake. Ill appearing. Morbidly obese.  HEENT:  Normocephalic, atraumatic.  No external oral lesions noted  EYES: No conjunctival injection. No icterus.   HEART: RRR, no murmur  LUNGS: Diminished breath sounds in the right lung base. Nonlabored breathing on room air. Chest tubes have been removed  ABDOMEN: Nondistended  SKIN: no generalized rashes. Does have multiple tattoos over his body including his torso.  PSYCHIATRIC: cooperative.  Appropriate mood and affect  EXT:  No cellulitic change.  NEURO: awake and alert. Oriented x4.  Normal speech and cognition    PIV site is without erythema    Results Review:   I reviewed the patient's new clinical results.  I reviewed the patient's new imaging results and agree with the interpretation.  I reviewed the patient's other test results and agree with the interpretation    Results from last 7 days " "  Lab Units 01/15/24  0426 01/14/24  0700 01/13/24  0711   WBC 10*3/mm3 13.25* 13.00* 13.21*   HEMOGLOBIN g/dL 12.4* 12.0* 11.5*   HEMATOCRIT % 37.2* 36.0* 35.0*   PLATELETS 10*3/mm3 294 315 300     Results from last 7 days   Lab Units 01/16/24  0634   SODIUM mmol/L 138   POTASSIUM mmol/L 4.3   CHLORIDE mmol/L 104   CO2 mmol/L 20.0*   BUN mg/dL 11   CREATININE mg/dL 0.53*   GLUCOSE mg/dL 97   CALCIUM mg/dL 8.3*               Results from last 7 days   Lab Units 01/11/24  0213   CRP mg/dL 28.31*     Results from last 7 days   Lab Units 01/16/24  0634 01/15/24  0426 01/13/24  0711 01/11/24  0832 01/11/24  0213 01/10/24  0659   VANCOMYCIN TR mcg/mL  --   --   --  4.90*  --  4.80*   VANCOMYCIN RM mcg/mL 24.90 42.00* 13.10  --  16.90  --            Estimated Creatinine Clearance: 266.7 mL/min (A) (by C-G formula based on SCr of 0.53 mg/dL (L)).     Procalitonin Results:      Lab 01/11/24 0213   PROCALCITONIN 0.37*      Brief Urine Lab Results       None           No results found for: \"SITE\", \"ALLENTEST\", \"PHART\", \"TFJ9WOH\", \"PO2ART\", \"UVD5LJI\", \"BASEEXCESS\", \"K2VMFXHK\", \"HGBBG\", \"HCTABG\", \"OXYHEMOGLOBI\", \"METHHGBN\", \"CARBOXYHGB\", \"CO2CT\", \"BAROMETRIC\", \"MODALITY\", \"FIO2\"     Microbiology:  Blood Culture   Date Value Ref Range Status   01/08/2024 Abnormal Stain (C)  Preliminary     BCID, PCR   Date Value Ref Range Status   01/08/2024 (A) Negative by BCID PCR. Culture to Follow. Final    Staph spp, not aureus or lugdunensis. Identification by BCID2 PCR.       Blood Culture   Date Value Ref Range Status   01/08/2024 Abnormal Stain (C)  Preliminary     Body Fluid Culture   Date Value Ref Range Status   01/08/2024 No growth  Preliminary     BCID, PCR   Date Value Ref Range Status   01/08/2024 (A) Negative by BCID PCR. Culture to Follow. Final    Staph spp, not aureus or lugdunensis. Identification by BCID2 PCR.   (      Radiology:  Imaging Results (Last 72 Hours)       Procedure Component Value Units Date/Time    XR Chest 1 " View [[id number]] Collected: 01/16/24 0802     Updated: 01/16/24 0807    Narrative:      XR CHEST 1 VW    Date of Exam: 1/16/2024 12:29 AM EST    Indication: postop    Comparison: 1/15/2024.    Findings:  There is continued mild right basilar atelectasis. There may be an associated small effusion. Heart size is normal. Right chest tube is unchanged in position. There is no identifiable pneumothorax.      Impression:      Impression:  No significant change since 1 day prior.      Electronically Signed: Reyna Caldwell MD    1/16/2024 8:03 AM EST    Workstation ID: [id number]    XR Chest 1 View [[id number]] Collected: 01/15/24 0801     Updated: 01/15/24 0805    Narrative:      XR CHEST 1 VW    Date of Exam: 1/15/2024 6:43 AM CST    Indication: postop    Comparison: Chest x-ray 1/13/2024    Findings:  The heart is prominent yet stable in size. There is right lower lung volume loss with elevation of the right hemidiaphragm and segmental atelectasis at the lung base. Mild right pleural effusion is noted unchanged. No evidence for pneumothorax.      Impression:      Impression:    1. Persistent right lower lobe atelectasis and mild pleural effusion.      Electronically Signed: Crystal Navarro MD    1/15/2024 7:02 AM CST    Workstation ID: [id number]        I read the patient's chest xray from today- No significant change in mild right basilar airspace disease and pleural effusion.    IMPRESSION:     Problems:  Sepsis with fevers, tachycardia, and leukocytosis-Suspect possibly due to an empyema on the right side.  1 blood culture set was positive for staph epidermidis which likely represents a contaminate. TTE was negative for vegetations. Now s/p thoracotomy and decortication procedure on 1/11. Improving  Right-sided Exudative pleural effusion, possible empyema   Staph epidermidis in 1 blood culture set so far-likely a contaminate  IV drug abuse  MRSA nasal colonization    RECOMMENDATIONS:    -Continue to follow CBC and  BMP  -Continue ceftriaxone 2 g IV every 24 hours and IV vancomycin with pharmacy dosing assistance for now.  -Continue flagyl 500 mg PO TID for some anaerobic coverage for possible empyema  -Status post thoracotomy and decortication procedure on 1/11. Cultures were no growth.  Chest tubes have been removed.  -Plan for repeat chest x-ray in the morning    The patient is not a good candidate for outpatient IV antibiotics with a PICC line in the setting of his recent IV drug abuse.      If his Chest x-ray looks good tomorrow and he is cleared by CT surgery team then I am okay with his discharge with the below antibiotic plan.    UM/JUAN PABLO:  Cefuroxime 500 mg PO BID x 2 weeks  Flagyl 500 mg PO TID x 2 weeks  Bactrim DS PO BID x 2 weeks  Follow up in my clinic in 2 weeks  Please fax a copy of my orders to [facility] at [phone] and call [phone] with final discharge plans       I discussed with the patient and his wife at bedside today    I discussed with Dr. Escobar today    Thank you for asking me to see Lion Solis.      Complex MDM    Byron Curiel MD  1/16/2024

## 2024-01-16 NOTE — CASE MANAGEMENT/SOCIAL WORK
Continued Stay Note   [place]     Patient Name: Lion Solis  MRN: [id number]  Today's Date: 1/16/2024    Admit Date: 1/8/2024    Plan: Home   Discharge Plan       Row Name 01/16/24 0946       Plan    Plan Home    Patient/Family in Agreement with Plan yes    Plan Comments Plan is home on PO antibiotics. Antibiotic order and doctors note was faxed to [facility] today. CM will continue to follow.    Final Discharge Disposition Code 01 - home or self-care                   Discharge Codes    No documentation.                 Expected Discharge Date and Time       Expected Discharge Date Expected Discharge Time    Jan 16, 2024               Arpit Escoto RN

## 2024-01-16 NOTE — PROGRESS NOTES
INFECTIOUS DISEASE Progress Note    Lion Solis  1990  [id number]    Date of consult: 1/9/24    Admit date: 1/8/2024    Requesting Provider: Trae Zayas MD   Evaluating physician: Byron Curiel MD  Reason for Consultation:     IVDU, pleural effusion, GPC in blood     Chief Complaint: Chest pain      Subjective   History of present illness:  Lion Solis is a  33 y.o.  Yr old male with obesity, h/o vaping, chronic methadone use, and IV drug use with fentanyl who presented on 1/8 for evaluation of right-sided chest pain. He did have a recent upper respiratory illness resolved about 10 days prior to arrival.  About 5 days prior to arrival he developed right-sided chest pain was evaluated in urgent treatment center and was noted to have a fever up to 101°F.  He was diagnosed with a muscle strain was given a steroid shot and naproxen.  His pain will progressed to the point where he presented to the ER yesterday due to increased pain.  The pain on the right side of his chest is pleuritic/worse with deep inspiration.  He is not having any sputum production.      Since arrival, T-max has been 101°F.  He has been on 2 L nasal cannula supplemental O2 with SPO2 in the low 90s.  He was initially tachycardic up to 126 bpm but this is improved. White blood cell count was initially 17.97 but is trended up slightly to 18.66. MRSA PCR nares is positive. Urine drug screen was positive for methadone, opiates, and fentanyl. Respiratory viral PCR panel was negative. One blood culture set is positive for coagulase-negative staph with final ID in progress. Repeat blood cultures were sent today. HIV fourth-generation screen was negative. Chest x-ray showed bilateral pleural effusions.  CTA chest showed small loculated right pleural effusion and areas of compressive atelectasis of the right middle and right lower lobes and mild atelectasis of the left lower lobe. Right-sided thoracentesis procedure was done yesterday  yielding 360 cc of slightly cloudy fluid. Pleural fluid protein was elevated to 5.9 and LDH was elevated to 809 consistent with an exudate.  Pleural fluid Gram stain showed no organisms and culture is preliminarily no growth to date. A TTE was ordered and pending. The patient has been on IV vancomycin and ceftriaxone by the primary team. ID has been consult for antibiotic recommendations in the setting of the patient's positive blood culture, IV drug abuse, and pleural effusion that is exudative.    Subjective:    1/10/24: Patient is feeling slightly better today. Did have some fevers up to 100.9°F last night but has been afebrile today.  He is on 2 L nasal cannula Supplemental O2.  Still with a cough but no worse.  Still with some right-sided pleuritic chest pain.  CT surgery has evaluated the patient and plans for thoracotomy with decortication tomorrow.    1/11/24: patient underwent thoracotomy and decortication procedure today. Has right-sided chest tube in place. Did have some fevers yesterday evening but improved today. WBC is trending down. No new complaints today.     1/12/24: the patient is having some right-sided pleuritic chest pain but no worse. Right-sided chest tube remains in place. Fevers have resolved. No n/v/d. No new rashes reported. Surgical cultures are prelim NGTD.    1/15/23: the patient is doing ok today. Right-sided pleuritic chest pain has improved somewhat. 1 of his chest tubes was removed today. No worsening shortness of breath. He does have a cough productive of whitish sputum. No fevers. No n/v/d. No new rashes. Surgical cx are NGTD    Past Medical History:   Diagnosis Date    MRSA infection        Past Surgical History:   Procedure Laterality Date    BRONCHOSCOPY THORACOTOMY Right 1/11/2024    Procedure: THORACOTOMY WITH DECORTICATION;  Surgeon: Douglas Vargas MD;  Location: [facility];  Service: Cardiothoracic;  Laterality: Right;       Pediatric History   Patient Parents    Not  on file     Other Topics Concern    Not on file   Social History Narrative    Not on file   The patient uses IV drugs.  He claims to use heroin but he notes that fentanyl was positive and his UDS and it was probably fentanyl.  He does Vape. He is currently unemployed.  He is .    family history is not on file.    No Known Allergies      There is no immunization history on file for this patient.    Medication:    Current Facility-Administered Medications:     acetaminophen (TYLENOL) tablet 650 mg, 650 mg, Oral, Q4H PRN, 650 mg at 01/11/24 0549 **OR** acetaminophen (TYLENOL) 160 MG/5ML oral solution 650 mg, 650 mg, Oral, Q4H PRN, 650 mg at 01/09/24 2001 **OR** acetaminophen (TYLENOL) suppository 650 mg, 650 mg, Rectal, Q4H PRN, Arpit Devi PA-C    acetaminophen (TYLENOL) tablet 1,000 mg, 1,000 mg, Oral, Q8H, Arpit Devi PA-C, 1,000 mg at 01/15/24 1346    bisacodyl (DULCOLAX) EC tablet 5 mg, 5 mg, Oral, Daily PRN, Arpit Devi PA-C    bisacodyl (DULCOLAX) suppository 10 mg, 10 mg, Rectal, Daily PRN, Arpit Devi PA-C    Calcium Replacement - Follow Nurse / BPA Driven Protocol, , Does not apply, PRN, Arpit Devi PA-C    cefTRIAXone (ROCEPHIN) 2,000 mg in sodium chloride 0.9 % 100 mL IVPB, 2,000 mg, Intravenous, Q24H, Byron Curiel MD, Last Rate: 200 mL/hr at 01/15/24 1657, 2,000 mg at 01/15/24 1657    [Held by provider] Enoxaparin Sodium (LOVENOX) syringe 40 mg, 40 mg, Subcutaneous, Q24H, Paul Plummer MD    gabapentin (NEURONTIN) capsule 100 mg, 100 mg, Oral, TID, Arpit Devi PA-C, 100 mg at 01/15/24 1657    HYDROmorphone (DILAUDID) injection 0.5 mg, 0.5 mg, Intravenous, Q2H PRN, Arpit Devi PA-C, 0.5 mg at 01/15/24 0857    hydrOXYzine (ATARAX) tablet 25 mg, 25 mg, Oral, Nightly PRN, Pritesh Watts, FARRAH, 25 mg at 01/14/24 2116    Magnesium Standard Dose Replacement - Follow Nurse / BPA Driven Protocol, , Does not apply, PRN, Arpit Devi,  MICHELLE    melatonin tablet 10 mg, 10 mg, Oral, Nightly PRN, Arpit Devi PA-C, 10 mg at 01/13/24 2026    methadone (DOLOPHINE) tablet 90 mg, 90 mg, Oral, Daily, Temitope Barba MD, 90 mg at 01/15/24 0941    metroNIDAZOLE (FLAGYL) tablet 500 mg, 500 mg, Oral, Q8H, Arpit Devi PA-C, 500 mg at 01/15/24 1346    nicotine (NICODERM CQ) 21 MG/24HR patch 1 patch, 1 patch, Transdermal, Daily PRN, Arpit Devi PA-C    nitroglycerin (NITROSTAT) SL tablet 0.4 mg, 0.4 mg, Sublingual, Q5 Min PRN, Arpit Devi PA-C    ondansetron ODT (ZOFRAN-ODT) disintegrating tablet 4 mg, 4 mg, Oral, Q6H PRN **OR** ondansetron (ZOFRAN) injection 4 mg, 4 mg, Intravenous, Q6H PRN, Arpit Devi PA-C    oxyCODONE (ROXICODONE) immediate release tablet 7.5 mg, 7.5 mg, Oral, Q4H PRN, Arpit Devi PA-C, 7.5 mg at 01/15/24 1550    pantoprazole (PROTONIX) EC tablet 40 mg, 40 mg, Oral, Q AM, Arpit Devi PA-C, 40 mg at 01/15/24 0606    Phosphorus Replacement - Follow Nurse / BPA Driven Protocol, , Does not apply, PRN, Arpit Devi PA-C    polyethylene glycol (MIRALAX) packet 17 g, 17 g, Oral, Daily, Arpit Devi PA-C, 17 g at 01/14/24 0931    polyethylene glycol (MIRALAX) packet 17 g, 17 g, Oral, Daily PRN, Arpit Devi PA-C    Potassium Replacement - Follow Nurse / BPA Driven Protocol, , Does not apply, PRN, Arpit Devi PA-C    scopolamine patch 1 mg/72 hr, 1 patch, Transdermal, Q72H, Temitope Barba MD, 1 patch at 01/14/24 1139    sennosides-docusate (PERICOLACE) 8.6-50 MG per tablet 2 tablet, 2 tablet, Oral, BID, Arpit Devi PA-C, 2 tablet at 01/14/24 0930    sodium chloride 0.9 % flush 10 mL, 10 mL, Intravenous, Q12H, Arpit Devi PA-C, 10 mL at 01/15/24 0943    sodium chloride 0.9 % flush 10 mL, 10 mL, Intravenous, PRN, Arpit Devi PA-C    sodium chloride 0.9 % infusion 40 mL, 40 mL, Intravenous, PRN, Arpit Devi PA-C    vancomycin IVPB 1500 mg in 0.9%  "NaCl (Premix) 500 mL, 1,500 mg, Intravenous, Q8H, Byron Curiel MD, Last Rate: 333.3 mL/hr at 01/15/24 1934, 1,500 mg at 01/15/24 1934    Please refer to the medical record for a full medication list    Review of Systems:    As above    Physical Exam:   Vital Signs   Temp:  [97.8 °F (36.6 °C)-98.8 °F (37.1 °C)] 98.8 °F (37.1 °C)  Heart Rate:  [64-94] 71  Resp:  [16] 16  BP: (118-142)/(74-91) 142/91    Temp  Min: 97.8 °F (36.6 °C)  Max: 98.8 °F (37.1 °C)  BP  Min: 118/74  Max: 142/91  Pulse  Min: 64  Max: 94  Resp  Min: 16  Max: 16  SpO2  Min: 91 %  Max: 95 %    Blood pressure 142/91, pulse 71, temperature 98.8 °F (37.1 °C), temperature source Oral, resp. rate 16, height 175 cm (68.9\"), weight 132 kg (291 lb 0.1 oz), SpO2 91%.  GENERAL: awake. Ill appearing. Morbidly obese.  HEENT:  Normocephalic, atraumatic.  No external oral lesions noted  EYES: No conjunctival injection. No icterus.   HEART: RRR, no murmur  LUNGS: Diminished breath sounds in the right lung base. Nonlabored breathing nasal cannula. One of his right-sided chest tubes has been removed and the other one remains in place  ABDOMEN: Nondistended  GENITAL: no Talavera catheter  SKIN: no generalized rashes. Does have multiple tattoos over his body including his torso.  PSYCHIATRIC: cooperative.  Appropriate mood and affect  EXT:  No cellulitic change.  NEURO: awake and alert. Oriented x4.  Normal speech and cognition    PIV site is without erythema    Results Review:   I reviewed the patient's new clinical results.  I reviewed the patient's new imaging results and agree with the interpretation.  I reviewed the patient's other test results and agree with the interpretation    Results from last 7 days   Lab Units 01/15/24  0426 01/14/24  0700 01/13/24  0711   WBC 10*3/mm3 13.25* 13.00* 13.21*   HEMOGLOBIN g/dL 12.4* 12.0* 11.5*   HEMATOCRIT % 37.2* 36.0* 35.0*   PLATELETS 10*3/mm3 294 315 300     Results from last 7 days   Lab Units 01/15/24  0426 " "  SODIUM mmol/L 139   POTASSIUM mmol/L 3.9   CHLORIDE mmol/L 105   CO2 mmol/L 22.0   BUN mg/dL 9   CREATININE mg/dL 0.51*   GLUCOSE mg/dL 103*   CALCIUM mg/dL 8.2*     Results from last 7 days   Lab Units 01/09/24  0509   ALK PHOS U/L 117   BILIRUBIN mg/dL 1.0   ALT (SGPT) U/L 52*   AST (SGOT) U/L 22         Results from last 7 days   Lab Units 01/11/24  0213   CRP mg/dL 28.31*     Results from last 7 days   Lab Units 01/13/24  0711 01/11/24  0832 01/11/24  0213 01/10/24  0659   VANCOMYCIN TR mcg/mL  --  4.90*  --  4.80*   VANCOMYCIN RM mcg/mL 13.10  --  16.90  --            Estimated Creatinine Clearance: 277.1 mL/min (A) (by C-G formula based on SCr of 0.51 mg/dL (L)).     Procalitonin Results:      Lab 01/11/24 0213   PROCALCITONIN 0.37*      Brief Urine Lab Results       None           No results found for: \"SITE\", \"ALLENTEST\", \"PHART\", \"EBT5WJB\", \"PO2ART\", \"NWB4EHY\", \"BASEEXCESS\", \"P6NGKBMC\", \"HGBBG\", \"HCTABG\", \"OXYHEMOGLOBI\", \"METHHGBN\", \"CARBOXYHGB\", \"CO2CT\", \"BAROMETRIC\", \"MODALITY\", \"FIO2\"     Microbiology:  Blood Culture   Date Value Ref Range Status   01/08/2024 Abnormal Stain (C)  Preliminary     BCID, PCR   Date Value Ref Range Status   01/08/2024 (A) Negative by BCID PCR. Culture to Follow. Final    Staph spp, not aureus or lugdunensis. Identification by BCID2 PCR.       Blood Culture   Date Value Ref Range Status   01/08/2024 Abnormal Stain (C)  Preliminary     Body Fluid Culture   Date Value Ref Range Status   01/08/2024 No growth  Preliminary     BCID, PCR   Date Value Ref Range Status   01/08/2024 (A) Negative by BCID PCR. Culture to Follow. Final    Staph spp, not aureus or lugdunensis. Identification by BCID2 PCR.   (      Radiology:  Imaging Results (Last 72 Hours)       Procedure Component Value Units Date/Time    XR Chest 1 View [[id number]] Collected: 01/15/24 0801     Updated: 01/15/24 0805    Narrative:      XR CHEST 1 VW    Date of Exam: 1/15/2024 6:43 AM CST    Indication: " postop    Comparison: Chest x-ray 1/13/2024    Findings:  The heart is prominent yet stable in size. There is right lower lung volume loss with elevation of the right hemidiaphragm and segmental atelectasis at the lung base. Mild right pleural effusion is noted unchanged. No evidence for pneumothorax.      Impression:      Impression:    1. Persistent right lower lobe atelectasis and mild pleural effusion.      Electronically Signed: Crystal Navarro MD    1/15/2024 7:02 AM CST    Workstation ID: [id number]    XR Chest 1 View [[id number]] Collected: 01/13/24 0923     Updated: 01/13/24 0928    Narrative:      XR CHEST 1 VW    Date of Exam: 1/13/2024 2:25 AM EST    Indication: PNEUMOTHORAX  Postop    Comparison:  1/12/2024    Findings:  2 right-sided chest tubes. Cardiac size is similar to prior exam. Mildly increased linear atelectasis in the right basilar lung. Small right layering pleural effusion. No substantial pneumothorax. No evidence of acute osseous abnormalities. Visualized   upper abdomen is unremarkable.      Impression:      Impression:  Mildly increased linear atelectasis in the right basal lung.    Small right layering pleural effusion.    No substantial pneumothorax seen.      Electronically Signed: Phoenix Cook MD    1/13/2024 9:25 AM EST    Workstation ID: [id number]        I read the patient's chest xray from today- persistent right lower lobe air    space disease and mild pleural effusion with chest tube in place    IMPRESSION:     Problems:  Sepsis with fevers, tachycardia, and leukocytosis-Suspect possibly due to an empyema on the right side.  1 blood culture set was positive for staph epidermidis which likely represents a contaminate. TTE was negative for vegetations. Now s/p thoracotomy and decortication procedure on 1/11. Improving  Right-sided Exudative pleural effusion, possible empyema   Staph epidermidis in 1 blood culture set so far-likely a contaminate  IV drug abuse  MRSA nasal  colonization    RECOMMENDATIONS:    -Continue to follow CBC and BMP  -TTE was without any vegetations  -Continue ceftriaxone 2 g IV every 24 hours and IV vancomycin with pharmacy dosing assistance for now.  -Continue flagyl 500 mg PO TID for some anaerobic coverage for possible empyema  -Status post thoracotomy and decortication procedure on 1/11. I will follow cultures from this procedure, currently NGTD  -repeat chest xray ordered by CT surgery for the AM    The patient is not a good candidate for outpatient IV antibiotics with a PICC line in the setting of his recent IV drug abuse.      If the patient continues to improve and his chest tube is removed and he is cleared by CT surgery the we could consider discharge soon with the below antibiotic plan.    UM/JUAN PABLO:  Cefuroxime 500 mg PO BID x 2 weeks  Flagyl 500 mg PO TID x 2 weeks  Bactrim DS PO BID x 2 weeks  Follow up in my clinic in 2 weeks  Please fax a copy of my orders to [facility] at [phone] and call [phone] with final discharge plans       I discussed with the patient and his wife at bedside today      Thank you for asking me to see Lion Solis.      Complex MDM    Byron Curiel MD  1/15/2024

## 2024-01-16 NOTE — PROGRESS NOTES
Reviewed procedure with patient -- verbalized understanding.  Right chest tube removed -- intact.  Dressed with 4x4s, & medipore tape.  Patient tolerated well.     ..Electronically signed by FARRAH Garzon, 01/16/24, 9:02 AM EST.

## 2024-01-16 NOTE — PROGRESS NOTES
Pharmacy Consult-Vancomycin Dosing  Lion Solis is a  33 y.o. male receiving vancomycin therapy.     Indication: Suspected bacteremia/endocarditis  Consulting Provider: Luis Larson  ID Consult: Yes    Goal AUC: 400 - 600 mg/L*hr    Current Antimicrobial Therapy  Anti-Infectives (From admission, onward)      Ordered     Dose/Rate Route Frequency Start Stop    01/15/24 1945  Pharmacy to dose vancomycin        Ordering Provider: Byron Curiel MD     Does not apply Continuous PRN 01/15/24 1945 01/22/24 1944 01/14/24 1037  vancomycin IVPB 1500 mg in 0.9% NaCl (Premix) 500 mL        Ordering Provider: Byron Curiel MD    1,500 mg  333.3 mL/hr over 90 Minutes Intravenous Every 8 Hours 01/14/24 1130 01/21/24 1129    01/11/24 1400  vancomycin IVPB 1500 mg in 0.9% NaCl (Premix) 500 mL        Ordering Provider: Byron Curiel MD    1,500 mg  333.3 mL/hr over 90 Minutes Intravenous Every 8 Hours 01/11/24 1600 01/14/24 0840    01/09/24 1310  ceFAZolin in Sodium Chloride (ANCEF) IVPB solution 3,000 mg        Ordering Provider: Gaudencio Justice PA    3,000 mg  200 mL/hr over 30 Minutes Intravenous Once 01/11/24 1030 01/11/24 1117    01/09/24 0848  metroNIDAZOLE (FLAGYL) tablet 500 mg        Ordering Provider: Byron Curiel MD    500 mg Oral Every 8 Hours Scheduled 01/09/24 0900 01/22/24 1944    01/08/24 1504  vancomycin 2750 mg/500 mL 0.9% NS IVPB ([facility])        Ordering Provider: Marito Cross RPH    20 mg/kg × 132 kg  over 165 Minutes Intravenous Once 01/08/24 1800 01/09/24 0135    01/08/24 1401  cefTRIAXone (ROCEPHIN) 2,000 mg in sodium chloride 0.9 % 100 mL IVPB        Ordering Provider: Byron Curiel MD    2,000 mg  200 mL/hr over 30 Minutes Intravenous Every 24 Hours 01/08/24 1700 01/21/24 1659    01/08/24 1401  Pharmacy to dose vancomycin        Ordering Provider: Arpit Devi PA-C     Does not apply Continuous PRN 01/08/24 1600 01/15/24 1559            Allergies  Allergies as of  "01/08/2024    (No Known Allergies)       Labs    Results from last 7 days   Lab Units 01/16/24  0634 01/15/24  0426 01/14/24  0700   BUN mg/dL 11 9 10   CREATININE mg/dL 0.53* 0.51* 0.50*       Results from last 7 days   Lab Units 01/15/24  0426 01/14/24  0700 01/13/24  0711   WBC 10*3/mm3 13.25* 13.00* 13.21*       Evaluation of Dosing     Last Dose Received in the ED/Outside Facility: N/A  Is Patient on Dialysis or Renal Replacement: No    Ht - 175 cm (68.9\")  Wt - 132 kg (291 lb 0.1 oz)    Estimated Creatinine Clearance: 266.7 mL/min (A) (by C-G formula based on SCr of 0.53 mg/dL (L)).    Intake & Output (last 3 days)         01/13 0701 01/14 0700 01/14 0701  01/15 0700 01/15 0701  01/16 0700 01/16 0701  01/17 0700    P.O. 240 1440 720     I.V. (mL/kg) 5.7 (0)       IV Piggyback  500      Total Intake(mL/kg) 245.7 (1.9) 1940 (14.7) 720 (5.5)     Urine (mL/kg/hr) 2575 (0.8) 3650 (1.2) 3400 (1.1)     Stool  0 0     Chest Tube 110 210 30     Total Output 2685 3860 3430     Net -2439.4 -1920 -2710             Urine Unmeasured Occurrence  1 x 2 x     Stool Unmeasured Occurrence  0 x 0 x             Microbiology and Radiology  Microbiology Results (last 10 days)       Procedure Component Value - Date/Time    Anaerobic Culture - Tissue, Pleural Cavity [[id number]]  (Normal) Collected: 01/11/24 1142    Lab Status: Preliminary result Specimen: Tissue from Pleural Cavity Updated: 01/16/24 0827     Anaerobic Culture Screening for Anaerobes    Tissue / Bone Culture - Tissue, Pleural Cavity [[id number]] Collected: 01/11/24 1142    Lab Status: Final result Specimen: Tissue from Pleural Cavity Updated: 01/14/24 0655     Tissue Culture No growth at 3 days     Gram Stain Few (2+) WBCs seen      No organisms seen    AFB Culture - Tissue, Pleural Cavity [[id number]] Collected: 01/11/24 1142    Lab Status: Preliminary result Specimen: Tissue from Pleural Cavity Updated: 01/12/24 1351     AFB Stain No acid fast bacilli seen on " concentrated smear    Blood Culture - Blood, Hand, Left [[id number]]  (Normal) Collected: 01/09/24 0525    Lab Status: Final result Specimen: Blood from Hand, Left Updated: 01/14/24 0715     Blood Culture No growth at 5 days    AFB Culture - Body Fluid, Pleural Cavity [[id number]] Collected: 01/08/24 1618    Lab Status: Preliminary result Specimen: Body Fluid from Pleural Cavity Updated: 01/15/24 1715     AFB Culture No AFB isolated at 1 week     AFB Stain No acid fast bacilli seen on concentrated smear    Body Fluid Culture - Body Fluid, Pleural Cavity [[id number]] Collected: 01/08/24 1618    Lab Status: Final result Specimen: Body Fluid from Pleural Cavity Updated: 01/11/24 0817     Body Fluid Culture No growth at 3 days     Gram Stain Few (2+) WBCs seen      Few (2+) Red blood cells      No organisms seen    Anaerobic Culture - Pleural Fluid, Pleural Cavity [[id number]]  (Normal) Collected: 01/08/24 1618    Lab Status: Final result Specimen: Pleural Fluid from Pleural Cavity Updated: 01/13/24 0952     Anaerobic Culture No anaerobes isolated at 5 days    Fungus Culture - Body Fluid, Pleural Cavity [[id number]] Collected: 01/08/24 1618    Lab Status: Preliminary result Specimen: Body Fluid from Pleural Cavity Updated: 01/15/24 1715     Fungus Culture No fungus isolated at 1 week    Fungus Smear - Body Fluid, Pleural Cavity [[id number]] Collected: 01/08/24 1618    Lab Status: Final result Specimen: Body Fluid from Pleural Cavity Updated: 01/09/24 1335     Fungal Stain No fungal elements seen    MRSA Screen, PCR (Inpatient) - Swab, Nares [[id number]]  (Abnormal) Collected: 01/08/24 1450    Lab Status: Final result Specimen: Swab from Nares Updated: 01/08/24 1650     MRSA PCR Positive    Narrative:      The negative predictive value of this diagnostic test is high and should only be used to consider de-escalating anti-MRSA therapy. A positive result may indicate colonization with MRSA and must be correlated clinically.     Blood Culture - Blood, Arm, Right [[id number]]  (Normal) Collected: 01/08/24 1434    Lab Status: Final result Specimen: Blood from Arm, Right Updated: 01/13/24 1501     Blood Culture No growth at 5 days    Narrative:      Aerobic Bottle Only    Less than seven (7) mL's of blood was collected.  Insufficient quantity may yield false negative results.    Blood Culture - Blood, Arm, Left [[id number]]  (Abnormal)  (Susceptibility) Collected: 01/08/24 0830    Lab Status: Edited Result - FINAL Specimen: Blood from Arm, Left Updated: 01/11/24 0921     Blood Culture Staphylococcus epidermidis     Isolated from Anaerobic Bottle     Gram Stain Anaerobic Bottle Gram positive cocci in clusters    Narrative:      Dr Curiel called and requested further workup    Less than seven (7) mL's of blood was collected.  Insufficient quantity may yield false negative results.    Susceptibility        Staphylococcus epidermidis      JUANJO      Oxacillin <=0.25 ug/ml Susceptible      Vancomycin 2 ug/ml Susceptible                           Blood Culture ID, PCR - Blood, Arm, Left [[id number]]  (Abnormal) Collected: 01/08/24 0830    Lab Status: Final result Specimen: Blood from Arm, Left Updated: 01/09/24 0545     BCID, PCR Staph spp, not aureus or lugdunensis. Identification by BCID2 PCR.     BOTTLE TYPE Anaerobic Bottle    Blood Culture - Blood, Arm, Left [[id number]]  (Normal) Collected: 01/08/24 0828    Lab Status: Final result Specimen: Blood from Arm, Left Updated: 01/13/24 0846     Blood Culture No growth at 5 days    Narrative:      Less than seven (7) mL's of blood was collected.  Insufficient quantity may yield false negative results.    COVID PRE-OP / PRE-PROCEDURE SCREENING ORDER (NO ISOLATION) - Swab, Nasopharynx [[id number]]  (Normal) Collected: 01/08/24 0805    Lab Status: Final result Specimen: Swab from Nasopharynx Updated: 01/08/24 0915    Narrative:      The following orders were created for panel order COVID PRE-OP /  PRE-PROCEDURE SCREENING ORDER (NO ISOLATION) - Swab, Nasopharynx.  Procedure                               Abnormality         Status                     ---------                               -----------         ------                     Respiratory Panel PCR w/...[[id number]]  Normal              Final result                 Please view results for these tests on the individual orders.    Respiratory Panel PCR w/COVID-19(SARS-CoV-2) VERONICA/MARCELO/ISIDRO/PAD/COR/JOHN In-House, NP Swab in UTM/VTM, 2 HR TAT - Swab, Nasopharynx [[id number]]  (Normal) Collected: 01/08/24 0805    Lab Status: Final result Specimen: Swab from Nasopharynx Updated: 01/08/24 0915     ADENOVIRUS, PCR Not Detected     Coronavirus 229E Not Detected     Coronavirus HKU1 Not Detected     Coronavirus NL63 Not Detected     Coronavirus OC43 Not Detected     COVID19 Not Detected     Human Metapneumovirus Not Detected     Human Rhinovirus/Enterovirus Not Detected     Influenza A PCR Not Detected     Influenza B PCR Not Detected     Parainfluenza Virus 1 Not Detected     Parainfluenza Virus 2 Not Detected     Parainfluenza Virus 3 Not Detected     Parainfluenza Virus 4 Not Detected     RSV, PCR Not Detected     Bordetella pertussis pcr Not Detected     Bordetella parapertussis PCR Not Detected     Chlamydophila pneumoniae PCR Not Detected     Mycoplasma pneumo by PCR Not Detected    Narrative:      In the setting of a positive respiratory panel with a viral infection PLUS a negative procalcitonin without other underlying concern for bacterial infection, consider observing off antibiotics or discontinuation of antibiotics and continue supportive care. If the respiratory panel is positive for atypical bacterial infection (Bordetella pertussis, Chlamydophila pneumoniae, or Mycoplasma pneumoniae), consider antibiotic de-escalation to target atypical bacterial infection.            Reported Vancomycin Levels    Results from last 7 days   Lab Units 01/16/24  6216  01/15/24  0426 01/13/24  0711 01/11/24  0832 01/11/24  0213 01/10/24  0659   VANCOMYCIN TR mcg/mL  --   --   --  4.90*  --  4.80*   VANCOMYCIN RM mcg/mL 24.90 42.00* 13.10  --  16.90  --       [employer] AUC Calculation:    Current AUC:   529 mg/L*hr    Predicted Steady State AUC on Current Dose: 514 mg/L*hr  _________________________________    Predicted Steady State AUC on New Dose: --    Assessment/Plan:    1. Pharmacy to dose vancomycin for sepsis, possible empyema. Goal -600 mg/L*hr.  2. Received loading dose vancomycin 2750 mg IV x1 on 1/8, then maintenance dose initiated.  3. Vancomycin level on 1/16 @ 0634 (~3 hours post-dose) was 24.90 mcg/mL.  4. Continue maintenance dose of vancomycin 1500mg (~11mg/kg) IV Q8hr.  5. Patient is afebrile, SCr stable, UOP wnl, wbc staying at ~13 today.   6. Pharmacy will continue to monitor renal function, cultures and sensitivities, and clinical status to adjust regimen as necessary.    Sergey Bah, [facility]  01/16/24  08:41 EST

## 2024-01-16 NOTE — PROGRESS NOTES
CTS Progress Note       LOS: 7 days   Patient Care Team:  Provider, No Known as PCP - General    Chief Complaint: Loculated pleural effusion    Vital Signs:  Temp:  [97.8 °F (36.6 °C)-98.9 °F (37.2 °C)] 98.6 °F (37 °C)  Heart Rate:  [69-94] 73  Resp:  [14-16] 14  BP: (115-142)/(73-91) 125/80    Physical Exam:       Results:     Results from last 7 days   Lab Units 01/15/24  0426   WBC 10*3/mm3 13.25*   HEMOGLOBIN g/dL 12.4*   HEMATOCRIT % 37.2*   PLATELETS 10*3/mm3 294     Results from last 7 days   Lab Units 01/15/24  0426   SODIUM mmol/L 139   POTASSIUM mmol/L 3.9   CHLORIDE mmol/L 105   CO2 mmol/L 22.0   BUN mg/dL 9   CREATININE mg/dL 0.51*   GLUCOSE mg/dL 103*   CALCIUM mg/dL 8.2*           Imaging Results (Last 24 Hours)       Procedure Component Value Units Date/Time    XR Chest 1 View [[id number]] Resulted: 01/16/24 0030     Updated: 01/16/24 0226    XR Chest 1 View [[id number]] Collected: 01/15/24 0801     Updated: 01/15/24 0805    Narrative:      XR CHEST 1 VW    Date of Exam: 1/15/2024 6:43 AM CST    Indication: postop    Comparison: Chest x-ray 1/13/2024    Findings:  The heart is prominent yet stable in size. There is right lower lung volume loss with elevation of the right hemidiaphragm and segmental atelectasis at the lung base. Mild right pleural effusion is noted unchanged. No evidence for pneumothorax.      Impression:      Impression:    1. Persistent right lower lobe atelectasis and mild pleural effusion.      Electronically Signed: Crystal Navarro MD    1/15/2024 7:02 AM CST    Workstation ID: [id number]            Assessment      Loculated pleural effusion    IVDU (intravenous drug user)    Pleural effusion        Plan   Discontinue remaining chest tube  Bumex 2 mg IV x 1 now  BMP and chest x-ray in the a.m.    Please note that portions of this note were completed with a voice recognition program. Efforts were made to edit the dictations, but occasionally words are mistranscribed.    Douglas EMMANUEL  MD Sam  01/16/24  06:21 EST

## 2024-01-16 NOTE — PROGRESS NOTES
[facility] Medicine Services  PROGRESS NOTE    Patient Name: Lion Solis  : 1990  MRN: [id number]    Date of Admission: 2024  Primary Care Physician: Provider, No Known    Subjective   Subjective     CC:  Chest tube    HPI:  Pt states he is currently comfortable. He is happy to probably be going home tomorrow.  He denies any shortness of breath. Happy to have chest tube out       Objective   Objective     Vital Signs:   Temp:  [96.2 °F (35.7 °C)-98.9 °F (37.2 °C)] 98.4 °F (36.9 °C)  Heart Rate:  [] 103  Resp:  [14-16] 14  BP: (115-142)/(73-92) 139/89     Physical Exam:  Constitutional: No acute distress, awake, alert, obese sitting up in bed   HENT: NCAT, mucous membranes moist  Respiratory: respiratory effort normal   Cardiovascular: RRR,   Gastrointestinal: nondistended  Musculoskeletal: No bilateral ankle edema  Psychiatric: Appropriate affect, cooperative  Neurologic: Oriented x 3,  speech clear  Skin: No rashes        Results Reviewed:  LAB RESULTS:      Lab 01/15/24  0426 24  0700 24  0711 24  0602 24  0213 01/10/24  0659   WBC 13.25* 13.00* 13.21* 16.58* 14.61* 17.71*   HEMOGLOBIN 12.4* 12.0* 11.5* 11.7* 11.7* 11.8*   HEMATOCRIT 37.2* 36.0* 35.0* 35.4* 35.0* 35.4*   PLATELETS 294 315 300 279 212 218   NEUTROS ABS 9.22* 9.29* 9.44* 14.16* 10.88*  --    IMMATURE GRANS (ABS) 0.55* 0.29* 0.23* 0.14* 0.08*  --    LYMPHS ABS 2.35 2.47 2.68 1.38 2.38  --    MONOS ABS 0.81 0.80 0.71 0.87 0.95*  --    EOS ABS 0.23 0.11 0.12 0.00 0.28  --    MCV 83.2 83.9 85.2 83.7 85.8 84.5   CRP  --   --   --   --  28.31*  --    PROCALCITONIN  --   --   --   --  0.37*  --    PROTIME  --   --   --   --   --  15.4*         Lab 24  0634 01/15/24  0426 24  1613 24  0700 24  2034 24  0711 24  0602   SODIUM 138 139  --  133*  --  140 139   POTASSIUM 4.3 3.9 3.7 3.6 3.4* 3.3* 4.0   CHLORIDE 104 105  --  100  --  106 106   CO2 20.0*  22.0  --  23.0  --  25.0 25.0   ANION GAP 14.0 12.0  --  10.0  --  9.0 8.0   BUN 11 9  --  10  --  19 13   CREATININE 0.53* 0.51*  --  0.50*  --  0.53* 0.46*   EGFR 135.7 137.3  --  138.1  --  135.7 141.6   GLUCOSE 97 103*  --  102*  --  87 145*   CALCIUM 8.3* 8.2*  --  8.6  --  8.0* 8.6   MAGNESIUM  --   --   --  1.9  --  2.1  --                Lab 01/10/24  0659   PROTIME 15.4*   INR 1.21*             Lab 01/10/24  0659   ABO TYPING A   RH TYPING Positive   ANTIBODY SCREEN Negative         Brief Urine Lab Results       None            Microbiology Results Abnormal       Procedure Component Value - Date/Time    Anaerobic Culture - Tissue, Pleural Cavity [[id number]]  (Normal) Collected: 01/11/24 1142    Lab Status: Preliminary result Specimen: Tissue from Pleural Cavity Updated: 01/16/24 0827     Anaerobic Culture Screening for Anaerobes    AFB Culture - Body Fluid, Pleural Cavity [[id number]] Collected: 01/08/24 1618    Lab Status: Preliminary result Specimen: Body Fluid from Pleural Cavity Updated: 01/15/24 1715     AFB Culture No AFB isolated at 1 week     AFB Stain No acid fast bacilli seen on concentrated smear    Fungus Culture - Body Fluid, Pleural Cavity [[id number]] Collected: 01/08/24 1618    Lab Status: Preliminary result Specimen: Body Fluid from Pleural Cavity Updated: 01/15/24 1715     Fungus Culture No fungus isolated at 1 week    Blood Culture - Blood, Hand, Left [[id number]]  (Normal) Collected: 01/09/24 0525    Lab Status: Final result Specimen: Blood from Hand, Left Updated: 01/14/24 0715     Blood Culture No growth at 5 days    Tissue / Bone Culture - Tissue, Pleural Cavity [[id number]] Collected: 01/11/24 1142    Lab Status: Final result Specimen: Tissue from Pleural Cavity Updated: 01/14/24 0655     Tissue Culture No growth at 3 days     Gram Stain Few (2+) WBCs seen      No organisms seen    Blood Culture - Blood, Arm, Right [[id number]]  (Normal) Collected: 01/08/24 1434    Lab Status: Final  result Specimen: Blood from Arm, Right Updated: 01/13/24 1501     Blood Culture No growth at 5 days    Narrative:      Aerobic Bottle Only    Less than seven (7) mL's of blood was collected.  Insufficient quantity may yield false negative results.    Anaerobic Culture - Pleural Fluid, Pleural Cavity [[id number]]  (Normal) Collected: 01/08/24 1618    Lab Status: Final result Specimen: Pleural Fluid from Pleural Cavity Updated: 01/13/24 0952     Anaerobic Culture No anaerobes isolated at 5 days    Blood Culture - Blood, Arm, Left [[id number]]  (Normal) Collected: 01/08/24 0828    Lab Status: Final result Specimen: Blood from Arm, Left Updated: 01/13/24 0846     Blood Culture No growth at 5 days    Narrative:      Less than seven (7) mL's of blood was collected.  Insufficient quantity may yield false negative results.    AFB Culture - Tissue, Pleural Cavity [[id number]] Collected: 01/11/24 1142    Lab Status: Preliminary result Specimen: Tissue from Pleural Cavity Updated: 01/12/24 1351     AFB Stain No acid fast bacilli seen on concentrated smear    Body Fluid Culture - Body Fluid, Pleural Cavity [[id number]] Collected: 01/08/24 1618    Lab Status: Final result Specimen: Body Fluid from Pleural Cavity Updated: 01/11/24 0817     Body Fluid Culture No growth at 3 days     Gram Stain Few (2+) WBCs seen      Few (2+) Red blood cells      No organisms seen    Fungus Smear - Body Fluid, Pleural Cavity [[id number]] Collected: 01/08/24 1618    Lab Status: Final result Specimen: Body Fluid from Pleural Cavity Updated: 01/09/24 1335     Fungal Stain No fungal elements seen    COVID PRE-OP / PRE-PROCEDURE SCREENING ORDER (NO ISOLATION) - Swab, Nasopharynx [[id number]]  (Normal) Collected: 01/08/24 0805    Lab Status: Final result Specimen: Swab from Nasopharynx Updated: 01/08/24 0915    Narrative:      The following orders were created for panel order COVID PRE-OP / PRE-PROCEDURE SCREENING ORDER (NO ISOLATION) - Swab,  Nasopharynx.  Procedure                               Abnormality         Status                     ---------                               -----------         ------                     Respiratory Panel PCR w/...[[id number]]  Normal              Final result                 Please view results for these tests on the individual orders.    Respiratory Panel PCR w/COVID-19(SARS-CoV-2) VERONICA/MARCELO/ISIDRO/PAD/COR/JOHN In-House, NP Swab in UTM/VTM, 2 HR TAT - Swab, Nasopharynx [[id number]]  (Normal) Collected: 01/08/24 0805    Lab Status: Final result Specimen: Swab from Nasopharynx Updated: 01/08/24 0915     ADENOVIRUS, PCR Not Detected     Coronavirus 229E Not Detected     Coronavirus HKU1 Not Detected     Coronavirus NL63 Not Detected     Coronavirus OC43 Not Detected     COVID19 Not Detected     Human Metapneumovirus Not Detected     Human Rhinovirus/Enterovirus Not Detected     Influenza A PCR Not Detected     Influenza B PCR Not Detected     Parainfluenza Virus 1 Not Detected     Parainfluenza Virus 2 Not Detected     Parainfluenza Virus 3 Not Detected     Parainfluenza Virus 4 Not Detected     RSV, PCR Not Detected     Bordetella pertussis pcr Not Detected     Bordetella parapertussis PCR Not Detected     Chlamydophila pneumoniae PCR Not Detected     Mycoplasma pneumo by PCR Not Detected    Narrative:      In the setting of a positive respiratory panel with a viral infection PLUS a negative procalcitonin without other underlying concern for bacterial infection, consider observing off antibiotics or discontinuation of antibiotics and continue supportive care. If the respiratory panel is positive for atypical bacterial infection (Bordetella pertussis, Chlamydophila pneumoniae, or Mycoplasma pneumoniae), consider antibiotic de-escalation to target atypical bacterial infection.            XR Chest 1 View    Result Date: 1/16/2024  XR CHEST 1 VW Date of Exam: 1/16/2024 12:29 AM EST Indication: postop Comparison: 1/15/2024.  Findings: There is continued mild right basilar atelectasis. There may be an associated small effusion. Heart size is normal. Right chest tube is unchanged in position. There is no identifiable pneumothorax.     Impression: Impression: No significant change since 1 day prior. Electronically Signed: Reyna Caldwell MD  1/16/2024 8:03 AM EST  Workstation ID: [id number]    XR Chest 1 View    Result Date: 1/15/2024  XR CHEST 1 VW Date of Exam: 1/15/2024 6:43 AM CST Indication: postop Comparison: Chest x-ray 1/13/2024 Findings: The heart is prominent yet stable in size. There is right lower lung volume loss with elevation of the right hemidiaphragm and segmental atelectasis at the lung base. Mild right pleural effusion is noted unchanged. No evidence for pneumothorax.     Impression: Impression: 1. Persistent right lower lobe atelectasis and mild pleural effusion. Electronically Signed: Crystal Navarro MD  1/15/2024 7:02 AM CST  Workstation ID: [id number]     Results for orders placed during the hospital encounter of 01/08/24    Adult Transthoracic Echo Complete w/ Color, Spectral and Contrast if Necessary Per Protocol    Interpretation Summary    Left ventricular systolic function is normal. Calculated left ventricular EF = 57.8% Left ventricular ejection fraction appears to be 56 - 60%.    Estimated right ventricular systolic pressure from tricuspid regurgitation is normal (<35 mmHg).    Mild MR      Current medications:  Scheduled Meds:acetaminophen, 1,000 mg, Oral, Q8H  cefTRIAXone, 2,000 mg, Intravenous, Q24H  [Held by provider] enoxaparin, 40 mg, Subcutaneous, Q24H  gabapentin, 100 mg, Oral, TID  methadone, 90 mg, Oral, Daily  metroNIDAZOLE, 500 mg, Oral, Q8H  pantoprazole, 40 mg, Oral, Q AM  polyethylene glycol, 17 g, Oral, Daily  Scopolamine, 1 patch, Transdermal, Q72H  senna-docusate sodium, 2 tablet, Oral, BID  sodium chloride, 10 mL, Intravenous, Q12H  vancomycin, 1,500 mg, Intravenous, Q8H      Continuous  Infusions:Pharmacy to dose vancomycin,       PRN Meds:.  acetaminophen **OR** acetaminophen **OR** acetaminophen    bisacodyl    bisacodyl    Calcium Replacement - Follow Nurse / BPA Driven Protocol    HYDROmorphone    hydrOXYzine    Magnesium Standard Dose Replacement - Follow Nurse / BPA Driven Protocol    melatonin    nicotine    nitroglycerin    ondansetron ODT **OR** ondansetron    oxyCODONE    Pharmacy to dose vancomycin    Phosphorus Replacement - Follow Nurse / BPA Driven Protocol    polyethylene glycol    Potassium Replacement - Follow Nurse / BPA Driven Protocol    sodium chloride    sodium chloride    Assessment & Plan   Assessment & Plan     Active Hospital Problems    Diagnosis  POA    **Loculated pleural effusion [J90]  Yes    Pleural effusion [J90]  Yes    IVDU (intravenous drug user) [F19.90]  Yes      Resolved Hospital Problems   No resolved problems to display.        Brief Hospital Course to date:  Lion Solis is a 33 y.o. male with history of IV drug use on chronic methadone, vaping presented with right-sided chest pain and shortness of breath.  Patient was recently diagnosed with an upper respiratory infection and seen at an urgent treatment center 5 days prior to admission.  At that time the chest pain was felt to be related to a muscle strain and he was given steroids and naproxen.  He then became febrile and presented to the ED.   In the ED had an elevated white blood cell count.  CT of the chest showed a loculated right pleural effusion with compressive atelectasis of the right middle lobe and right lower lobe.  Thoracentesis was performed and patient had a exudative effusion.  Gram stain was negative.  Blood cultures grew coag negative staph which is thought to be a contaminant.  HIV was negative.  Infectious disease and CT surgery were consulted.  Patient was taken to the OR on 1/11 by Dr. Vargas and underwent right-sided decortication and pleural fluid drainage.  Post procedurally  patient was admitted to the ICU.  Transfer to telemetry on 1/14/2024.     Loculated right pleural effusion  -Likely complicated parapneumonic effusion  -Currently on room air. White blood cell count trending down  -Underwent right decortication by Dr. Vargas on 1/11/2023 with cultures.  -Cultures have remained negative.  -Pain control challenging given history of high-dose methadone.   - chest tubes are out   -ID following.  Continue vancomycin, Rocephin and Flagyl- will go home on po abx      History of IV drug use  -Continue home methadone     Hyponatremia, resolved          Expected Discharge Location and Transportation: home    Expected Discharge   Expected Discharge Date: 1/17/2024; Expected Discharge Time:      DVT prophylaxis:  Medical and mechanical DVT prophylaxis orders are present.     AM-PAC 6 Clicks Score (PT): 22 (01/15/24 2010)    CODE STATUS:   Code Status and Medical Interventions:   Ordered at: 01/11/24 1253     Code Status (Patient has no pulse and is not breathing):    CPR (Attempt to Resuscitate)     Medical Interventions (Patient has pulse or is breathing):    Full Support     I have prepared this progress note with copied portions of the prior day's progress note of my own authorship to preserve accuracy and maintain consistency of documentation. I have reviewed these portions and edited them for correctness. I verify that the above documentation accurately and truly represents the evaluation and management performed on today's date.       Erna Escobar, DO  01/16/24

## 2024-01-16 NOTE — DISCHARGE PLACEMENT REQUEST
"Lambert Reddy (33 y.o. Male)   Bravo Escoto, RN Case Manager  [phone]      Date of Birth   1990    Social Security Number       Address   [address] [place] [address]    Home Phone   [phone]    MRN   [id number]       Judaism   None    Marital Status                               Admission Date   24    Admission Type   Emergency    Admitting Provider   Erna Escobar DO    Attending Provider   Erna Escobar DO    Department, Room/Bed   [address], S473/1       Discharge Date       Discharge Disposition       Discharge Destination                                 Attending Provider: Erna Escobar DO    Allergies: No Known Allergies    Isolation: None   Infection: MRSA (24)   Code Status: CPR    Ht: 175 cm (68.9\")   Wt: 132 kg (291 lb 0.1 oz)    Admission Cmt: None   Principal Problem: Loculated pleural effusion [J90]                   Active Insurance as of 2024       Primary Coverage       Payor Plan Insurance Group Employer/Plan Group    [employer] [place] [employer] [place]ICA[place]        Payor Plan Address Payor Plan Phone Number Payor Plan Fax Number Effective Dates    [address] [address] [phone]  2019 - None Entered    [facility] [id number]         Subscriber Name Subscriber Birth Date Member ID       LAMBERT REDDY 1990 [id number]                     Emergency Contacts        (Rel.) Home Phone Work Phone Mobile Phone    Alondra Reddy (Spouse) -- -- [phone]        [address]  [address] [facility] [address]  Phone:  [phone]  Fax:  [phone]        Patient: ROOM: S473-1   Lambert Reddy MRN:  [id number]   [address] [place] [address] :  1990  SSN:    Phone: [phone] Sex:  M   PCP: Provider, No Known                Emergency Contact Information      Name Relation Home Work Mobile     Alondra Reddy Spouse   "   [phone]          INSURANCE PAYOR PLAN GROUP # SUBSCRIBER ID   Primary:    [facility] [id number]   [id number]   Admitting Diagnosis: Fever [R50.9]  Order Date:  Michael 15, 2024        Case Management  Consult       (Order ID: [id number])     Diagnosis:         Priority:  Routine Expected Date:   Expiration Date:        Interval:  Once Count:    Comments: If the patient continues to improve and his chest tube is removed and he is cleared by CT surgery the we could consider discharge soon with the below antibiotic plan.     UM/JUAN PABLO:  1.            Cefuroxime 500 mg PO BID x 2 weeks  2.            Flagyl 500 mg PO TID x 2 weeks  3.            Bactrim DS PO BID x 2 weeks  4.            Follow up in my clinic in 2 weeks  5.            Please fax a copy of my orders to [facility] at [phone] and call [phone] with final discharge plans  Reason for Consult: antibiotic plan        Authorizing Provider:Byron Curiel MD  Authorizing Provider's NPI: [id number]  Order Entered By: Byron Curiel MD 1/15/2024  7:45 PM     Electronically signed by: Byron Curiel MD 1/15/2024  7:45 PM            Physician Progress Notes (last 24 hours)        Douglas Vargas MD at 01/16/24 0621          CTS Progress Note       LOS: 7 days   Patient Care Team:  Provider, No Known as PCP - General    Chief Complaint: Loculated pleural effusion    Vital Signs:  Temp:  [97.8 °F (36.6 °C)-98.9 °F (37.2 °C)] 98.6 °F (37 °C)  Heart Rate:  [69-94] 73  Resp:  [14-16] 14  BP: (115-142)/(73-91) 125/80    Physical Exam:       Results:     Results from last 7 days   Lab Units 01/15/24  0426   WBC 10*3/mm3 13.25*   HEMOGLOBIN g/dL 12.4*   HEMATOCRIT % 37.2*   PLATELETS 10*3/mm3 294     Results from last 7 days   Lab Units 01/15/24  0426   SODIUM mmol/L 139   POTASSIUM mmol/L 3.9   CHLORIDE mmol/L 105   CO2 mmol/L 22.0   BUN mg/dL 9   CREATININE mg/dL 0.51*   GLUCOSE mg/dL 103*   CALCIUM mg/dL 8.2*           Imaging  Results (Last 24 Hours)       Procedure Component Value Units Date/Time    XR Chest 1 View [[id number]] Resulted: 01/16/24 0030     Updated: 01/16/24 0226    XR Chest 1 View [[id number]] Collected: 01/15/24 0801     Updated: 01/15/24 0805    Narrative:      XR CHEST 1 VW    Date of Exam: 1/15/2024 6:43 AM CST    Indication: postop    Comparison: Chest x-ray 1/13/2024    Findings:  The heart is prominent yet stable in size. There is right lower lung volume loss with elevation of the right hemidiaphragm and segmental atelectasis at the lung base. Mild right pleural effusion is noted unchanged. No evidence for pneumothorax.      Impression:      Impression:    1. Persistent right lower lobe atelectasis and mild pleural effusion.      Electronically Signed: Crystal Navarro MD    1/15/2024 7:02 AM CST    Workstation ID: [id number]            Assessment      Loculated pleural effusion    IVDU (intravenous drug user)    Pleural effusion        Plan   Discontinue remaining chest tube  Bumex 2 mg IV x 1 now  BMP and chest x-ray in the a.m.    Please note that portions of this note were completed with a voice recognition program. Efforts were made to edit the dictations, but occasionally words are mistranscribed.    Douglas Vargas MD  01/16/24  06:21 EST        Electronically signed by Douglas Vargas MD at 01/16/24 0622       Byron Curiel MD at 01/15/24 1030              INFECTIOUS DISEASE Progress Note    Lion Solis  1990  [id number]    Date of consult: 1/9/24    Admit date: 1/8/2024    Requesting Provider: Trae Zayas MD   Evaluating physician: Byron Curiel MD  Reason for Consultation:     IVDU, pleural effusion, GPC in blood     Chief Complaint: Chest pain      Subjective   History of present illness:  Lion Solis is a  33 y.o.  Yr old male with obesity, h/o vaping, chronic methadone use, and IV drug use with fentanyl who presented on 1/8 for evaluation of right-sided chest pain. He did  have a recent upper respiratory illness resolved about 10 days prior to arrival.  About 5 days prior to arrival he developed right-sided chest pain was evaluated in urgent treatment center and was noted to have a fever up to 101°F.  He was diagnosed with a muscle strain was given a steroid shot and naproxen.  His pain will progressed to the point where he presented to the ER yesterday due to increased pain.  The pain on the right side of his chest is pleuritic/worse with deep inspiration.  He is not having any sputum production.      Since arrival, T-max has been 101°F.  He has been on 2 L nasal cannula supplemental O2 with SPO2 in the low 90s.  He was initially tachycardic up to 126 bpm but this is improved. White blood cell count was initially 17.97 but is trended up slightly to 18.66. MRSA PCR nares is positive. Urine drug screen was positive for methadone, opiates, and fentanyl. Respiratory viral PCR panel was negative. One blood culture set is positive for coagulase-negative staph with final ID in progress. Repeat blood cultures were sent today. HIV fourth-generation screen was negative. Chest x-ray showed bilateral pleural effusions.  CTA chest showed small loculated right pleural effusion and areas of compressive atelectasis of the right middle and right lower lobes and mild atelectasis of the left lower lobe. Right-sided thoracentesis procedure was done yesterday yielding 360 cc of slightly cloudy fluid. Pleural fluid protein was elevated to 5.9 and LDH was elevated to 809 consistent with an exudate.  Pleural fluid Gram stain showed no organisms and culture is preliminarily no growth to date. A TTE was ordered and pending. The patient has been on IV vancomycin and ceftriaxone by the primary team. ID has been consult for antibiotic recommendations in the setting of the patient's positive blood culture, IV drug abuse, and pleural effusion that is exudative.    Subjective:    1/10/24: Patient is feeling  slightly better today. Did have some fevers up to 100.9°F last night but has been afebrile today.  He is on 2 L nasal cannula Supplemental O2.  Still with a cough but no worse.  Still with some right-sided pleuritic chest pain.  CT surgery has evaluated the patient and plans for thoracotomy with decortication tomorrow.    1/11/24: patient underwent thoracotomy and decortication procedure today. Has right-sided chest tube in place. Did have some fevers yesterday evening but improved today. WBC is trending down. No new complaints today.     1/12/24: the patient is having some right-sided pleuritic chest pain but no worse. Right-sided chest tube remains in place. Fevers have resolved. No n/v/d. No new rashes reported. Surgical cultures are prelim NGTD.    1/15/23: the patient is doing ok today. Right-sided pleuritic chest pain has improved somewhat. 1 of his chest tubes was removed today. No worsening shortness of breath. He does have a cough productive of whitish sputum. No fevers. No n/v/d. No new rashes. Surgical cx are NGTD    Past Medical History:   Diagnosis Date    MRSA infection        Past Surgical History:   Procedure Laterality Date    BRONCHOSCOPY THORACOTOMY Right 1/11/2024    Procedure: THORACOTOMY WITH DECORTICATION;  Surgeon: Douglas Vargas MD;  Location: [facility];  Service: Cardiothoracic;  Laterality: Right;       Pediatric History   Patient Parents    Not on file     Other Topics Concern    Not on file   Social History Narrative    Not on file   The patient uses IV drugs.  He claims to use heroin but he notes that fentanyl was positive and his UDS and it was probably fentanyl.  He does Vape. He is currently unemployed.  He is .    family history is not on file.    No Known Allergies      There is no immunization history on file for this patient.    Medication:    Current Facility-Administered Medications:     acetaminophen (TYLENOL) tablet 650 mg, 650 mg, Oral, Q4H PRN, 650 mg at  01/11/24 0549 **OR** acetaminophen (TYLENOL) 160 MG/5ML oral solution 650 mg, 650 mg, Oral, Q4H PRN, 650 mg at 01/09/24 2001 **OR** acetaminophen (TYLENOL) suppository 650 mg, 650 mg, Rectal, Q4H PRN, Arpit Devi PA-C    acetaminophen (TYLENOL) tablet 1,000 mg, 1,000 mg, Oral, Q8H, Arpit Devi PA-C, 1,000 mg at 01/15/24 1346    bisacodyl (DULCOLAX) EC tablet 5 mg, 5 mg, Oral, Daily PRN, Arpit Devi PA-C    bisacodyl (DULCOLAX) suppository 10 mg, 10 mg, Rectal, Daily PRN, Arpit Devi PA-C    Calcium Replacement - Follow Nurse / BPA Driven Protocol, , Does not apply, PRN, Arpit Devi PA-C    cefTRIAXone (ROCEPHIN) 2,000 mg in sodium chloride 0.9 % 100 mL IVPB, 2,000 mg, Intravenous, Q24H, Byron Curiel MD, Last Rate: 200 mL/hr at 01/15/24 1657, 2,000 mg at 01/15/24 1657    [Held by provider] Enoxaparin Sodium (LOVENOX) syringe 40 mg, 40 mg, Subcutaneous, Q24H, Paul Plummer MD    gabapentin (NEURONTIN) capsule 100 mg, 100 mg, Oral, TID, Arpit Devi PA-C, 100 mg at 01/15/24 1657    HYDROmorphone (DILAUDID) injection 0.5 mg, 0.5 mg, Intravenous, Q2H PRN, Arpit Devi PA-C, 0.5 mg at 01/15/24 0857    hydrOXYzine (ATARAX) tablet 25 mg, 25 mg, Oral, Nightly PRN, Pritesh Watts APRN, 25 mg at 01/14/24 2116    Magnesium Standard Dose Replacement - Follow Nurse / BPA Driven Protocol, , Does not apply, PRN, Arpit Devi PA-C    melatonin tablet 10 mg, 10 mg, Oral, Nightly PRN, Arpit Devi PA-C, 10 mg at 01/13/24 2026    methadone (DOLOPHINE) tablet 90 mg, 90 mg, Oral, Daily, Temitope Barba MD, 90 mg at 01/15/24 0941    metroNIDAZOLE (FLAGYL) tablet 500 mg, 500 mg, Oral, Q8H, Arpit Devi PA-C, 500 mg at 01/15/24 1346    nicotine (NICODERM CQ) 21 MG/24HR patch 1 patch, 1 patch, Transdermal, Daily PRN, Arpit Devi PA-C    nitroglycerin (NITROSTAT) SL tablet 0.4 mg, 0.4 mg, Sublingual, Q5 Min PRN, Arpit Devi PA-C    ondansetron  ODT (ZOFRAN-ODT) disintegrating tablet 4 mg, 4 mg, Oral, Q6H PRN **OR** ondansetron (ZOFRAN) injection 4 mg, 4 mg, Intravenous, Q6H PRN, Arpit Devi PA-C    oxyCODONE (ROXICODONE) immediate release tablet 7.5 mg, 7.5 mg, Oral, Q4H PRN, Arpit Devi PA-C, 7.5 mg at 01/15/24 1550    pantoprazole (PROTONIX) EC tablet 40 mg, 40 mg, Oral, Q AM, Arpit Devi PA-C, 40 mg at 01/15/24 0606    Phosphorus Replacement - Follow Nurse / BPA Driven Protocol, , Does not apply, PRN, Arpit Devi PA-C    polyethylene glycol (MIRALAX) packet 17 g, 17 g, Oral, Daily, Arpit Devi PA-C, 17 g at 01/14/24 0931    polyethylene glycol (MIRALAX) packet 17 g, 17 g, Oral, Daily PRN, Arpit Devi PA-C    Potassium Replacement - Follow Nurse / BPA Driven Protocol, , Does not apply, PRN, Arpit Devi PA-C    scopolamine patch 1 mg/72 hr, 1 patch, Transdermal, Q72H, Temitope Barba MD, 1 patch at 01/14/24 1139    sennosides-docusate (PERICOLACE) 8.6-50 MG per tablet 2 tablet, 2 tablet, Oral, BID, Arpit Devi PA-C, 2 tablet at 01/14/24 0930    sodium chloride 0.9 % flush 10 mL, 10 mL, Intravenous, Q12H, Arpit Devi PA-C, 10 mL at 01/15/24 0943    sodium chloride 0.9 % flush 10 mL, 10 mL, Intravenous, PRN, Arpit Devi PA-C    sodium chloride 0.9 % infusion 40 mL, 40 mL, Intravenous, PRN, Arpit Devi PA-C    vancomycin IVPB 1500 mg in 0.9% NaCl (Premix) 500 mL, 1,500 mg, Intravenous, Q8H, Byron Curiel MD, Last Rate: 333.3 mL/hr at 01/15/24 1934, 1,500 mg at 01/15/24 1934    Please refer to the medical record for a full medication list    Review of Systems:    As above    Physical Exam:   Vital Signs   Temp:  [97.8 °F (36.6 °C)-98.8 °F (37.1 °C)] 98.8 °F (37.1 °C)  Heart Rate:  [64-94] 71  Resp:  [16] 16  BP: (118-142)/(74-91) 142/91    Temp  Min: 97.8 °F (36.6 °C)  Max: 98.8 °F (37.1 °C)  BP  Min: 118/74  Max: 142/91  Pulse  Min: 64  Max: 94  Resp  Min: 16  Max: 16  SpO2  " Min: 91 %  Max: 95 %    Blood pressure 142/91, pulse 71, temperature 98.8 °F (37.1 °C), temperature source Oral, resp. rate 16, height 175 cm (68.9\"), weight 132 kg (291 lb 0.1 oz), SpO2 91%.  GENERAL: awake. Ill appearing. Morbidly obese.  HEENT:  Normocephalic, atraumatic.  No external oral lesions noted  EYES: No conjunctival injection. No icterus.   HEART: RRR, no murmur  LUNGS: Diminished breath sounds in the right lung base. Nonlabored breathing nasal cannula. One of his right-sided chest tubes has been removed and the other one remains in place  ABDOMEN: Nondistended  GENITAL: no Talavera catheter  SKIN: no generalized rashes. Does have multiple tattoos over his body including his torso.  PSYCHIATRIC: cooperative.  Appropriate mood and affect  EXT:  No cellulitic change.  NEURO: awake and alert. Oriented x4.  Normal speech and cognition    PIV site is without erythema    Results Review:   I reviewed the patient's new clinical results.  I reviewed the patient's new imaging results and agree with the interpretation.  I reviewed the patient's other test results and agree with the interpretation    Results from last 7 days   Lab Units 01/15/24  0426 01/14/24  0700 01/13/24  0711   WBC 10*3/mm3 13.25* 13.00* 13.21*   HEMOGLOBIN g/dL 12.4* 12.0* 11.5*   HEMATOCRIT % 37.2* 36.0* 35.0*   PLATELETS 10*3/mm3 294 315 300     Results from last 7 days   Lab Units 01/15/24  0426   SODIUM mmol/L 139   POTASSIUM mmol/L 3.9   CHLORIDE mmol/L 105   CO2 mmol/L 22.0   BUN mg/dL 9   CREATININE mg/dL 0.51*   GLUCOSE mg/dL 103*   CALCIUM mg/dL 8.2*     Results from last 7 days   Lab Units 01/09/24  0509   ALK PHOS U/L 117   BILIRUBIN mg/dL 1.0   ALT (SGPT) U/L 52*   AST (SGOT) U/L 22         Results from last 7 days   Lab Units 01/11/24  0213   CRP mg/dL 28.31*     Results from last 7 days   Lab Units 01/13/24  0711 01/11/24  0832 01/11/24  0213 01/10/24  0659   VANCOMYCIN TR mcg/mL  --  4.90*  --  4.80*   VANCOMYCIN RM mcg/mL 13.10  " "--  16.90  --            Estimated Creatinine Clearance: 277.1 mL/min (A) (by C-G formula based on SCr of 0.51 mg/dL (L)).     Procalitonin Results:      Lab 01/11/24  0213   PROCALCITONIN 0.37*      Brief Urine Lab Results       None           No results found for: \"SITE\", \"ALLENTEST\", \"PHART\", \"AUV9AON\", \"PO2ART\", \"XBR2OZV\", \"BASEEXCESS\", \"Q0FNYFXY\", \"HGBBG\", \"HCTABG\", \"OXYHEMOGLOBI\", \"METHHGBN\", \"CARBOXYHGB\", \"CO2CT\", \"BAROMETRIC\", \"MODALITY\", \"FIO2\"     Microbiology:  Blood Culture   Date Value Ref Range Status   01/08/2024 Abnormal Stain (C)  Preliminary     BCID, PCR   Date Value Ref Range Status   01/08/2024 (A) Negative by BCID PCR. Culture to Follow. Final    Staph spp, not aureus or lugdunensis. Identification by BCID2 PCR.       Blood Culture   Date Value Ref Range Status   01/08/2024 Abnormal Stain (C)  Preliminary     Body Fluid Culture   Date Value Ref Range Status   01/08/2024 No growth  Preliminary     BCID, PCR   Date Value Ref Range Status   01/08/2024 (A) Negative by BCID PCR. Culture to Follow. Final    Staph spp, not aureus or lugdunensis. Identification by BCID2 PCR.   (      Radiology:  Imaging Results (Last 72 Hours)       Procedure Component Value Units Date/Time    XR Chest 1 View [[id number]] Collected: 01/15/24 0801     Updated: 01/15/24 0805    Narrative:      XR CHEST 1 VW    Date of Exam: 1/15/2024 6:43 AM CST    Indication: postop    Comparison: Chest x-ray 1/13/2024    Findings:  The heart is prominent yet stable in size. There is right lower lung volume loss with elevation of the right hemidiaphragm and segmental atelectasis at the lung base. Mild right pleural effusion is noted unchanged. No evidence for pneumothorax.      Impression:      Impression:    1. Persistent right lower lobe atelectasis and mild pleural effusion.      Electronically Signed: Crystal Navarro MD    1/15/2024 7:02 AM [facility]    Workstation ID: [id number]    XR Chest 1 View [[id number]] Collected: 01/13/24 0923     " Updated: 01/13/24 0928    Narrative:      XR CHEST 1 VW    Date of Exam: 1/13/2024 2:25 AM EST    Indication: PNEUMOTHORAX  Postop    Comparison:  1/12/2024    Findings:  2 right-sided chest tubes. Cardiac size is similar to prior exam. Mildly increased linear atelectasis in the right basilar lung. Small right layering pleural effusion. No substantial pneumothorax. No evidence of acute osseous abnormalities. Visualized   upper abdomen is unremarkable.      Impression:      Impression:  Mildly increased linear atelectasis in the right basal lung.    Small right layering pleural effusion.    No substantial pneumothorax seen.      Electronically Signed: Phoenix Cook MD    1/13/2024 9:25 AM EST    Workstation ID: [id number]        I read the patient's chest xray from today- persistent right lower lobe air    space disease and mild pleural effusion with chest tube in place    IMPRESSION:     Problems:  Sepsis with fevers, tachycardia, and leukocytosis-Suspect possibly due to an empyema on the right side.  1 blood culture set was positive for staph epidermidis which likely represents a contaminate. TTE was negative for vegetations. Now s/p thoracotomy and decortication procedure on 1/11. Improving  Right-sided Exudative pleural effusion, possible empyema   Staph epidermidis in 1 blood culture set so far-likely a contaminate  IV drug abuse  MRSA nasal colonization    RECOMMENDATIONS:    -Continue to follow CBC and BMP  -TTE was without any vegetations  -Continue ceftriaxone 2 g IV every 24 hours and IV vancomycin with pharmacy dosing assistance for now.  -Continue flagyl 500 mg PO TID for some anaerobic coverage for possible empyema  -Status post thoracotomy and decortication procedure on 1/11. I will follow cultures from this procedure, currently NGTD  -repeat chest xray ordered by CT surgery for the AM    The patient is not a good candidate for outpatient IV antibiotics with a PICC line in the setting of his recent IV  drug abuse.      If the patient continues to improve and his chest tube is removed and he is cleared by CT surgery the we could consider discharge soon with the below antibiotic plan.    UM/JUAN PABLO:  Cefuroxime 500 mg PO BID x 2 weeks  Flagyl 500 mg PO TID x 2 weeks  Bactrim DS PO BID x 2 weeks  Follow up in my clinic in 2 weeks  Please fax a copy of my orders to [facility] at [phone] and call [phone] with final discharge plans       I discussed with the patient and his wife at bedside today      Thank you for asking me to see Lion Solis.      Complex MDM    Byron Curiel MD  1/15/2024     Electronically signed by Byron Curiel MD at 01/15/24 9805

## 2024-01-17 ENCOUNTER — READMISSION MANAGEMENT (OUTPATIENT)
Dept: CALL CENTER | Facility: HOSPITAL | Age: 34
End: 2024-01-17
Payer: COMMERCIAL

## 2024-01-17 ENCOUNTER — APPOINTMENT (OUTPATIENT)
Dept: GENERAL RADIOLOGY | Facility: HOSPITAL | Age: 34
End: 2024-01-17
Payer: COMMERCIAL

## 2024-01-17 VITALS
HEART RATE: 113 BPM | RESPIRATION RATE: 16 BRPM | BODY MASS INDEX: 43.1 KG/M2 | SYSTOLIC BLOOD PRESSURE: 133 MMHG | WEIGHT: 291.01 LBS | OXYGEN SATURATION: 92 % | DIASTOLIC BLOOD PRESSURE: 90 MMHG | HEIGHT: 69 IN | TEMPERATURE: 98.5 F

## 2024-01-17 PROBLEM — F11.20 OPIOID DEPENDENCE ON AGONIST THERAPY: Status: ACTIVE | Noted: 2024-01-17

## 2024-01-17 LAB
ANION GAP SERPL CALCULATED.3IONS-SCNC: 11 MMOL/L (ref 5–15)
BUN SERPL-MCNC: 15 MG/DL (ref 6–20)
BUN/CREAT SERPL: 25 (ref 7–25)
CALCIUM SPEC-SCNC: 8.3 MG/DL (ref 8.6–10.5)
CHLORIDE SERPL-SCNC: 105 MMOL/L (ref 98–107)
CO2 SERPL-SCNC: 22 MMOL/L (ref 22–29)
CREAT SERPL-MCNC: 0.6 MG/DL (ref 0.76–1.27)
DEPRECATED RDW RBC AUTO: 41.3 FL (ref 37–54)
EGFRCR SERPLBLD CKD-EPI 2021: 130.7 ML/MIN/1.73
ERYTHROCYTE [DISTWIDTH] IN BLOOD BY AUTOMATED COUNT: 13.2 % (ref 12.3–15.4)
GLUCOSE SERPL-MCNC: 114 MG/DL (ref 65–99)
HCT VFR BLD AUTO: 39.4 % (ref 37.5–51)
HGB BLD-MCNC: 12.9 G/DL (ref 13–17.7)
MCH RBC QN AUTO: 28.3 PG (ref 26.6–33)
MCHC RBC AUTO-ENTMCNC: 32.7 G/DL (ref 31.5–35.7)
MCV RBC AUTO: 86.4 FL (ref 79–97)
PLATELET # BLD AUTO: 303 10*3/MM3 (ref 140–450)
PMV BLD AUTO: 9.5 FL (ref 6–12)
POTASSIUM SERPL-SCNC: 3.9 MMOL/L (ref 3.5–5.2)
RBC # BLD AUTO: 4.56 10*6/MM3 (ref 4.14–5.8)
SODIUM SERPL-SCNC: 138 MMOL/L (ref 136–145)
WBC NRBC COR # BLD AUTO: 13.02 10*3/MM3 (ref 3.4–10.8)

## 2024-01-17 PROCEDURE — 71045 X-RAY EXAM CHEST 1 VIEW: CPT

## 2024-01-17 PROCEDURE — 99024 POSTOP FOLLOW-UP VISIT: CPT | Performed by: THORACIC SURGERY (CARDIOTHORACIC VASCULAR SURGERY)

## 2024-01-17 PROCEDURE — 25810000003 SODIUM CHLORIDE 0.9 % SOLUTION: Performed by: INTERNAL MEDICINE

## 2024-01-17 PROCEDURE — 25010000002 VANCOMYCIN 10 G RECONSTITUTED SOLUTION: Performed by: INTERNAL MEDICINE

## 2024-01-17 PROCEDURE — 80048 BASIC METABOLIC PNL TOTAL CA: CPT

## 2024-01-17 PROCEDURE — 25010000002 HYDROMORPHONE PER 4 MG: Performed by: PHYSICIAN ASSISTANT

## 2024-01-17 PROCEDURE — 85027 COMPLETE CBC AUTOMATED: CPT

## 2024-01-17 PROCEDURE — 25010000002 FUROSEMIDE PER 20 MG

## 2024-01-17 PROCEDURE — 99238 HOSP IP/OBS DSCHRG MGMT 30/<: CPT | Performed by: INTERNAL MEDICINE

## 2024-01-17 RX ORDER — SULFAMETHOXAZOLE AND TRIMETHOPRIM 800; 160 MG/1; MG/1
1 TABLET ORAL 2 TIMES DAILY
Qty: 28 TABLET | Refills: 0 | Status: SHIPPED | OUTPATIENT
Start: 2024-01-17 | End: 2024-01-31

## 2024-01-17 RX ORDER — CYCLOBENZAPRINE HCL 5 MG
10 TABLET ORAL 3 TIMES DAILY PRN
Qty: 30 TABLET | Refills: 0 | Status: SHIPPED | OUTPATIENT
Start: 2024-01-17

## 2024-01-17 RX ORDER — CHOLECALCIFEROL (VITAMIN D3) 125 MCG
10 CAPSULE ORAL NIGHTLY PRN
Qty: 30 TABLET | Refills: 1 | Status: SHIPPED | OUTPATIENT
Start: 2024-01-17

## 2024-01-17 RX ORDER — GABAPENTIN 100 MG/1
CAPSULE ORAL
Qty: 39 CAPSULE | Refills: 0 | Status: SHIPPED | OUTPATIENT
Start: 2024-01-17 | End: 2024-01-26

## 2024-01-17 RX ORDER — METRONIDAZOLE 500 MG/1
500 TABLET ORAL EVERY 8 HOURS SCHEDULED
Qty: 42 TABLET | Refills: 0 | Status: SHIPPED | OUTPATIENT
Start: 2024-01-17 | End: 2024-01-31

## 2024-01-17 RX ORDER — NAPROXEN 500 MG/1
500 TABLET ORAL 2 TIMES DAILY WITH MEALS
Qty: 28 TABLET | Refills: 0 | Status: SHIPPED | OUTPATIENT
Start: 2024-01-17 | End: 2024-01-31

## 2024-01-17 RX ORDER — CEFUROXIME AXETIL 500 MG/1
500 TABLET ORAL 2 TIMES DAILY
Qty: 28 TABLET | Refills: 0 | Status: SHIPPED | OUTPATIENT
Start: 2024-01-17 | End: 2024-01-31

## 2024-01-17 RX ORDER — AMITRIPTYLINE HYDROCHLORIDE 25 MG/1
25 TABLET, FILM COATED ORAL NIGHTLY
Qty: 30 TABLET | Refills: 1 | Status: SHIPPED | OUTPATIENT
Start: 2024-01-17

## 2024-01-17 RX ORDER — ACETAMINOPHEN 500 MG
1000 TABLET ORAL EVERY 8 HOURS SCHEDULED
Start: 2024-01-17

## 2024-01-17 RX ORDER — FUROSEMIDE 10 MG/ML
60 INJECTION INTRAMUSCULAR; INTRAVENOUS ONCE
Status: COMPLETED | OUTPATIENT
Start: 2024-01-17 | End: 2024-01-17

## 2024-01-17 RX ORDER — HYDROXYZINE HYDROCHLORIDE 25 MG/1
25 TABLET, FILM COATED ORAL EVERY 8 HOURS PRN
Qty: 90 TABLET | Refills: 1 | Status: SHIPPED | OUTPATIENT
Start: 2024-01-17

## 2024-01-17 RX ORDER — LOPERAMIDE HYDROCHLORIDE 2 MG/1
2 TABLET ORAL 4 TIMES DAILY PRN
Qty: 24 TABLET | Refills: 0 | Status: SHIPPED | OUTPATIENT
Start: 2024-01-17

## 2024-01-17 RX ADMIN — Medication 10 ML: at 09:22

## 2024-01-17 RX ADMIN — METRONIDAZOLE 500 MG: 500 TABLET ORAL at 13:02

## 2024-01-17 RX ADMIN — FUROSEMIDE 60 MG: 10 INJECTION, SOLUTION INTRAMUSCULAR; INTRAVENOUS at 09:21

## 2024-01-17 RX ADMIN — ACETAMINOPHEN 1000 MG: 500 TABLET, FILM COATED ORAL at 13:02

## 2024-01-17 RX ADMIN — VANCOMYCIN HYDROCHLORIDE 1500 MG: 10 INJECTION, POWDER, LYOPHILIZED, FOR SOLUTION INTRAVENOUS at 11:59

## 2024-01-17 RX ADMIN — ACETAMINOPHEN 1000 MG: 500 TABLET, FILM COATED ORAL at 05:30

## 2024-01-17 RX ADMIN — OXYCODONE HYDROCHLORIDE 7.5 MG: 15 TABLET ORAL at 09:21

## 2024-01-17 RX ADMIN — VANCOMYCIN HYDROCHLORIDE 1500 MG: 10 INJECTION, POWDER, LYOPHILIZED, FOR SOLUTION INTRAVENOUS at 03:13

## 2024-01-17 RX ADMIN — METRONIDAZOLE 500 MG: 500 TABLET ORAL at 05:30

## 2024-01-17 RX ADMIN — GABAPENTIN 100 MG: 100 CAPSULE ORAL at 09:21

## 2024-01-17 RX ADMIN — PANTOPRAZOLE SODIUM 40 MG: 40 TABLET, DELAYED RELEASE ORAL at 05:30

## 2024-01-17 RX ADMIN — METHADONE HYDROCHLORIDE 90 MG: 10 TABLET ORAL at 09:22

## 2024-01-17 RX ADMIN — HYDROMORPHONE HYDROCHLORIDE 0.5 MG: 1 INJECTION, SOLUTION INTRAMUSCULAR; INTRAVENOUS; SUBCUTANEOUS at 10:33

## 2024-01-17 NOTE — PROGRESS NOTES
CTS Progress Note       LOS: 8 days   Patient Care Team:  Provider, No Known as PCP - General    Chief Complaint: Loculated pleural effusion    Vital Signs:  Temp:  [96.2 °F (35.7 °C)-98.4 °F (36.9 °C)] 98.4 °F (36.9 °C)  Heart Rate:  [] 95  BP: (122-139)/(89-92) 139/89    Physical Exam:       Results:     Results from last 7 days   Lab Units 01/15/24  0426   WBC 10*3/mm3 13.25*   HEMOGLOBIN g/dL 12.4*   HEMATOCRIT % 37.2*   PLATELETS 10*3/mm3 294     Results from last 7 days   Lab Units 01/16/24  0634   SODIUM mmol/L 138   POTASSIUM mmol/L 4.3   CHLORIDE mmol/L 104   CO2 mmol/L 20.0*   BUN mg/dL 11   CREATININE mg/dL 0.53*   GLUCOSE mg/dL 97   CALCIUM mg/dL 8.3*           Imaging Results (Last 24 Hours)       Procedure Component Value Units Date/Time    XR Chest 1 View [[id number]] Resulted: 01/17/24 0144     Updated: 01/17/24 0324    XR Chest 1 View [[id number]] Collected: 01/16/24 0802     Updated: 01/16/24 0807    Narrative:      XR CHEST 1 VW    Date of Exam: 1/16/2024 12:29 AM EST    Indication: postop    Comparison: 1/15/2024.    Findings:  There is continued mild right basilar atelectasis. There may be an associated small effusion. Heart size is normal. Right chest tube is unchanged in position. There is no identifiable pneumothorax.      Impression:      Impression:  No significant change since 1 day prior.      Electronically Signed: Reyna Caldwell MD    1/16/2024 8:03 AM EST    Workstation ID: [id number]            Assessment      Loculated pleural effusion    IVDU (intravenous drug user)    Pleural effusion    Cxr today is satisfactory  Rec 1 more dose lasix     Plan   Lasix 60mg IV now  BMP in am  Followup my office 2 weeks for wound check    Please note that portions of this note were completed with a voice recognition program. Efforts were made to edit the dictations, but occasionally words are mistranscribed.    Douglas Vargas MD  01/17/24  04:20 EST

## 2024-01-17 NOTE — CASE MANAGEMENT/SOCIAL WORK
Continued Stay Note  [facility]     Patient Name: Lion Solis  MRN: [id number]  Today's Date: 1/17/2024    Admit Date: 1/8/2024    Plan: Home   Discharge Plan       Row Name 01/17/24 0909       Plan    Plan Home    Patient/Family in Agreement with Plan yes    Plan Comments Spoke with patient at bedside. Plan is home with family. Plan is for PO antibiotics at discharge. Patient denies any discharge needs at this time. CM will continue to follow.    Final Discharge Disposition Code 01 - home or self-care                   Discharge Codes    No documentation.                 Expected Discharge Date and Time       Expected Discharge Date Expected Discharge Time    Jan 17, 2024               Arpit Escoto RN

## 2024-01-17 NOTE — DISCHARGE SUMMARY
[facility] Medicine Services  DISCHARGE SUMMARY    Patient Name: Lion Solis  : 1990  MRN: [id number]    Date of Admission: 2024  7:41 AM  Date of Discharge:  2024  Primary Care Physician: Provider, No Known    Consults       Date and Time Order Name Status Description    2024 11:43 AM Inpatient Cardiothoracic Surgery Consult Completed     2024 10:42 AM Inpatient Infectious Diseases Consult Completed     2024  5:06 AM Inpatient Infectious Diseases Consult Completed             Hospital Course     Presenting Problem: Chest pain    Active Hospital Problems    Diagnosis  POA    **Loculated pleural effusion [J90]  Yes    Opioid dependence on agonist therapy [F11.20]  Yes    Pleural effusion [J90]  Yes    IVDU (intravenous drug user) [F19.90]  Yes      Resolved Hospital Problems   No resolved problems to display.          Hospital Course:  Lion Solis is a 33 y.o. male with IV drug use (Fentanyl) on chronic methadone, vaping presented with right-sided chest pain and shortness of breath.  Patient was recently diagnosed with an upper respiratory infection and seen at an urgent treatment center 5 days prior to admission.  At that time the chest pain was felt to be related to a muscle strain and he was given steroids and naproxen.  He then became febrile and presented to the ED.   In the ED had an elevated white blood cell count.  CT of the chest showed a loculated right pleural effusion with compressive atelectasis of the right middle lobe and right lower lobe.  Thoracentesis was performed and patient had a exudative effusion.  Gram stain was negative.  Blood cultures grew coag negative staph which is thought to be a contaminant.  HIV was negative.  Infectious disease and CT surgery were consulted.  Patient was taken to the OR on  by Dr. Vargas and underwent right-sided decortication and pleural fluid drainage.  Post procedurally patient was admitted to the  ICU and subsequently transferred to telemetry on 1/14/2024.     Loculated right pleural effusion  -Likely complicated parapneumonic effusion  -Underwent right decortication by Dr. Vargas on 1/11/2023.  -ID followed and recommended PO cefuroxime, PO flagyl, and PO bactrim x 2 weeks.  Outpatient follow up with Dr. Byron Curiel in 2 weeks.  -F/U CT surgery clinic 1/31     History of IV drug use  -Continue home methadone  -PRN medications added for withdrawal symptoms, PO gabapentin and naproxen prescribed for pain  -Patient asked to restart Elavil nightly (previously was on this) so I sent a prescription and he will need to establish care with a PCP for titration      Discharge Follow Up Recommendations for outpatient labs/diagnostics:  F/U PCP 1 week to establish care  F/U CT surgery 1/31  F/U ID 2 weeks    Day of Discharge     HPI:   He is still having some pain today.  Mild withdrawal symptoms.  He would like to go home.  Plans to continue with his methadone clinic at discharge.  Oxycodone doesn't have any effect on pain or withdrawal symptoms.      Vital Signs:   Temp:  [98.1 °F (36.7 °C)-98.5 °F (36.9 °C)] 98.5 °F (36.9 °C)  Heart Rate:  [] 113  Resp:  [16] 16  BP: (120-125)/(70-77) 120/77      Physical Exam:  Constitutional: No acute distress, awake, alert, laying in bed  HENT: NCAT, mucous membranes moist  Respiratory: Respiratory effort normal   Musculoskeletal: No bilateral ankle edema  Psychiatric: Appropriate affect, cooperative  Neurologic: Speech clear and fluent  Skin: Multiple tattoos; no rashes    Pertinent  and/or Most Recent Results     LAB RESULTS:      Lab 01/17/24  0809 01/15/24  0426 01/14/24  0700 01/13/24  0711 01/12/24  0602 01/11/24  0213   WBC 13.02* 13.25* 13.00* 13.21* 16.58* 14.61*   HEMOGLOBIN 12.9* 12.4* 12.0* 11.5* 11.7* 11.7*   HEMATOCRIT 39.4 37.2* 36.0* 35.0* 35.4* 35.0*   PLATELETS 303 294 315 300 279 212   NEUTROS ABS  --  9.22* 9.29* 9.44* 14.16* 10.88*   IMMATURE GRANS  (ABS)  --  0.55* 0.29* 0.23* 0.14* 0.08*   LYMPHS ABS  --  2.35 2.47 2.68 1.38 2.38   MONOS ABS  --  0.81 0.80 0.71 0.87 0.95*   EOS ABS  --  0.23 0.11 0.12 0.00 0.28   MCV 86.4 83.2 83.9 85.2 83.7 85.8   CRP  --   --   --   --   --  28.31*   PROCALCITONIN  --   --   --   --   --  0.37*         Lab 01/17/24  0610 01/16/24  0634 01/15/24  0426 01/14/24  1613 01/14/24  0700 01/13/24 2034 01/13/24  0711   SODIUM 138 138 139  --  133*  --  140   POTASSIUM 3.9 4.3 3.9 3.7 3.6   < > 3.3*   CHLORIDE 105 104 105  --  100  --  106   CO2 22.0 20.0* 22.0  --  23.0  --  25.0   ANION GAP 11.0 14.0 12.0  --  10.0  --  9.0   BUN 15 11 9  --  10  --  19   CREATININE 0.60* 0.53* 0.51*  --  0.50*  --  0.53*   EGFR 130.7 135.7 137.3  --  138.1  --  135.7   GLUCOSE 114* 97 103*  --  102*  --  87   CALCIUM 8.3* 8.3* 8.2*  --  8.6  --  8.0*   MAGNESIUM  --   --   --   --  1.9  --  2.1    < > = values in this interval not displayed.                         Brief Urine Lab Results       None          Microbiology Results (last 10 days)       Procedure Component Value - Date/Time    Anaerobic Culture - Tissue, Pleural Cavity [[id number]]  (Normal) Collected: 01/11/24 1142    Lab Status: Preliminary result Specimen: Tissue from Pleural Cavity Updated: 01/16/24 0827     Anaerobic Culture Screening for Anaerobes    Tissue / Bone Culture - Tissue, Pleural Cavity [[id number]] Collected: 01/11/24 1142    Lab Status: Final result Specimen: Tissue from Pleural Cavity Updated: 01/14/24 0655     Tissue Culture No growth at 3 days     Gram Stain Few (2+) WBCs seen      No organisms seen    AFB Culture - Tissue, Pleural Cavity [[id number]] Collected: 01/11/24 1142    Lab Status: Preliminary result Specimen: Tissue from Pleural Cavity Updated: 01/16/24 1415     AFB Culture No AFB isolated at less than 1 week     AFB Stain No acid fast bacilli seen on concentrated smear    Blood Culture - Blood, Hand, Left [[id number]]  (Normal) Collected: 01/09/24 0535     Lab Status: Final result Specimen: Blood from Hand, Left Updated: 01/14/24 0715     Blood Culture No growth at 5 days    AFB Culture - Body Fluid, Pleural Cavity [[id number]] Collected: 01/08/24 1618    Lab Status: Preliminary result Specimen: Body Fluid from Pleural Cavity Updated: 01/15/24 1715     AFB Culture No AFB isolated at 1 week     AFB Stain No acid fast bacilli seen on concentrated smear    Body Fluid Culture - Body Fluid, Pleural Cavity [[id number]] Collected: 01/08/24 1618    Lab Status: Final result Specimen: Body Fluid from Pleural Cavity Updated: 01/11/24 0817     Body Fluid Culture No growth at 3 days     Gram Stain Few (2+) WBCs seen      Few (2+) Red blood cells      No organisms seen    Anaerobic Culture - Pleural Fluid, Pleural Cavity [[id number]]  (Normal) Collected: 01/08/24 1618    Lab Status: Final result Specimen: Pleural Fluid from Pleural Cavity Updated: 01/13/24 0952     Anaerobic Culture No anaerobes isolated at 5 days    Fungus Culture - Body Fluid, Pleural Cavity [[id number]] Collected: 01/08/24 1618    Lab Status: Preliminary result Specimen: Body Fluid from Pleural Cavity Updated: 01/15/24 1715     Fungus Culture No fungus isolated at 1 week    Fungus Smear - Body Fluid, Pleural Cavity [[id number]] Collected: 01/08/24 1618    Lab Status: Final result Specimen: Body Fluid from Pleural Cavity Updated: 01/09/24 1335     Fungal Stain No fungal elements seen    MRSA Screen, PCR (Inpatient) - Swab, Nares [[id number]]  (Abnormal) Collected: 01/08/24 1450    Lab Status: Final result Specimen: Swab from Nares Updated: 01/08/24 1650     MRSA PCR Positive    Narrative:      The negative predictive value of this diagnostic test is high and should only be used to consider de-escalating anti-MRSA therapy. A positive result may indicate colonization with MRSA and must be correlated clinically.    Blood Culture - Blood, Arm, Right [[id number]]  (Normal) Collected: 01/08/24 1434    Lab Status: Final result  Specimen: Blood from Arm, Right Updated: 01/13/24 1501     Blood Culture No growth at 5 days    Narrative:      Aerobic Bottle Only    Less than seven (7) mL's of blood was collected.  Insufficient quantity may yield false negative results.    Blood Culture - Blood, Arm, Left [[id number]]  (Abnormal)  (Susceptibility) Collected: 01/08/24 0830    Lab Status: Edited Result - FINAL Specimen: Blood from Arm, Left Updated: 01/11/24 0921     Blood Culture Staphylococcus epidermidis     Isolated from Anaerobic Bottle     Gram Stain Anaerobic Bottle Gram positive cocci in clusters    Narrative:      Dr Curiel called and requested further workup    Less than seven (7) mL's of blood was collected.  Insufficient quantity may yield false negative results.    Susceptibility        Staphylococcus epidermidis      JUANJO      Oxacillin Susceptible      Vancomycin Susceptible                           Blood Culture ID, PCR - Blood, Arm, Left [[id number]]  (Abnormal) Collected: 01/08/24 0830    Lab Status: Final result Specimen: Blood from Arm, Left Updated: 01/09/24 0545     BCID, PCR Staph spp, not aureus or lugdunensis. Identification by BCID2 PCR.     BOTTLE TYPE Anaerobic Bottle    Blood Culture - Blood, Arm, Left [[id number]]  (Normal) Collected: 01/08/24 0828    Lab Status: Final result Specimen: Blood from Arm, Left Updated: 01/13/24 0846     Blood Culture No growth at 5 days    Narrative:      Less than seven (7) mL's of blood was collected.  Insufficient quantity may yield false negative results.    COVID PRE-OP / PRE-PROCEDURE SCREENING ORDER (NO ISOLATION) - Swab, Nasopharynx [[id number]]  (Normal) Collected: 01/08/24 0805    Lab Status: Final result Specimen: Swab from Nasopharynx Updated: 01/08/24 0915    Narrative:      The following orders were created for panel order COVID PRE-OP / PRE-PROCEDURE SCREENING ORDER (NO ISOLATION) - Swab, Nasopharynx.  Procedure                               Abnormality         Status                      ---------                               -----------         ------                     Respiratory Panel PCR w/...[[id number]]  Normal              Final result                 Please view results for these tests on the individual orders.    Respiratory Panel PCR w/COVID-19(SARS-CoV-2) VERONICA/MARCELO/ISIDRO/PAD/COR/JOHN In-House, NP Swab in UTM/VTM, 2 HR TAT - Swab, Nasopharynx [[id number]]  (Normal) Collected: 01/08/24 0805    Lab Status: Final result Specimen: Swab from Nasopharynx Updated: 01/08/24 0915     ADENOVIRUS, PCR Not Detected     Coronavirus 229E Not Detected     Coronavirus HKU1 Not Detected     Coronavirus NL63 Not Detected     Coronavirus OC43 Not Detected     COVID19 Not Detected     Human Metapneumovirus Not Detected     Human Rhinovirus/Enterovirus Not Detected     Influenza A PCR Not Detected     Influenza B PCR Not Detected     Parainfluenza Virus 1 Not Detected     Parainfluenza Virus 2 Not Detected     Parainfluenza Virus 3 Not Detected     Parainfluenza Virus 4 Not Detected     RSV, PCR Not Detected     Bordetella pertussis pcr Not Detected     Bordetella parapertussis PCR Not Detected     Chlamydophila pneumoniae PCR Not Detected     Mycoplasma pneumo by PCR Not Detected    Narrative:      In the setting of a positive respiratory panel with a viral infection PLUS a negative procalcitonin without other underlying concern for bacterial infection, consider observing off antibiotics or discontinuation of antibiotics and continue supportive care. If the respiratory panel is positive for atypical bacterial infection (Bordetella pertussis, Chlamydophila pneumoniae, or Mycoplasma pneumoniae), consider antibiotic de-escalation to target atypical bacterial infection.            XR Chest 1 View    Result Date: 1/17/2024  XR CHEST 1 VW Date of Exam: 1/17/2024 1:43 AM EST Indication: postop Comparison: 1/16/2024. Findings: Right chest tube has been removed. There is no identifiable right pneumothorax.  There is continued mild atelectasis of the right lower lobe. Heart and pulmonary vessels appear within normal limits.     Impression: No pneumothorax identified after right chest tube removal. Continued mild atelectasis right lung base. Electronically Signed: Reyna Caldwell MD  1/17/2024 7:36 AM EST  Workstation ID: [id number]    XR Chest 1 View    Result Date: 1/16/2024  XR CHEST 1 VW Date of Exam: 1/16/2024 12:29 AM EST Indication: postop Comparison: 1/15/2024. Findings: There is continued mild right basilar atelectasis. There may be an associated small effusion. Heart size is normal. Right chest tube is unchanged in position. There is no identifiable pneumothorax.     Impression: No significant change since 1 day prior. Electronically Signed: Reyna Caldwell MD  1/16/2024 8:03 AM EST  Workstation ID: [id number]    XR Chest 1 View    Result Date: 1/15/2024  XR CHEST 1 VW Date of Exam: 1/15/2024 6:43 AM CST Indication: postop Comparison: Chest x-ray 1/13/2024 Findings: The heart is prominent yet stable in size. There is right lower lung volume loss with elevation of the right hemidiaphragm and segmental atelectasis at the lung base. Mild right pleural effusion is noted unchanged. No evidence for pneumothorax.     Impression: 1. Persistent right lower lobe atelectasis and mild pleural effusion. Electronically Signed: Crystal Navarro MD  1/15/2024 7:02 AM CST  Workstation ID: [id number]    XR Chest 1 View    Result Date: 1/13/2024  XR CHEST 1 VW Date of Exam: 1/13/2024 2:25 AM EST Indication: PNEUMOTHORAX Postop Comparison:  1/12/2024 Findings: 2 right-sided chest tubes. Cardiac size is similar to prior exam. Mildly increased linear atelectasis in the right basilar lung. Small right layering pleural effusion. No substantial pneumothorax. No evidence of acute osseous abnormalities. Visualized upper abdomen is unremarkable.     Impression: Mildly increased linear atelectasis in the right basal lung. Small right layering  pleural effusion. No substantial pneumothorax seen. Electronically Signed: Phoenix Cook MD  1/13/2024 9:25 AM EST  Workstation ID: [id number]    XR Chest 1 View    Result Date: 1/12/2024  XR CHEST 1 VW Date of Exam: 1/12/2024 1:46 AM EST Indication: postop Comparison: 1/11/2024 Findings: Postsurgical changes of the right hemithorax with 2 right-sided chest tubes in similar position to the prior study. Previously seen small right basilar pneumothorax no longer clearly identified. There is improving right basilar atelectasis. Stable cardiomegaly. Left lung clear. No significant effusion.     Impression: 1. Postsurgical changes of the right hemithorax with 2 right-sided chest tubes in place. Previously seen small right basilar pneumothorax no longer clearly identified. 2. Improving right basilar atelectasis. Electronically Signed: Ron Rogers MD  1/12/2024 7:32 AM EST  Workstation ID: [id number]    XR Chest 1 View    Result Date: 1/11/2024  XR CHEST 1 VW Date of Exam: 1/11/2024 12:35 PM CST Indication: postop Comparison: Chest x-ray 1/8/2024 Findings: The heart is prominent yet stable in size. There our right pleural chest tubes with a pneumothorax along the lateral lung base measuring 7 mm. There is right basilar airspace disease and pleural effusion.     Impression: 1. Right basilar airspace disease and pleural effusion. 2. Pneumothorax at the lateral right lung base. Electronically Signed: Crystal Navarro MD  1/11/2024 1:08 PM CST  Workstation ID: [id number]    Adult Transthoracic Echo Complete w/ Color, Spectral and Contrast if Necessary Per Protocol    Result Date: 1/9/2024    Left ventricular systolic function is normal. Calculated left ventricular EF = 57.8% Left ventricular ejection fraction appears to be 56 - 60%.   Estimated right ventricular systolic pressure from tricuspid regurgitation is normal (<35 mmHg).   Mild MR     Duplex Venous Lower Extremity - Bilateral CAR    Result Date: 1/9/2024    Normal  bilateral lower extremity venous duplex scan.     US Thoracentesis    Result Date: 1/8/2024  DATE OF EXAM: 1/8/2024 4:00 PM EST PROCEDURE: US THORACENTESIS INDICATIONS: Loculated effusion w/ chest pain, IVDU COMPARISON: No Comparisons Available FLUOROSCOPIC TIME: None PHYSICIAN MONITORED CONSCIOUS SEDATION TIME: None minutes TECHNIQUE: A detailed explanation of the procedure, including the risks, benefits, and alternatives was provided. A preprocedure timeout was performed. The interventional radiology nurse monitored the patient at all times. The patient was placed upright in the bed and the right chest was ultrasounded, demonstrating a loculated right effusion. The right chest was then marked and prepped and draped in the usual sterile fashion. The skin and subcutaneous tissues were anesthetized with 1% lidocaine and a small skin incision was made. Next, a 4 Urdu centesis needle was advanced into the collection. A total of 360 mL of slightly cloudy fluid was aspirated and the needle was removed. The patient tolerated the procedure well, without immediate complication. FINDINGS: See above     1. Right thoracentesis yielding 360 mL of slightly cloudy fluid. Electronically Signed: Main Mcclure MD  1/8/2024 4:37 PM EST  Workstation ID: [id number]    XR Chest 1 View    Result Date: 1/8/2024  XR CHEST 1 VW Date of Exam: 1/8/2024 4:09 PM EST Indication: s/p thora Comparison: CT chest from January 8, 2024 Findings: There is a small loculated right pleural effusion with right basilar opacity. No pneumothorax is identified. The heart and mediastinal contours appear stable. The pulmonary vascular appears normal. The osseous structures appear intact.     Impression: 1.Small loculated right pleural effusion. 2.Right basilar opacity, which could reflect atelectasis or pneumonia. 3.No pneumothorax identified. Electronically Signed: Byron Maria MD  1/8/2024 4:32 PM EST  Workstation ID: [id number]    CT Angiogram  Chest    Result Date: 1/8/2024  CT ANGIOGRAM CHEST Date of Exam: 1/8/2024 12:29 PM EST Indication: chest pain, SOA, elevated d-dimer. r/o PE, infection. known IV drug user. Comparison: None available. Technique: CTA of the chest was performed after the uneventful intravenous administration of 85 mL Isovue-370. Reconstructed coronal and sagittal images were also obtained. In addition, a 3-D volume rendered image was created for interpretation. Automated exposure control and iterative reconstruction methods were used. Findings: There is relatively more dense contrast in the SVC and thoracic aorta as compared to the pulmonary arteries and their branches. No large central embolism is identified although peripheral branches are not adequately evaluated on this exam. There are no enlarged mediastinal nodes. There are calcified hilar nodes. There is a small partially loculated right pleural effusion. There is no left pleural effusion. There is compressive atelectasis of portions of the right middle and right lower lobes. There is triangular atelectasis of the left lower lobe.     Impression: Exam is not adequate for detection of pulmonary embolism. Repeat scanning should be considered if clinically warranted. Small loculated right pleural effusion. Areas of compressive atelectasis of the right middle and right lower lobes, and mild atelectasis of the left lower lobe. Electronically Signed: Reyna Caldwell MD  1/8/2024 12:55 PM EST  Workstation ID: [id number]    XR Chest 1 View    Result Date: 1/8/2024  XR CHEST 1 VW Date of Exam: 1/8/2024 8:26 AM EST Indication: Chest Pain Triage Protocol Comparison: None available. Findings: Lung volumes are diminished and there are perihilar interstitial opacities suggesting pulmonary edema pattern. Small bilateral pleural effusions are evident, greater on the right. There is no distinct pneumothorax. The heart appears mildly enlarged.     Impression: Hypoventilatory changes and evidence  of likely pulmonary edema with small bilateral pleural effusions. Electronically Signed: Gio Marte MD  1/8/2024 8:47 AM EST  Workstation ID: [id number]     Results for orders placed during the hospital encounter of 01/08/24    Duplex Venous Lower Extremity - Bilateral CAR    Interpretation Summary    Normal bilateral lower extremity venous duplex scan.      Results for orders placed during the hospital encounter of 01/08/24    Duplex Venous Lower Extremity - Bilateral CAR    Interpretation Summary    Normal bilateral lower extremity venous duplex scan.      Results for orders placed during the hospital encounter of 01/08/24    Adult Transthoracic Echo Complete w/ Color, Spectral and Contrast if Necessary Per Protocol    Interpretation Summary    Left ventricular systolic function is normal. Calculated left ventricular EF = 57.8% Left ventricular ejection fraction appears to be 56 - 60%.    Estimated right ventricular systolic pressure from tricuspid regurgitation is normal (<35 mmHg).    Mild MR      Plan for Follow-up of Pending Labs/Results: He will follow up in infectious disease clinic  Pending Labs       Order Current Status    AFB Culture - Body Fluid, Pleural Cavity Preliminary result    AFB Culture - Tissue, Pleural Cavity Preliminary result    Anaerobic Culture - Tissue, Pleural Cavity Preliminary result    Fungus Culture - Body Fluid, Pleural Cavity Preliminary result          Discharge Details        Discharge Medications        New Medications        Instructions Start Date   acetaminophen 500 MG tablet  Commonly known as: TYLENOL   1,000 mg, Oral, Every 8 Hours Scheduled      amitriptyline 25 MG tablet  Commonly known as: ELAVIL   25 mg, Oral, Nightly      Anti-Diarrheal 2 MG tablet  Generic drug: loperamide   2 mg, Oral, 4 Times Daily PRN      cefuroxime 500 MG tablet  Commonly known as: CEFTIN   500 mg, Oral, 2 Times Daily      cyclobenzaprine 5 MG tablet  Commonly known as: FLEXERIL   10 mg, Oral,  3 Times Daily PRN      gabapentin 100 MG capsule  Commonly known as: NEURONTIN   Take 3 capsules by mouth 3 (Three) Times a Day for 3 days, THEN 1 capsule 3 (Three) Times a Day for 3 days, THEN 1 capsule Every Night for 3 days.   Start Date: January 17, 2024     hydrOXYzine 25 MG tablet  Commonly known as: ATARAX   25 mg, Oral, Every 8 Hours PRN      melatonin 5 MG tablet tablet   10 mg, Oral, Nightly PRN      metroNIDAZOLE 500 MG tablet  Commonly known as: FLAGYL   500 mg, Oral, Every 8 Hours Scheduled      sulfamethoxazole-trimethoprim 800-160 MG per tablet  Commonly known as: Bactrim DS   1 tablet, Oral, 2 Times Daily             Continue These Medications        Instructions Start Date   lidocaine 4 % cream  Commonly known as: LMX   1 application , Topical, As Needed      methadone 10 MG tablet  Commonly known as: DOLOPHINE   90 mg, Oral, Daily      naproxen 500 MG tablet  Commonly known as: NAPROSYN   500 mg, Oral, 2 Times Daily With Meals               No Known Allergies      Discharge Disposition:  Home or Self Care    Diet:  Hospital:  Diet Order   Procedures    Diet: Regular/House Diet; Texture: Regular Texture (IDDSI 7); Fluid Consistency: Thin (IDDSI 0)            CODE STATUS:    Code Status and Medical Interventions:   Ordered at: 01/11/24 1253     Code Status (Patient has no pulse and is not breathing):    CPR (Attempt to Resuscitate)     Medical Interventions (Patient has pulse or is breathing):    Full Support       Future Appointments   Date Time Provider Department Center   1/31/2024  9:30 AM Lakeisha Ny APRN [facility]       Additional Instructions for the Follow-ups that You Need to Schedule       Discharge Follow-up with PCP   As directed       Currently Documented PCP:    Provider, No Known    PCP Phone Number:    None     Follow Up Details: 1 week                      Evy Monique MD  01/17/24      Time Spent on Discharge:  I spent  25  minutes on this discharge activity which  included: face-to-face encounter with the patient, reviewing the data in the system, coordination of the care with the nursing staff as well as consultants, documentation, and entering orders.

## 2024-01-17 NOTE — PROGRESS NOTES
"Enter Query Response Below      Query Response: Sepsis Present on Admission              If applicable, please update the problem list.   Patient: Lion Solis        : 1990  Account: [id number]           Admit Date:         How to Respond to this query:       a. Click New Note     b. Answer query within the yellow box.                c. Update the Problem List, if applicable.      If you have any questions about this query contact me at: [email or web address]      Dr. Monique,      33-year male patient with PMHx that includes Chronic Methadone Use, IV Drug Use with Fentanyl, Vaping admitted 24 with Right side Pleuritic Chest Pain, Recent Fever 101 orally, Loculated Pleural Effusion.     Infectious Disease Progress Notes (-)  state, \" Sepsis with fevers, tachycardia, and leukocytosis- Suspect possibly due to an empyema on the right side.  1 blood culture set was positive for staph epidermidis which likely represents a contaminate.\"     Labs: 24: WBC 17.97  Neutrophil % 80.0 Lactate 1.2 Procalcitonin 0.21    WBC 14.61  C-Reactive Protein 28.31 Procalcitonin 0.37     On admit date Temperature ranged between 99.5 - 101, Heart Rate ranged between  with Respiratory Rate 18-24.    Temperature 100.9, Heart Rate of 98   01/10 Temperature 102.5, Heart Rate 95, Respiratory Rate 22    Treatment included Right Thoracentesis, Right thoracotomy with total complete pleural decortication with insertion of 2 chest tubes,   IV Rocephin, IV Vancomycin,  Flagyl by mouth.     Please clarify the following:  > Sepsis Present on Admission  > Sepsis Ruled Out  > Other: specify ___________  > Unable to determine    By submitting this query, we are merely seeking further clarification of documentation to accurately reflect all conditions that you are monitoring, evaluating, treating or that extend the hospitalization or utilize additional resources of care. Please utilize your independent " clinical judgment when addressing the question(s) above.     This query and your response, once completed, will be entered into the legal medical record.    Sincerely,  Tatum Bauer RN, [facility]  Clinical Documentation Integrity Program   [email or web address]

## 2024-01-17 NOTE — CASE MANAGEMENT/SOCIAL WORK
Continued Stay Note  [facility]     Patient Name: Lion Solis  MRN: [id number]  Today's Date: 1/17/2024    Admit Date: 1/8/2024    Plan: Return to [facility] for Methadone maintenance   Discharge Plan       Row Name 01/17/24 1133       Plan    Plan Return to [facility] for Methadone maintenance    Plan Comments Pt is to return to [facility]- I will send any records that they may require- the patient has signed a [employer] for [facility] and [facility].  Ok to discharge from an addiction standpoint.      Row Name 01/17/24 1122       Plan    Plan Home with spouse    Patient/Family in Agreement with Plan yes    Final Discharge Disposition Code 01 - home or self-care    Final Note Plan is home with spouse. Spouse will transport. Patient denies any discharge needs.      Row Name 01/17/24 0909       Plan    Plan Home    Patient/Family in Agreement with Plan yes    Plan Comments Spoke with patient at bedside. Plan is home with family. Plan is for PO antibiotics at discharge. Patient denies any discharge needs at this time. CM will continue to follow.    Final Discharge Disposition Code 01 - home or self-care                   Discharge Codes    No documentation.                 Expected Discharge Date and Time       Expected Discharge Date Expected Discharge Time    Jan 17, 2024               Laura Banks RN MA,BSN-  Addiction Medicine

## 2024-01-17 NOTE — CASE MANAGEMENT/SOCIAL WORK
Case Management Discharge Note      Final Note: Plan is home with spouse. Spouse will transport. Patient denies any discharge needs.         Selected Continued Care - Admitted Since 1/8/2024       Destination    No services have been selected for the patient.                Durable Medical Equipment    No services have been selected for the patient.                Dialysis/Infusion    No services have been selected for the patient.                Home Medical Care    No services have been selected for the patient.                Therapy    No services have been selected for the patient.                Community Resources    No services have been selected for the patient.                Community & DME    No services have been selected for the patient.                         Final Discharge Disposition Code: 01 - home or self-care

## 2024-01-18 ENCOUNTER — TRANSITIONAL CARE MANAGEMENT TELEPHONE ENCOUNTER (OUTPATIENT)
Dept: CALL CENTER | Facility: HOSPITAL | Age: 34
End: 2024-01-18
Payer: COMMERCIAL

## 2024-01-18 LAB
BACTERIA SPEC ANAEROBE CULT: NORMAL
MYCOBACTERIUM SPEC CULT: NORMAL
NIGHT BLUE STAIN TISS: NORMAL

## 2024-01-18 NOTE — PAYOR COMM NOTE
"[facility]# [id number]   Discharge Summary    Utilization Review  Phone [phone]  Fax [phone]    [place] [address]     Lambert Reddy (33 y.o. Male)       Date of Birth   1990    Social Security Number       Address   [address] [place] [address]    Home Phone   [phone]    MRN   [id number]       Anabaptism   None    Marital Status                               Admission Date   24    Admission Type   Emergency    Admitting Provider   Evy Monique MD    Attending Provider       Department, Room/Bed   [facility] 4H, S473/1       Discharge Date   2024    Discharge Disposition   Home or Self Care    Discharge Destination                                 Attending Provider: (none)   Allergies: No Known Allergies    Isolation: None   Infection: MRSA (24)   Code Status: Prior    Ht: 175 cm (68.9\")   Wt: 132 kg (291 lb 0.1 oz)    Admission Cmt: None   Principal Problem: Loculated pleural effusion [J90]                   Active Insurance as of 2024       Primary Coverage       Payor Plan Insurance Group Employer/Plan Group    [employer] [place] [employer] [place]ICA[place]        Payor Plan Address Payor Plan Phone Number Payor Plan Fax Number Effective Dates    [address] [address] [phone]  2019 - None Entered    [facility] [id number]         Subscriber Name Subscriber Birth Date Member ID       LAMBERT REDDY 1990 [id number]                     Emergency Contacts        (Rel.) Home Phone Work Phone Mobile Phone    Alondra Reddy (Spouse) -- -- [phone]                 Discharge Summary        Evy Monique MD at 24 1240              [facility] Medicine Services  DISCHARGE SUMMARY    Patient Name: Lambert Reddy  : 1990  MRN: [id number]    Date of Admission: 2024  7:41 AM  Date of Discharge:  2024  Primary Care Physician: Provider, No " Known    Consults       Date and Time Order Name Status Description    1/9/2024 11:43 AM Inpatient Cardiothoracic Surgery Consult Completed     1/9/2024 10:42 AM Inpatient Infectious Diseases Consult Completed     1/9/2024  5:06 AM Inpatient Infectious Diseases Consult Completed             Hospital Course     Presenting Problem: Chest pain    Active Hospital Problems    Diagnosis  POA    **Loculated pleural effusion [J90]  Yes    Opioid dependence on agonist therapy [F11.20]  Yes    Pleural effusion [J90]  Yes    IVDU (intravenous drug user) [F19.90]  Yes      Resolved Hospital Problems   No resolved problems to display.          Hospital Course:  Lion Solis is a 33 y.o. male with IV drug use (Fentanyl) on chronic methadone, vaping presented with right-sided chest pain and shortness of breath.  Patient was recently diagnosed with an upper respiratory infection and seen at an urgent treatment center 5 days prior to admission.  At that time the chest pain was felt to be related to a muscle strain and he was given steroids and naproxen.  He then became febrile and presented to the ED.   In the ED had an elevated white blood cell count.  CT of the chest showed a loculated right pleural effusion with compressive atelectasis of the right middle lobe and right lower lobe.  Thoracentesis was performed and patient had a exudative effusion.  Gram stain was negative.  Blood cultures grew coag negative staph which is thought to be a contaminant.  HIV was negative.  Infectious disease and CT surgery were consulted.  Patient was taken to the OR on 1/11 by Dr. Vargas and underwent right-sided decortication and pleural fluid drainage.  Post procedurally patient was admitted to the ICU and subsequently transferred to telemetry on 1/14/2024.     Loculated right pleural effusion  -Likely complicated parapneumonic effusion  -Underwent right decortication by Dr. Vargas on 1/11/2023.  -ID followed and recommended PO cefuroxime, PO  flagyl, and PO bactrim x 2 weeks.  Outpatient follow up with Dr. Byron Curiel in 2 weeks.  -F/U CT surgery clinic 1/31     History of IV drug use  -Continue home methadone  -PRN medications added for withdrawal symptoms, PO gabapentin and naproxen prescribed for pain  -Patient asked to restart Elavil nightly (previously was on this) so I sent a prescription and he will need to establish care with a PCP for titration      Discharge Follow Up Recommendations for outpatient labs/diagnostics:  F/U PCP 1 week to establish care  F/U CT surgery 1/31  F/U ID 2 weeks    Day of Discharge     HPI:   He is still having some pain today.  Mild withdrawal symptoms.  He would like to go home.  Plans to continue with his methadone clinic at discharge.  Oxycodone doesn't have any effect on pain or withdrawal symptoms.      Vital Signs:   Temp:  [98.1 °F (36.7 °C)-98.5 °F (36.9 °C)] 98.5 °F (36.9 °C)  Heart Rate:  [] 113  Resp:  [16] 16  BP: (120-125)/(70-77) 120/77      Physical Exam:  Constitutional: No acute distress, awake, alert, laying in bed  HENT: NCAT, mucous membranes moist  Respiratory: Respiratory effort normal   Musculoskeletal: No bilateral ankle edema  Psychiatric: Appropriate affect, cooperative  Neurologic: Speech clear and fluent  Skin: Multiple tattoos; no rashes    Pertinent  and/or Most Recent Results     LAB RESULTS:      Lab 01/17/24  0809 01/15/24  0426 01/14/24  0700 01/13/24  0711 01/12/24  0602 01/11/24  0213   WBC 13.02* 13.25* 13.00* 13.21* 16.58* 14.61*   HEMOGLOBIN 12.9* 12.4* 12.0* 11.5* 11.7* 11.7*   HEMATOCRIT 39.4 37.2* 36.0* 35.0* 35.4* 35.0*   PLATELETS 303 294 315 300 279 212   NEUTROS ABS  --  9.22* 9.29* 9.44* 14.16* 10.88*   IMMATURE GRANS (ABS)  --  0.55* 0.29* 0.23* 0.14* 0.08*   LYMPHS ABS  --  2.35 2.47 2.68 1.38 2.38   MONOS ABS  --  0.81 0.80 0.71 0.87 0.95*   EOS ABS  --  0.23 0.11 0.12 0.00 0.28   MCV 86.4 83.2 83.9 85.2 83.7 85.8   CRP  --   --   --   --   --  28.31*    PROCALCITONIN  --   --   --   --   --  0.37*         Lab 01/17/24  0610 01/16/24  0634 01/15/24  0426 01/14/24  1613 01/14/24  0700 01/13/24  2034 01/13/24  0711   SODIUM 138 138 139  --  133*  --  140   POTASSIUM 3.9 4.3 3.9 3.7 3.6   < > 3.3*   CHLORIDE 105 104 105  --  100  --  106   CO2 22.0 20.0* 22.0  --  23.0  --  25.0   ANION GAP 11.0 14.0 12.0  --  10.0  --  9.0   BUN 15 11 9  --  10  --  19   CREATININE 0.60* 0.53* 0.51*  --  0.50*  --  0.53*   EGFR 130.7 135.7 137.3  --  138.1  --  135.7   GLUCOSE 114* 97 103*  --  102*  --  87   CALCIUM 8.3* 8.3* 8.2*  --  8.6  --  8.0*   MAGNESIUM  --   --   --   --  1.9  --  2.1    < > = values in this interval not displayed.                         Brief Urine Lab Results       None          Microbiology Results (last 10 days)       Procedure Component Value - Date/Time    Anaerobic Culture - Tissue, Pleural Cavity [[id number]]  (Normal) Collected: 01/11/24 1142    Lab Status: Preliminary result Specimen: Tissue from Pleural Cavity Updated: 01/16/24 0827     Anaerobic Culture Screening for Anaerobes    Tissue / Bone Culture - Tissue, Pleural Cavity [[id number]] Collected: 01/11/24 1142    Lab Status: Final result Specimen: Tissue from Pleural Cavity Updated: 01/14/24 0655     Tissue Culture No growth at 3 days     Gram Stain Few (2+) WBCs seen      No organisms seen    AFB Culture - Tissue, Pleural Cavity [[id number]] Collected: 01/11/24 1142    Lab Status: Preliminary result Specimen: Tissue from Pleural Cavity Updated: 01/16/24 1415     AFB Culture No AFB isolated at less than 1 week     AFB Stain No acid fast bacilli seen on concentrated smear    Blood Culture - Blood, Hand, Left [[id number]]  (Normal) Collected: 01/09/24 0525    Lab Status: Final result Specimen: Blood from Hand, Left Updated: 01/14/24 0715     Blood Culture No growth at 5 days    AFB Culture - Body Fluid, Pleural Cavity [[id number]] Collected: 01/08/24 1618    Lab Status: Preliminary result  Specimen: Body Fluid from Pleural Cavity Updated: 01/15/24 1715     AFB Culture No AFB isolated at 1 week     AFB Stain No acid fast bacilli seen on concentrated smear    Body Fluid Culture - Body Fluid, Pleural Cavity [[id number]] Collected: 01/08/24 1618    Lab Status: Final result Specimen: Body Fluid from Pleural Cavity Updated: 01/11/24 0817     Body Fluid Culture No growth at 3 days     Gram Stain Few (2+) WBCs seen      Few (2+) Red blood cells      No organisms seen    Anaerobic Culture - Pleural Fluid, Pleural Cavity [[id number]]  (Normal) Collected: 01/08/24 1618    Lab Status: Final result Specimen: Pleural Fluid from Pleural Cavity Updated: 01/13/24 0952     Anaerobic Culture No anaerobes isolated at 5 days    Fungus Culture - Body Fluid, Pleural Cavity [[id number]] Collected: 01/08/24 1618    Lab Status: Preliminary result Specimen: Body Fluid from Pleural Cavity Updated: 01/15/24 1715     Fungus Culture No fungus isolated at 1 week    Fungus Smear - Body Fluid, Pleural Cavity [[id number]] Collected: 01/08/24 1618    Lab Status: Final result Specimen: Body Fluid from Pleural Cavity Updated: 01/09/24 1335     Fungal Stain No fungal elements seen    MRSA Screen, PCR (Inpatient) - Swab, Nares [[id number]]  (Abnormal) Collected: 01/08/24 1450    Lab Status: Final result Specimen: Swab from Nares Updated: 01/08/24 1650     MRSA PCR Positive    Narrative:      The negative predictive value of this diagnostic test is high and should only be used to consider de-escalating anti-MRSA therapy. A positive result may indicate colonization with MRSA and must be correlated clinically.    Blood Culture - Blood, Arm, Right [[id number]]  (Normal) Collected: 01/08/24 1434    Lab Status: Final result Specimen: Blood from Arm, Right Updated: 01/13/24 1501     Blood Culture No growth at 5 days    Narrative:      Aerobic Bottle Only    Less than seven (7) mL's of blood was collected.  Insufficient quantity may yield false negative  results.    Blood Culture - Blood, Arm, Left [[id number]]  (Abnormal)  (Susceptibility) Collected: 01/08/24 0830    Lab Status: Edited Result - FINAL Specimen: Blood from Arm, Left Updated: 01/11/24 0921     Blood Culture Staphylococcus epidermidis     Isolated from Anaerobic Bottle     Gram Stain Anaerobic Bottle Gram positive cocci in clusters    Narrative:      Dr Curiel called and requested further workup    Less than seven (7) mL's of blood was collected.  Insufficient quantity may yield false negative results.    Susceptibility        Staphylococcus epidermidis      JUANJO      Oxacillin Susceptible      Vancomycin Susceptible                           Blood Culture ID, PCR - Blood, Arm, Left [[id number]]  (Abnormal) Collected: 01/08/24 0830    Lab Status: Final result Specimen: Blood from Arm, Left Updated: 01/09/24 0545     BCID, PCR Staph spp, not aureus or lugdunensis. Identification by BCID2 PCR.     BOTTLE TYPE Anaerobic Bottle    Blood Culture - Blood, Arm, Left [[id number]]  (Normal) Collected: 01/08/24 0828    Lab Status: Final result Specimen: Blood from Arm, Left Updated: 01/13/24 0846     Blood Culture No growth at 5 days    Narrative:      Less than seven (7) mL's of blood was collected.  Insufficient quantity may yield false negative results.    COVID PRE-OP / PRE-PROCEDURE SCREENING ORDER (NO ISOLATION) - Swab, Nasopharynx [[id number]]  (Normal) Collected: 01/08/24 0805    Lab Status: Final result Specimen: Swab from Nasopharynx Updated: 01/08/24 0915    Narrative:      The following orders were created for panel order COVID PRE-OP / PRE-PROCEDURE SCREENING ORDER (NO ISOLATION) - Swab, Nasopharynx.  Procedure                               Abnormality         Status                     ---------                               -----------         ------                     Respiratory Panel PCR w/...[[id number]]  Normal              Final result                 Please view results for these tests on the  individual orders.    Respiratory Panel PCR w/COVID-19(SARS-CoV-2) VERONICA/MARCELO/ISIDRO/PAD/COR/JOHN In-House, NP Swab in UTM/VTM, 2 HR TAT - Swab, Nasopharynx [[id number]]  (Normal) Collected: 01/08/24 0805    Lab Status: Final result Specimen: Swab from Nasopharynx Updated: 01/08/24 0915     ADENOVIRUS, PCR Not Detected     Coronavirus 229E Not Detected     Coronavirus HKU1 Not Detected     Coronavirus NL63 Not Detected     Coronavirus OC43 Not Detected     COVID19 Not Detected     Human Metapneumovirus Not Detected     Human Rhinovirus/Enterovirus Not Detected     Influenza A PCR Not Detected     Influenza B PCR Not Detected     Parainfluenza Virus 1 Not Detected     Parainfluenza Virus 2 Not Detected     Parainfluenza Virus 3 Not Detected     Parainfluenza Virus 4 Not Detected     RSV, PCR Not Detected     Bordetella pertussis pcr Not Detected     Bordetella parapertussis PCR Not Detected     Chlamydophila pneumoniae PCR Not Detected     Mycoplasma pneumo by PCR Not Detected    Narrative:      In the setting of a positive respiratory panel with a viral infection PLUS a negative procalcitonin without other underlying concern for bacterial infection, consider observing off antibiotics or discontinuation of antibiotics and continue supportive care. If the respiratory panel is positive for atypical bacterial infection (Bordetella pertussis, Chlamydophila pneumoniae, or Mycoplasma pneumoniae), consider antibiotic de-escalation to target atypical bacterial infection.            XR Chest 1 View    Result Date: 1/17/2024  XR CHEST 1 VW Date of Exam: 1/17/2024 1:43 AM EST Indication: postop Comparison: 1/16/2024. Findings: Right chest tube has been removed. There is no identifiable right pneumothorax. There is continued mild atelectasis of the right lower lobe. Heart and pulmonary vessels appear within normal limits.     Impression: No pneumothorax identified after right chest tube removal. Continued mild atelectasis right lung  base. Electronically Signed: Reyna Caldwell MD  1/17/2024 7:36 AM EST  Workstation ID: [id number]    XR Chest 1 View    Result Date: 1/16/2024  XR CHEST 1 VW Date of Exam: 1/16/2024 12:29 AM EST Indication: postop Comparison: 1/15/2024. Findings: There is continued mild right basilar atelectasis. There may be an associated small effusion. Heart size is normal. Right chest tube is unchanged in position. There is no identifiable pneumothorax.     Impression: No significant change since 1 day prior. Electronically Signed: Reyna Caldwell MD  1/16/2024 8:03 AM EST  Workstation ID: [id number]    XR Chest 1 View    Result Date: 1/15/2024  XR CHEST 1 VW Date of Exam: 1/15/2024 6:43 AM CST Indication: postop Comparison: Chest x-ray 1/13/2024 Findings: The heart is prominent yet stable in size. There is right lower lung volume loss with elevation of the right hemidiaphragm and segmental atelectasis at the lung base. Mild right pleural effusion is noted unchanged. No evidence for pneumothorax.     Impression: 1. Persistent right lower lobe atelectasis and mild pleural effusion. Electronically Signed: Crystal Navarro MD  1/15/2024 7:02 AM CST  Workstation ID: [id number]    XR Chest 1 View    Result Date: 1/13/2024  XR CHEST 1 VW Date of Exam: 1/13/2024 2:25 AM EST Indication: PNEUMOTHORAX Postop Comparison:  1/12/2024 Findings: 2 right-sided chest tubes. Cardiac size is similar to prior exam. Mildly increased linear atelectasis in the right basilar lung. Small right layering pleural effusion. No substantial pneumothorax. No evidence of acute osseous abnormalities. Visualized upper abdomen is unremarkable.     Impression: Mildly increased linear atelectasis in the right basal lung. Small right layering pleural effusion. No substantial pneumothorax seen. Electronically Signed: Phoenix Cook MD  1/13/2024 9:25 AM EST  Workstation ID: [id number]    XR Chest 1 View    Result Date: 1/12/2024  XR CHEST 1 VW Date of Exam: 1/12/2024 1:46  AM EST Indication: postop Comparison: 1/11/2024 Findings: Postsurgical changes of the right hemithorax with 2 right-sided chest tubes in similar position to the prior study. Previously seen small right basilar pneumothorax no longer clearly identified. There is improving right basilar atelectasis. Stable cardiomegaly. Left lung clear. No significant effusion.     Impression: 1. Postsurgical changes of the right hemithorax with 2 right-sided chest tubes in place. Previously seen small right basilar pneumothorax no longer clearly identified. 2. Improving right basilar atelectasis. Electronically Signed: Ron Rogers MD  1/12/2024 7:32 AM EST  Workstation ID: [id number]    XR Chest 1 View    Result Date: 1/11/2024  XR CHEST 1 VW Date of Exam: 1/11/2024 12:35 PM CST Indication: postop Comparison: Chest x-ray 1/8/2024 Findings: The heart is prominent yet stable in size. There our right pleural chest tubes with a pneumothorax along the lateral lung base measuring 7 mm. There is right basilar airspace disease and pleural effusion.     Impression: 1. Right basilar airspace disease and pleural effusion. 2. Pneumothorax at the lateral right lung base. Electronically Signed: Crystal Navarro MD  1/11/2024 1:08 PM CST  Workstation ID: [id number]    Adult Transthoracic Echo Complete w/ Color, Spectral and Contrast if Necessary Per Protocol    Result Date: 1/9/2024    Left ventricular systolic function is normal. Calculated left ventricular EF = 57.8% Left ventricular ejection fraction appears to be 56 - 60%.   Estimated right ventricular systolic pressure from tricuspid regurgitation is normal (<35 mmHg).   Mild MR     Duplex Venous Lower Extremity - Bilateral CAR    Result Date: 1/9/2024    Normal bilateral lower extremity venous duplex scan.     US Thoracentesis    Result Date: 1/8/2024  DATE OF EXAM: 1/8/2024 4:00 PM EST PROCEDURE: US THORACENTESIS INDICATIONS: Loculated effusion w/ chest pain, IVDU COMPARISON: No Comparisons  Available FLUOROSCOPIC TIME: None PHYSICIAN MONITORED CONSCIOUS SEDATION TIME: None minutes TECHNIQUE: A detailed explanation of the procedure, including the risks, benefits, and alternatives was provided. A preprocedure timeout was performed. The interventional radiology nurse monitored the patient at all times. The patient was placed upright in the bed and the right chest was ultrasounded, demonstrating a loculated right effusion. The right chest was then marked and prepped and draped in the usual sterile fashion. The skin and subcutaneous tissues were anesthetized with 1% lidocaine and a small skin incision was made. Next, a 4 Macedonian centesis needle was advanced into the collection. A total of 360 mL of slightly cloudy fluid was aspirated and the needle was removed. The patient tolerated the procedure well, without immediate complication. FINDINGS: See above     1. Right thoracentesis yielding 360 mL of slightly cloudy fluid. Electronically Signed: Main Mcclure MD  1/8/2024 4:37 PM EST  Workstation ID: [id number]    XR Chest 1 View    Result Date: 1/8/2024  XR CHEST 1 VW Date of Exam: 1/8/2024 4:09 PM EST Indication: s/p thora Comparison: CT chest from January 8, 2024 Findings: There is a small loculated right pleural effusion with right basilar opacity. No pneumothorax is identified. The heart and mediastinal contours appear stable. The pulmonary vascular appears normal. The osseous structures appear intact.     Impression: 1.Small loculated right pleural effusion. 2.Right basilar opacity, which could reflect atelectasis or pneumonia. 3.No pneumothorax identified. Electronically Signed: Byron Maria MD  1/8/2024 4:32 PM EST  Workstation ID: [id number]    CT Angiogram Chest    Result Date: 1/8/2024  CT ANGIOGRAM CHEST Date of Exam: 1/8/2024 12:29 PM EST Indication: chest pain, SOA, elevated d-dimer. r/o PE, infection. known IV drug user. Comparison: None available. Technique: CTA of the chest was performed after  the uneventful intravenous administration of 85 mL Isovue-370. Reconstructed coronal and sagittal images were also obtained. In addition, a 3-D volume rendered image was created for interpretation. Automated exposure control and iterative reconstruction methods were used. Findings: There is relatively more dense contrast in the SVC and thoracic aorta as compared to the pulmonary arteries and their branches. No large central embolism is identified although peripheral branches are not adequately evaluated on this exam. There are no enlarged mediastinal nodes. There are calcified hilar nodes. There is a small partially loculated right pleural effusion. There is no left pleural effusion. There is compressive atelectasis of portions of the right middle and right lower lobes. There is triangular atelectasis of the left lower lobe.     Impression: Exam is not adequate for detection of pulmonary embolism. Repeat scanning should be considered if clinically warranted. Small loculated right pleural effusion. Areas of compressive atelectasis of the right middle and right lower lobes, and mild atelectasis of the left lower lobe. Electronically Signed: Reyna Caldwell MD  1/8/2024 12:55 PM EST  Workstation ID: [id number]    XR Chest 1 View    Result Date: 1/8/2024  XR CHEST 1 VW Date of Exam: 1/8/2024 8:26 AM EST Indication: Chest Pain Triage Protocol Comparison: None available. Findings: Lung volumes are diminished and there are perihilar interstitial opacities suggesting pulmonary edema pattern. Small bilateral pleural effusions are evident, greater on the right. There is no distinct pneumothorax. The heart appears mildly enlarged.     Impression: Hypoventilatory changes and evidence of likely pulmonary edema with small bilateral pleural effusions. Electronically Signed: Gio Marte MD  1/8/2024 8:47 AM EST  Workstation ID: [id number]     Results for orders placed during the hospital encounter of 01/08/24    Duplex Venous Lower  Extremity - Bilateral CAR    Interpretation Summary    Normal bilateral lower extremity venous duplex scan.      Results for orders placed during the hospital encounter of 01/08/24    Duplex Venous Lower Extremity - Bilateral CAR    Interpretation Summary    Normal bilateral lower extremity venous duplex scan.      Results for orders placed during the hospital encounter of 01/08/24    Adult Transthoracic Echo Complete w/ Color, Spectral and Contrast if Necessary Per Protocol    Interpretation Summary    Left ventricular systolic function is normal. Calculated left ventricular EF = 57.8% Left ventricular ejection fraction appears to be 56 - 60%.    Estimated right ventricular systolic pressure from tricuspid regurgitation is normal (<35 mmHg).    Mild MR      Plan for Follow-up of Pending Labs/Results: He will follow up in infectious disease clinic  Pending Labs       Order Current Status    AFB Culture - Body Fluid, Pleural Cavity Preliminary result    AFB Culture - Tissue, Pleural Cavity Preliminary result    Anaerobic Culture - Tissue, Pleural Cavity Preliminary result    Fungus Culture - Body Fluid, Pleural Cavity Preliminary result          Discharge Details        Discharge Medications        New Medications        Instructions Start Date   acetaminophen 500 MG tablet  Commonly known as: TYLENOL   1,000 mg, Oral, Every 8 Hours Scheduled      amitriptyline 25 MG tablet  Commonly known as: ELAVIL   25 mg, Oral, Nightly      Anti-Diarrheal 2 MG tablet  Generic drug: loperamide   2 mg, Oral, 4 Times Daily PRN      cefuroxime 500 MG tablet  Commonly known as: CEFTIN   500 mg, Oral, 2 Times Daily      cyclobenzaprine 5 MG tablet  Commonly known as: FLEXERIL   10 mg, Oral, 3 Times Daily PRN      gabapentin 100 MG capsule  Commonly known as: NEURONTIN   Take 3 capsules by mouth 3 (Three) Times a Day for 3 days, THEN 1 capsule 3 (Three) Times a Day for 3 days, THEN 1 capsule Every Night for 3 days.   Start Date: January  17, 2024     hydrOXYzine 25 MG tablet  Commonly known as: ATARAX   25 mg, Oral, Every 8 Hours PRN      melatonin 5 MG tablet tablet   10 mg, Oral, Nightly PRN      metroNIDAZOLE 500 MG tablet  Commonly known as: FLAGYL   500 mg, Oral, Every 8 Hours Scheduled      sulfamethoxazole-trimethoprim 800-160 MG per tablet  Commonly known as: Bactrim DS   1 tablet, Oral, 2 Times Daily             Continue These Medications        Instructions Start Date   lidocaine 4 % cream  Commonly known as: LMX   1 application , Topical, As Needed      methadone 10 MG tablet  Commonly known as: DOLOPHINE   90 mg, Oral, Daily      naproxen 500 MG tablet  Commonly known as: NAPROSYN   500 mg, Oral, 2 Times Daily With Meals               No Known Allergies      Discharge Disposition:  Home or Self Care    Diet:  Hospital:  Diet Order   Procedures    Diet: Regular/House Diet; Texture: Regular Texture (IDDSI 7); Fluid Consistency: Thin (IDDSI 0)            CODE STATUS:    Code Status and Medical Interventions:   Ordered at: 01/11/24 1253     Code Status (Patient has no pulse and is not breathing):    CPR (Attempt to Resuscitate)     Medical Interventions (Patient has pulse or is breathing):    Full Support       Future Appointments   Date Time Provider Department Center   1/31/2024  9:30 AM Lakeisha Ny APRN [facility]       Additional Instructions for the Follow-ups that You Need to Schedule       Discharge Follow-up with PCP   As directed       Currently Documented PCP:    Provider, No Known    PCP Phone Number:    None     Follow Up Details: 1 week                      Evy Monique MD  01/17/24      Time Spent on Discharge:  I spent  25  minutes on this discharge activity which included: face-to-face encounter with the patient, reviewing the data in the system, coordination of the care with the nursing staff as well as consultants, documentation, and entering orders.           Electronically signed by Evy Monique MD at 01/17/24  1251       Discharge Order (From admission, onward)       Start     Ordered    01/17/24 1023  Discharge patient  Once        Expected Discharge Date: 01/17/24   Discharge Disposition: Home or Self Care   Physician of Record for Attribution - Please select from Treatment Team: GUILLERMO GUNN [[id number]]   Review needed by CMO to determine Physician of Record: No      Question Answer Comment   Physician of Record for Attribution - Please select from Treatment Team GUILLERMO GUNN    Review needed by CMO to determine Physician of Record No        01/17/24 1029

## 2024-01-18 NOTE — OUTREACH NOTE
Prep Survey      Flowsheet Row Responses   [facility] patient discharged from? [place]   Is LACE score < 7 ? No   Eligibility [facility]   Date of Admission 01/08/24   Date of Discharge 01/17/24   Discharge Disposition Home or Self Care   Discharge diagnosis pleural effusion, THORACOTOMY WITH DECORTICATION   Does the patient have one of the following disease processes/diagnoses(primary or secondary)? Cardiothoracic surgery   Does the patient have Home health ordered? No   Is there a DME ordered? No   Comments regarding appointments new PCP appt   Prep survey completed? Yes            Charleen Rawls Registered Nurse

## 2024-01-18 NOTE — OUTREACH NOTE
Call Center TCM Note      Flowsheet Row Responses   [facility] patient discharged from? [place]   Does the patient have one of the following disease processes/diagnoses(primary or secondary)? Cardiothoracic surgery   TCM attempt successful? Yes   Call start time 1016   Call end time 1019   Discharge diagnosis pleural effusion, THORACOTOMY WITH DECORTICATION   Meds reviewed with patient/caregiver? Yes   Is the patient having any side effects they believe may be caused by any medication additions or changes? No   Does the patient have all medications related to this admission filled (includes all antibiotics, pain medications, cardiac medications, etc.) Yes   Is the patient taking all medications as directed (includes completed medication regime)? Yes   Comments HOSP DC FU appt 1/22/24 11 am   Does the patient have an appointment with their PCP within 7-14 days of discharge? Yes   Has home health visited the patient within 72 hours of discharge? N/A   Psychosocial issues? No   Did the patient receive a copy of their discharge instructions? Yes   Nursing interventions Reviewed instructions with patient   What is the patient's perception of their health status since discharge? Improving   Nursing interventions Nurse provided patient education   Is the patient /caregiver able to teach back basic post-op care? Shower daily, No tub bath, swimming, or hot tub until instructed by MD, Lifting as instructed by MD in discharge instructions, Drive as instructed by MD in discharge instructions, Hold pillow to support chest when coughing, Continue use of incentive spirometry at least 1 week post discharge   Is the patient/caregiver able to teach back signs and symptoms of incisional infection? Increased redness, swelling or pain at the incisonal site, Increased drainage or bleeding, Incisional warmth, Pus or odor from incision, Fever   Is the patient/caregiver able to teach back steps to recovery at home? Set small,  achievable goals for return to baseline health, Rest and rebuild strength, gradually increase activity, Eat a well-balance diet   If the patient is a current smoker, are they able to teach back resources for cessation? [phone]  [cut back on vape]   Is the patient/caregiver able to teach back the hierarchy of who to call/visit for symptoms/problems? PCP, Specialist, Home health nurse, Urgent Care, ED, 911 Yes   TCM call completed? Yes   Wrap up additional comments Pt doing well no needs.   Call end time 1019            Maryanne Fulton RN    1/18/2024, 10:19 EST

## 2024-01-18 NOTE — OUTREACH NOTE
Call Center TCM Note      Flowsheet Row Responses   [facility] patient discharged from? [place]   Does the patient have one of the following disease processes/diagnoses(primary or secondary)? Cardiothoracic surgery   TCM attempt successful? No   Unsuccessful attempts Attempt 1   Comments HOSP DC FU appt 1/22/24 11 am   Does the patient have an appointment with their PCP within 7-14 days of discharge? Yes            Maryanne Fulton RN    1/18/2024, 08:35 EST

## 2024-01-24 ENCOUNTER — OFFICE VISIT (OUTPATIENT)
Dept: FAMILY MEDICINE CLINIC | Facility: CLINIC | Age: 34
End: 2024-01-24
Payer: COMMERCIAL

## 2024-01-24 VITALS
TEMPERATURE: 97.8 F | HEART RATE: 95 BPM | SYSTOLIC BLOOD PRESSURE: 118 MMHG | DIASTOLIC BLOOD PRESSURE: 86 MMHG | WEIGHT: 315 LBS | OXYGEN SATURATION: 98 % | BODY MASS INDEX: 46.65 KG/M2 | HEIGHT: 69 IN

## 2024-01-24 DIAGNOSIS — K21.9 GASTROESOPHAGEAL REFLUX DISEASE, UNSPECIFIED WHETHER ESOPHAGITIS PRESENT: ICD-10-CM

## 2024-01-24 DIAGNOSIS — F11.20 OPIOID DEPENDENCE ON AGONIST THERAPY: ICD-10-CM

## 2024-01-24 DIAGNOSIS — Z09 HOSPITAL DISCHARGE FOLLOW-UP: Primary | ICD-10-CM

## 2024-01-24 DIAGNOSIS — J90 LOCULATED PLEURAL EFFUSION: ICD-10-CM

## 2024-01-24 DIAGNOSIS — J90 PLEURAL EFFUSION: ICD-10-CM

## 2024-01-24 RX ORDER — FAMOTIDINE 20 MG/1
20 TABLET, FILM COATED ORAL 2 TIMES DAILY
Qty: 60 TABLET | Refills: 1 | Status: SHIPPED | OUTPATIENT
Start: 2024-01-24

## 2024-01-24 NOTE — PROGRESS NOTES
Transitional Care Follow Up Visit  Subjective     Lion Solis is a 33 y.o. male who presents for a transitional care management visit.    Within 48 business hours after discharge our office contacted him via telephone to coordinate his care and needs.      I reviewed and discussed the details of that call along with the discharge summary, hospital problems, inpatient lab results, inpatient diagnostic studies, and consultation reports with Lion.     Current outpatient and discharge medications have been reconciled for the patient.  Reviewed by: Pritesh Rendon, DO    Goes to [facility] in [place] Methadone [facility].  Hx of opioid dependency.  At the clinic takes 90 mg daily methadone.      Reports does have some reflux symptoms, epigastric pain at times.            2024     7:47 PM   Date of TCM Phone Call   [facility]   Date of Admission 2024   Date of Discharge 2024   Discharge Disposition Home or Self Care     Risk for Readmission (LACE) Score: 8 (2024  6:00 AM)      History of Present Illness   Course During Hospital Stay:      Patient Name: Lion Solis  : 1990  MRN: [id number]     Date of Admission: 2024  7:41 AM  Date of Discharge:  2024  Primary Care Physician: Provider, No Known     Consults         Date and Time Order Name Status Description     2024 11:43 AM Inpatient Cardiothoracic Surgery Consult Completed       2024 10:42 AM Inpatient Infectious Diseases Consult Completed       2024  5:06 AM Inpatient Infectious Diseases Consult Completed                  Hospital Course      Presenting Problem: Chest pain           Active Hospital Problems     Diagnosis   POA    **Loculated pleural effusion [J90]   Yes    Opioid dependence on agonist therapy [F11.20]   Yes    Pleural effusion [J90]   Yes    IVDU (intravenous drug user) [F19.90]   Yes       Resolved Hospital Problems   No resolved problems to display.            Hospital Course:  Lion  Irma is a 33 y.o. male with IV drug use (Fentanyl) on chronic methadone, vaping presented with right-sided chest pain and shortness of breath.  Patient was recently diagnosed with an upper respiratory infection and seen at an urgent treatment center 5 days prior to admission.  At that time the chest pain was felt to be related to a muscle strain and he was given steroids and naproxen.  He then became febrile and presented to the ED.   In the ED had an elevated white blood cell count.  CT of the chest showed a loculated right pleural effusion with compressive atelectasis of the right middle lobe and right lower lobe.  Thoracentesis was performed and patient had a exudative effusion.  Gram stain was negative.  Blood cultures grew coag negative staph which is thought to be a contaminant.  HIV was negative.  Infectious disease and CT surgery were consulted.  Patient was taken to the OR on 1/11 by Dr. Vargas and underwent right-sided decortication and pleural fluid drainage.  Post procedurally patient was admitted to the ICU and subsequently transferred to telemetry on 1/14/2024.     Loculated right pleural effusion  -Likely complicated parapneumonic effusion  -Underwent right decortication by Dr. Vargas on 1/11/2023.  -ID followed and recommended PO cefuroxime, PO flagyl, and PO bactrim x 2 weeks.  Outpatient follow up with Dr. Byron Curiel in 2 weeks.  In Feb.  -F/U CT surgery clinic 1/31     History of IV drug use  -Continue home methadone  -PRN medications added for withdrawal symptoms, PO gabapentin and naproxen prescribed for pain  -Patient asked to restart Elavil nightly (previously was on this) so I sent a prescription and he will need to establish care with a PCP for titration        Discharge Follow Up Recommendations for outpatient labs/diagnostics:  F/U PCP 1 week to establish care  F/U CT surgery 1/31  F/U ID 2 weeks     Day of Discharge      HPI:   He is still having some pain today.  Mild  withdrawal symptoms.  He would like to go home.  Plans to continue with his methadone clinic at discharge.  Oxycodone doesn't have any effect on pain or withdrawal symptoms.        Vital Signs:   Temp:  [98.1 °F (36.7 °C)-98.5 °F (36.9 °C)] 98.5 °F (36.9 °C)  Heart Rate:  [] 113  Resp:  [16] 16  BP: (120-125)/(70-77) 120/77        Physical Exam:  Constitutional: No acute distress, awake, alert, laying in bed  HENT: NCAT, mucous membranes moist  Respiratory: Respiratory effort normal   Musculoskeletal: No bilateral ankle edema  Psychiatric: Appropriate affect, cooperative  Neurologic: Speech clear and fluent  Skin: Multiple tattoos; no rashes     Pertinent  and/or Most Recent Results      LAB RESULTS:               Lab 01/17/24  0809 01/15/24  0426 01/14/24  0700 01/13/24  0711 01/12/24  0602 01/11/24  0213   WBC 13.02* 13.25* 13.00* 13.21* 16.58* 14.61*   HEMOGLOBIN 12.9* 12.4* 12.0* 11.5* 11.7* 11.7*   HEMATOCRIT 39.4 37.2* 36.0* 35.0* 35.4* 35.0*   PLATELETS 303 294 315 300 279 212   NEUTROS ABS  --  9.22* 9.29* 9.44* 14.16* 10.88*   IMMATURE GRANS (ABS)  --  0.55* 0.29* 0.23* 0.14* 0.08*   LYMPHS ABS  --  2.35 2.47 2.68 1.38 2.38   MONOS ABS  --  0.81 0.80 0.71 0.87 0.95*   EOS ABS  --  0.23 0.11 0.12 0.00 0.28   MCV 86.4 83.2 83.9 85.2 83.7 85.8   CRP  --   --   --   --   --  28.31*   PROCALCITONIN  --   --   --   --   --  0.37*                    Lab 01/17/24  0610 01/16/24  0634 01/15/24  0426 01/14/24  1613 01/14/24  0700 01/13/24 2034 01/13/24  0711   SODIUM 138 138 139  --  133*  --  140   POTASSIUM 3.9 4.3 3.9 3.7 3.6   < > 3.3*   CHLORIDE 105 104 105  --  100  --  106   CO2 22.0 20.0* 22.0  --  23.0  --  25.0   ANION GAP 11.0 14.0 12.0  --  10.0  --  9.0   BUN 15 11 9  --  10  --  19   CREATININE 0.60* 0.53* 0.51*  --  0.50*  --  0.53*   EGFR 130.7 135.7 137.3  --  138.1  --  135.7   GLUCOSE 114* 97 103*  --  102*  --  87   CALCIUM 8.3* 8.3* 8.2*  --  8.6  --  8.0*   MAGNESIUM  --   --   --   --   1.9  --  2.1    < > = values in this interval not displayed.          Brief Urine Lab Results         None             Microbiology Results (last 10 days)         Procedure Component Value - Date/Time     Anaerobic Culture - Tissue, Pleural Cavity [[id number]]  (Normal) Collected: 01/11/24 1142     Lab Status: Preliminary result Specimen: Tissue from Pleural Cavity Updated: 01/16/24 0827       Anaerobic Culture Screening for Anaerobes     Tissue / Bone Culture - Tissue, Pleural Cavity [[id number]] Collected: 01/11/24 1142     Lab Status: Final result Specimen: Tissue from Pleural Cavity Updated: 01/14/24 0655       Tissue Culture No growth at 3 days       Gram Stain Few (2+) WBCs seen         No organisms seen     AFB Culture - Tissue, Pleural Cavity [[id number]] Collected: 01/11/24 1142     Lab Status: Preliminary result Specimen: Tissue from Pleural Cavity Updated: 01/16/24 1415       AFB Culture No AFB isolated at less than 1 week       AFB Stain No acid fast bacilli seen on concentrated smear     Blood Culture - Blood, Hand, Left [[id number]]  (Normal) Collected: 01/09/24 0525     Lab Status: Final result Specimen: Blood from Hand, Left Updated: 01/14/24 0715       Blood Culture No growth at 5 days     AFB Culture - Body Fluid, Pleural Cavity [[id number]] Collected: 01/08/24 1618     Lab Status: Preliminary result Specimen: Body Fluid from Pleural Cavity Updated: 01/15/24 1715       AFB Culture No AFB isolated at 1 week       AFB Stain No acid fast bacilli seen on concentrated smear     Body Fluid Culture - Body Fluid, Pleural Cavity [[id number]] Collected: 01/08/24 1618     Lab Status: Final result Specimen: Body Fluid from Pleural Cavity Updated: 01/11/24 0817       Body Fluid Culture No growth at 3 days       Gram Stain Few (2+) WBCs seen         Few (2+) Red blood cells         No organisms seen     Anaerobic Culture - Pleural Fluid, Pleural Cavity [[id number]]  (Normal) Collected: 01/08/24 1618     Lab Status:  Final result Specimen: Pleural Fluid from Pleural Cavity Updated: 01/13/24 0952       Anaerobic Culture No anaerobes isolated at 5 days     Fungus Culture - Body Fluid, Pleural Cavity [[id number]] Collected: 01/08/24 1618     Lab Status: Preliminary result Specimen: Body Fluid from Pleural Cavity Updated: 01/15/24 1715       Fungus Culture No fungus isolated at 1 week     Fungus Smear - Body Fluid, Pleural Cavity [[id number]] Collected: 01/08/24 1618     Lab Status: Final result Specimen: Body Fluid from Pleural Cavity Updated: 01/09/24 1335       Fungal Stain No fungal elements seen     MRSA Screen, PCR (Inpatient) - Swab, Nares [[id number]]  (Abnormal) Collected: 01/08/24 1450     Lab Status: Final result Specimen: Swab from Nares Updated: 01/08/24 1650       MRSA PCR Positive     Narrative:       The negative predictive value of this diagnostic test is high and should only be used to consider de-escalating anti-MRSA therapy. A positive result may indicate colonization with MRSA and must be correlated clinically.     Blood Culture - Blood, Arm, Right [[id number]]  (Normal) Collected: 01/08/24 1434     Lab Status: Final result Specimen: Blood from Arm, Right Updated: 01/13/24 1501       Blood Culture No growth at 5 days     Narrative:       Aerobic Bottle Only     Less than seven (7) mL's of blood was collected.  Insufficient quantity may yield false negative results.     Blood Culture - Blood, Arm, Left [[id number]]  (Abnormal)  (Susceptibility) Collected: 01/08/24 0830     Lab Status: Edited Result - FINAL Specimen: Blood from Arm, Left Updated: 01/11/24 0921       Blood Culture Staphylococcus epidermidis       Isolated from Anaerobic Bottle       Gram Stain Anaerobic Bottle Gram positive cocci in clusters     Narrative:       Dr Curiel called and requested further workup     Less than seven (7) mL's of blood was collected.  Insufficient quantity may yield false negative results.     Susceptibility                    Staphylococcus epidermidis         JUANJO         Oxacillin Susceptible         Vancomycin Susceptible                                     Blood Culture ID, PCR - Blood, Arm, Left [[id number]]  (Abnormal) Collected: 01/08/24 0830     Lab Status: Final result Specimen: Blood from Arm, Left Updated: 01/09/24 0545       BCID, PCR Staph spp, not aureus or lugdunensis. Identification by BCID2 PCR.       BOTTLE TYPE Anaerobic Bottle     Blood Culture - Blood, Arm, Left [[id number]]  (Normal) Collected: 01/08/24 0828     Lab Status: Final result Specimen: Blood from Arm, Left Updated: 01/13/24 0846       Blood Culture No growth at 5 days     Narrative:       Less than seven (7) mL's of blood was collected.  Insufficient quantity may yield false negative results.     COVID PRE-OP / PRE-PROCEDURE SCREENING ORDER (NO ISOLATION) - Swab, Nasopharynx [[id number]]  (Normal) Collected: 01/08/24 0805     Lab Status: Final result Specimen: Swab from Nasopharynx Updated: 01/08/24 0915     Narrative:       The following orders were created for panel order COVID PRE-OP / PRE-PROCEDURE SCREENING ORDER (NO ISOLATION) - Swab, Nasopharynx.  Procedure                               Abnormality         Status                     ---------                               -----------         ------                     Respiratory Panel PCR w/...[[id number]]  Normal              Final result                  Please view results for these tests on the individual orders.     Respiratory Panel PCR w/COVID-19(SARS-CoV-2) VERONICA/MARCELO/ISIDRO/PAD/COR/JOHN In-House, NP Swab in UTM/VTM, 2 HR TAT - Swab, Nasopharynx [[id number]]  (Normal) Collected: 01/08/24 0805     Lab Status: Final result Specimen: Swab from Nasopharynx Updated: 01/08/24 0915       ADENOVIRUS, PCR Not Detected       Coronavirus 229E Not Detected       Coronavirus HKU1 Not Detected       Coronavirus NL63 Not Detected       Coronavirus OC43 Not Detected       COVID19 Not Detected       Human  Metapneumovirus Not Detected       Human Rhinovirus/Enterovirus Not Detected       Influenza A PCR Not Detected       Influenza B PCR Not Detected       Parainfluenza Virus 1 Not Detected       Parainfluenza Virus 2 Not Detected       Parainfluenza Virus 3 Not Detected       Parainfluenza Virus 4 Not Detected       RSV, PCR Not Detected       Bordetella pertussis pcr Not Detected       Bordetella parapertussis PCR Not Detected       Chlamydophila pneumoniae PCR Not Detected       Mycoplasma pneumo by PCR Not Detected     Narrative:       In the setting of a positive respiratory panel with a viral infection PLUS a negative procalcitonin without other underlying concern for bacterial infection, consider observing off antibiotics or discontinuation of antibiotics and continue supportive care. If the respiratory panel is positive for atypical bacterial infection (Bordetella pertussis, Chlamydophila pneumoniae, or Mycoplasma pneumoniae), consider antibiotic de-escalation to target atypical bacterial infection.                  Radiology results from the last 10 days:   XR Chest 1 View     Result Date: 1/17/2024  XR CHEST 1 VW Date of Exam: 1/17/2024 1:43 AM EST Indication: postop Comparison: 1/16/2024. Findings: Right chest tube has been removed. There is no identifiable right pneumothorax. There is continued mild atelectasis of the right lower lobe. Heart and pulmonary vessels appear within normal limits.      Impression: No pneumothorax identified after right chest tube removal. Continued mild atelectasis right lung base. Electronically Signed: Reyna Caldwell MD  1/17/2024 7:36 AM EST  Workstation ID: [id number]     XR Chest 1 View     Result Date: 1/16/2024  XR CHEST 1 VW Date of Exam: 1/16/2024 12:29 AM EST Indication: postop Comparison: 1/15/2024. Findings: There is continued mild right basilar atelectasis. There may be an associated small effusion. Heart size is normal. Right chest tube is unchanged in position.  There is no identifiable pneumothorax.      Impression: No significant change since 1 day prior. Electronically Signed: Reyna Caldwell MD  1/16/2024 8:03 AM EST  Workstation ID: [id number]     XR Chest 1 View     Result Date: 1/15/2024  XR CHEST 1 VW Date of Exam: 1/15/2024 6:43 AM CST Indication: postop Comparison: Chest x-ray 1/13/2024 Findings: The heart is prominent yet stable in size. There is right lower lung volume loss with elevation of the right hemidiaphragm and segmental atelectasis at the lung base. Mild right pleural effusion is noted unchanged. No evidence for pneumothorax.      Impression: 1. Persistent right lower lobe atelectasis and mild pleural effusion. Electronically Signed: Crystal Navarro MD  1/15/2024 7:02 AM CST  Workstation ID: [id number]     XR Chest 1 View     Result Date: 1/13/2024  XR CHEST 1 VW Date of Exam: 1/13/2024 2:25 AM EST Indication: PNEUMOTHORAX Postop Comparison:  1/12/2024 Findings: 2 right-sided chest tubes. Cardiac size is similar to prior exam. Mildly increased linear atelectasis in the right basilar lung. Small right layering pleural effusion. No substantial pneumothorax. No evidence of acute osseous abnormalities. Visualized upper abdomen is unremarkable.      Impression: Mildly increased linear atelectasis in the right basal lung. Small right layering pleural effusion. No substantial pneumothorax seen. Electronically Signed: Phoenix Cook MD  1/13/2024 9:25 AM EST  Workstation ID: [id number]     XR Chest 1 View     Result Date: 1/12/2024  XR CHEST 1 VW Date of Exam: 1/12/2024 1:46 AM EST Indication: postop Comparison: 1/11/2024 Findings: Postsurgical changes of the right hemithorax with 2 right-sided chest tubes in similar position to the prior study. Previously seen small right basilar pneumothorax no longer clearly identified. There is improving right basilar atelectasis. Stable cardiomegaly. Left lung clear. No significant effusion.      Impression: 1. Postsurgical  changes of the right hemithorax with 2 right-sided chest tubes in place. Previously seen small right basilar pneumothorax no longer clearly identified. 2. Improving right basilar atelectasis. Electronically Signed: Ron Rogers MD  1/12/2024 7:32 AM EST  Workstation ID: [id number]     XR Chest 1 View     Result Date: 1/11/2024  XR CHEST 1 VW Date of Exam: 1/11/2024 12:35 PM CST Indication: postop Comparison: Chest x-ray 1/8/2024 Findings: The heart is prominent yet stable in size. There our right pleural chest tubes with a pneumothorax along the lateral lung base measuring 7 mm. There is right basilar airspace disease and pleural effusion.      Impression: 1. Right basilar airspace disease and pleural effusion. 2. Pneumothorax at the lateral right lung base. Electronically Signed: Crystal Navarro MD  1/11/2024 1:08 PM CST  Workstation ID: [id number]     Adult Transthoracic Echo Complete w/ Color, Spectral and Contrast if Necessary Per Protocol     Result Date: 1/9/2024    Left ventricular systolic function is normal. Calculated left ventricular EF = 57.8% Left ventricular ejection fraction appears to be 56 - 60%.   Estimated right ventricular systolic pressure from tricuspid regurgitation is normal (<35 mmHg).   Mild MR      Duplex Venous Lower Extremity - Bilateral CAR     Result Date: 1/9/2024    Normal bilateral lower extremity venous duplex scan.      US Thoracentesis     Result Date: 1/8/2024  DATE OF EXAM: 1/8/2024 4:00 PM EST PROCEDURE: US THORACENTESIS INDICATIONS: Loculated effusion w/ chest pain, IVDU COMPARISON: No Comparisons Available FLUOROSCOPIC TIME: None PHYSICIAN MONITORED CONSCIOUS SEDATION TIME: None minutes TECHNIQUE: A detailed explanation of the procedure, including the risks, benefits, and alternatives was provided. A preprocedure timeout was performed. The interventional radiology nurse monitored the patient at all times. The patient was placed upright in the bed and the right chest was  ultrasounded, demonstrating a loculated right effusion. The right chest was then marked and prepped and draped in the usual sterile fashion. The skin and subcutaneous tissues were anesthetized with 1% lidocaine and a small skin incision was made. Next, a 4 Luxembourgish centesis needle was advanced into the collection. A total of 360 mL of slightly cloudy fluid was aspirated and the needle was removed. The patient tolerated the procedure well, without immediate complication. FINDINGS: See above      1. Right thoracentesis yielding 360 mL of slightly cloudy fluid. Electronically Signed: Main Mcclure MD  1/8/2024 4:37 PM EST  Workstation ID: [id number]     XR Chest 1 View     Result Date: 1/8/2024  XR CHEST 1 VW Date of Exam: 1/8/2024 4:09 PM EST Indication: s/p thora Comparison: CT chest from January 8, 2024 Findings: There is a small loculated right pleural effusion with right basilar opacity. No pneumothorax is identified. The heart and mediastinal contours appear stable. The pulmonary vascular appears normal. The osseous structures appear intact.      Impression: 1.Small loculated right pleural effusion. 2.Right basilar opacity, which could reflect atelectasis or pneumonia. 3.No pneumothorax identified. Electronically Signed: Byron Maria MD  1/8/2024 4:32 PM EST  Workstation ID: [id number]     CT Angiogram Chest     Result Date: 1/8/2024  CT ANGIOGRAM CHEST Date of Exam: 1/8/2024 12:29 PM EST Indication: chest pain, SOA, elevated d-dimer. r/o PE, infection. known IV drug user. Comparison: None available. Technique: CTA of the chest was performed after the uneventful intravenous administration of 85 mL Isovue-370. Reconstructed coronal and sagittal images were also obtained. In addition, a 3-D volume rendered image was created for interpretation. Automated exposure control and iterative reconstruction methods were used. Findings: There is relatively more dense contrast in the SVC and thoracic aorta as compared to the  pulmonary arteries and their branches. No large central embolism is identified although peripheral branches are not adequately evaluated on this exam. There are no enlarged mediastinal nodes. There are calcified hilar nodes. There is a small partially loculated right pleural effusion. There is no left pleural effusion. There is compressive atelectasis of portions of the right middle and right lower lobes. There is triangular atelectasis of the left lower lobe.      Impression: Exam is not adequate for detection of pulmonary embolism. Repeat scanning should be considered if clinically warranted. Small loculated right pleural effusion. Areas of compressive atelectasis of the right middle and right lower lobes, and mild atelectasis of the left lower lobe. Electronically Signed: Reyna Caldwell MD  1/8/2024 12:55 PM EST  Workstation ID: [id number]     XR Chest 1 View     Result Date: 1/8/2024  XR CHEST 1 VW Date of Exam: 1/8/2024 8:26 AM EST Indication: Chest Pain Triage Protocol Comparison: None available. Findings: Lung volumes are diminished and there are perihilar interstitial opacities suggesting pulmonary edema pattern. Small bilateral pleural effusions are evident, greater on the right. There is no distinct pneumothorax. The heart appears mildly enlarged.      Impression: Hypoventilatory changes and evidence of likely pulmonary edema with small bilateral pleural effusions. Electronically Signed: Gio Marte MD  1/8/2024 8:47 AM EST  Workstation ID: [id number]         Results for orders placed during the hospital encounter of 01/08/24     Duplex Venous Lower Extremity - Bilateral CAR     Interpretation Summary    Normal bilateral lower extremity venous duplex scan.        Results for orders placed during the hospital encounter of 01/08/24     Duplex Venous Lower Extremity - Bilateral CAR     Interpretation Summary    Normal bilateral lower extremity venous duplex scan.        Results for orders placed during the  hospital encounter of 01/08/24     Adult Transthoracic Echo Complete w/ Color, Spectral and Contrast if Necessary Per Protocol     Interpretation Summary    Left ventricular systolic function is normal. Calculated left ventricular EF = 57.8% Left ventricular ejection fraction appears to be 56 - 60%.    Estimated right ventricular systolic pressure from tricuspid regurgitation is normal (<35 mmHg).    Mild MR        Plan for Follow-up of Pending Labs/Results: He will follow up in infectious disease clinic  Pending Labs         Order Current Status     AFB Culture - Body Fluid, Pleural Cavity Preliminary result     AFB Culture - Tissue, Pleural Cavity Preliminary result     Anaerobic Culture - Tissue, Pleural Cavity Preliminary result     Fungus Culture - Body Fluid, Pleural Cavity Preliminary result             Discharge Details          Discharge Medications          New Medications         Instructions Start Date   acetaminophen 500 MG tablet  Commonly known as: TYLENOL    1,000 mg, Oral, Every 8 Hours Scheduled        amitriptyline 25 MG tablet  Commonly known as: ELAVIL    25 mg, Oral, Nightly        Anti-Diarrheal 2 MG tablet  Generic drug: loperamide    2 mg, Oral, 4 Times Daily PRN        cefuroxime 500 MG tablet  Commonly known as: CEFTIN    500 mg, Oral, 2 Times Daily        cyclobenzaprine 5 MG tablet  Commonly known as: FLEXERIL    10 mg, Oral, 3 Times Daily PRN        gabapentin 100 MG capsule  Commonly known as: NEURONTIN    Take 3 capsules by mouth 3 (Three) Times a Day for 3 days, THEN 1 capsule 3 (Three) Times a Day for 3 days, THEN 1 capsule Every Night for 3 days.    Start Date: January 17, 2024      hydrOXYzine 25 MG tablet  Commonly known as: ATARAX    25 mg, Oral, Every 8 Hours PRN        melatonin 5 MG tablet tablet    10 mg, Oral, Nightly PRN        metroNIDAZOLE 500 MG tablet  Commonly known as: FLAGYL    500 mg, Oral, Every 8 Hours Scheduled        sulfamethoxazole-trimethoprim 800-160 MG per  tablet  Commonly known as: Bactrim DS    1 tablet, Oral, 2 Times Daily                  Continue These Medications         Instructions Start Date   lidocaine 4 % cream  Commonly known as: LMX    1 application , Topical, As Needed        methadone 10 MG tablet  Commonly known as: DOLOPHINE    90 mg, Oral, Daily        naproxen 500 MG tablet  Commonly known as: NAPROSYN    500 mg, Oral, 2 Times Daily With Meals                     No Known Allergies        Discharge Disposition:  Home or Self Care     Diet:  Hospital:      Diet Order   Procedures    Diet: Regular/House Diet; Texture: Regular Texture (IDDSI 7); Fluid Consistency: Thin (IDDSI 0)              CODE STATUS:        Code Status and Medical Interventions:   Ordered at: 01/11/24 1253     Code Status (Patient has no pulse and is not breathing):     CPR (Attempt to Resuscitate)     Medical Interventions (Patient has pulse or is breathing):     Full Support                Future Appointments   Date Time Provider Department Center   1/31/2024  9:30 AM Lakeisha Ny APRN [facility]         Additional Instructions for the Follow-ups that You Need to Schedule         Discharge Follow-up with PCP   As directed         Currently Documented PCP:    Provider, No Known    PCP Phone Number:    None      Follow Up Details: 1 week           Reports still with some soa with exertion.    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         The following portions of the patient's history were reviewed and updated as appropriate: allergies, current medications, past family history, past medical history, past social history, past surgical history, and problem list.    Review of Systems   Constitutional:  Negative for chills and fever.   Respiratory:  Positive for shortness of breath (with exertion-some).    Gastrointestinal:  Negative for nausea and vomiting.        Some upper abdominal soreness.     Neurological:  Negative for seizures and syncope.    Psychiatric/Behavioral:  Negative for suicidal ideas.        Objective   Physical Exam  Vitals and nursing note reviewed.   Constitutional:       Appearance: Normal appearance.   HENT:      Head: Normocephalic and atraumatic.   Neck:      Vascular: No carotid bruit.   Cardiovascular:      Rate and Rhythm: Normal rate and regular rhythm.      Heart sounds: Normal heart sounds. No murmur heard.  Pulmonary:      Effort: Pulmonary effort is normal.      Breath sounds: Normal breath sounds.   Abdominal:      General: Bowel sounds are normal.      Palpations: Abdomen is soft. There is no mass.      Tenderness: There is no abdominal tenderness.   Musculoskeletal:        Arms:       Cervical back: Neck supple.      Right lower leg: No edema.      Left lower leg: No edema.   Skin:     Coloration: Skin is not jaundiced or pale.      Findings: No erythema.   Neurological:      Mental Status: He is alert. Mental status is at baseline.   Psychiatric:         Mood and Affect: Mood normal.         Behavior: Behavior normal.       Body mass index is 47.58 kg/m².    Class 3 Severe Obesity (BMI >=40). Obesity-related health conditions include the following: GERD. Obesity is worsening. BMI is is above average; BMI management plan is completed. We discussed portion control and increasing exercise.      Assessment & Plan     1. Hospital discharge follow-up      2. Gastroesophageal reflux disease, unspecified whether esophagitis present  Start Pepcid 20 mg twice daily.  - famotidine (Pepcid) 20 MG tablet; Take 1 tablet by mouth 2 (Two) Times a Day.  Dispense: 60 tablet; Refill: 1    3. Opioid dependence on agonist therapy  Encourage patient to continue to not use drugs.  To continue to follow with methadone clinic.      4. Loculated pleural effusion  To keep appointment with infectious disease upcoming, as well as, appointment with cardiothoracic surgeon upcoming.    5. Pleural effusion  To keep appointment upcoming with surgeon.    6.  BMI 45.0-49.9, adult  Patient to watch portion control make good food choices.  Also encouraged regular exercise as tolerated.          Part of this note may be an electronic transcription/translation of spoken language to printed text using the Dragon Dictation System.

## 2024-01-24 NOTE — PATIENT INSTRUCTIONS
Continue with to follow with Methadone clinic.  Keep appt with surgeon and Infectious Disease.  Start Pepcid up to twice daily.  Follow-up in about 3 weeks.  No more IV drug use.

## 2024-01-25 LAB
MYCOBACTERIUM SPEC CULT: NORMAL
NIGHT BLUE STAIN TISS: NORMAL

## 2024-01-26 ENCOUNTER — READMISSION MANAGEMENT (OUTPATIENT)
Dept: CALL CENTER | Facility: HOSPITAL | Age: 34
End: 2024-01-26
Payer: COMMERCIAL

## 2024-01-26 NOTE — OUTREACH NOTE
CT Surgery Week 2 Survey      Flowsheet Row Responses   [facility] facility patient discharged from? [facility]   Does the patient have one of the following disease processes/diagnoses(primary or secondary)? Cardiothoracic surgery   Week 2 attempt successful? No   Unsuccessful attempts Attempt 1            Lexii VASQUEZ - Registered Nurse

## 2024-01-30 NOTE — PROGRESS NOTES
.     [facility] Cardiothoracic Surgery Office Follow Up Note    Date of Encounter: 2024     Name: Lion Solis  : 1990     Referred By: No ref. provider found  PCP: Pritesh Rendon DO    Chief Complaint:    Chief Complaint   Patient presents with    [facility] follow up s/p right thoracotomy with decortication 24.       Subjective      History of Present Illness:    It was nice to see Lion Solis in follow up.  He is a pleasant 33 y.o. male with PMH significant for IV substance disorder on chronic methadone (inpatient UDS + for methadone, fentanyl, and opiate), current smoker (e-cigarette), MRSA, and loculated pleural effusion.      Patient is s/p right thoracotomy with total complete decortication by Dr. Vargas on 2024.  See op report for full details.  Hospital course was overall uncomplicated.  Patient was followed by Infectious Disease.  On postoperative day #7 patient met required criteria and was deemed appropriate for discharge home, without readmission.  He was continued on his home methadone and prescribed gabapentin along with naproxen.  Mr. Solis presents today for initial postop follow up.  He denies fever or chills.  Patient does report dyspnea on exertion.  He is positive for cough with green sputum production.  Patient remains on antibiotic therapy and has follow up scheduled with Infection Disease.      Review of Systems:  Review of Systems   Constitutional: Negative. Negative for chills, decreased appetite, diaphoresis, fever, malaise/fatigue, night sweats and weight loss.   HENT: Negative.  Negative for congestion, hoarse voice, sore throat and stridor.    Cardiovascular:  Positive for dyspnea on exertion. Negative for chest pain, claudication, irregular heartbeat, leg swelling, near-syncope, orthopnea, palpitations, paroxysmal nocturnal dyspnea and syncope.   Respiratory:  Positive for cough and sputum production. Negative for hemoptysis, shortness of  breath, sleep disturbances due to breathing, snoring and wheezing.    Hematologic/Lymphatic: Negative.  Negative for adenopathy and bleeding problem. Does not bruise/bleed easily.   Skin: Negative.  Negative for color change, dry skin, itching, poor wound healing and rash.   Musculoskeletal: Negative.  Negative for arthritis, back pain, falls and muscle weakness.   Gastrointestinal: Negative.  Negative for abdominal pain, anorexia, constipation, diarrhea, hematochezia, melena, nausea and vomiting.   Neurological:  Positive for numbness. Negative for difficulty with concentration, disturbances in coordination, dizziness, loss of balance, seizures, vertigo and weakness.   Psychiatric/Behavioral: Negative.  Negative for altered mental status, depression, memory loss and substance abuse. The patient does not have insomnia and is not nervous/anxious.    Allergic/Immunologic: Negative.  Negative for persistent infections.       I have reviewed the following portions of the patient's history: problem list, current medications, allergies, past surgical history, past medical history, past social history, past family history, and ROS and confirm it's accurate.    Allergies:  No Known Allergies    Medications:      Current Outpatient Medications:     acetaminophen (TYLENOL) 500 MG tablet, Take 2 tablets by mouth Every 8 (Eight) Hours., Disp: , Rfl:     amitriptyline (ELAVIL) 25 MG tablet, Take 1 tablet by mouth Every Night., Disp: 30 tablet, Rfl: 1    cefuroxime (CEFTIN) 500 MG tablet, Take 1 tablet by mouth 2 (Two) Times a Day for 14 days., Disp: 28 tablet, Rfl: 0    cyclobenzaprine (FLEXERIL) 5 MG tablet, Take 2 tablets by mouth 3 (Three) Times a Day As Needed for Muscle Spasms., Disp: 30 tablet, Rfl: 0    famotidine (Pepcid) 20 MG tablet, Take 1 tablet by mouth 2 (Two) Times a Day., Disp: 60 tablet, Rfl: 1    hydrOXYzine (ATARAX) 25 MG tablet, Take 1 tablet by mouth Every 8 (Eight) Hours As Needed for Anxiety., Disp: 90  tablet, Rfl: 1    loperamide (Imodium A-D) 2 MG tablet, Take 1 tablet by mouth 4 (Four) Times a Day As Needed for Diarrhea., Disp: 24 tablet, Rfl: 0    methadone (DOLOPHINE) 10 MG tablet, Take 9 tablets by mouth Daily,, Disp: , Rfl:     metroNIDAZOLE (FLAGYL) 500 MG tablet, Take 1 tablet by mouth Every 8 (Eight) Hours for 14 days. Indications: Pneumonia, possible empyema, Disp: 42 tablet, Rfl: 0    naproxen (NAPROSYN) 500 MG tablet, Take 1 tablet by mouth 2 (Two) Times a Day With Meals for 14 days., Disp: 28 tablet, Rfl: 0    sulfamethoxazole-trimethoprim (Bactrim DS) 800-160 MG per tablet, Take 1 tablet by mouth 2 (Two) Times a Day for 14 days., Disp: 28 tablet, Rfl: 0    gabapentin (NEURONTIN) 100 MG capsule, Take 3 capsules by mouth 3 (Three) Times a Day for 3 days, THEN 1 capsule 3 (Three) Times a Day for 3 days, THEN 1 capsule Every Night for 3 days., Disp: 39 capsule, Rfl: 0    History:   Past Medical History:   Diagnosis Date    MRSA infection        Past Surgical History:   Procedure Laterality Date    BRONCHOSCOPY THORACOTOMY Right 1/11/2024    Procedure: THORACOTOMY WITH DECORTICATION;  Surgeon: Douglas Vargas MD;  Location: [facility];  Service: Cardiothoracic;  Laterality: Right;       Social History     Socioeconomic History    Marital status:     Number of children: 4   Tobacco Use    Smoking status: Every Day     Types: Electronic Cigarette    Smokeless tobacco: Never   Vaping Use    Vaping Use: Every day    Substances: Flavoring    Devices: Pre-filled pod   Substance and Sexual Activity    Alcohol use: Never    Drug use: Yes     Comment: fentanyl    Sexual activity: Yes     Partners: Female        Family History   Problem Relation Age of Onset    No Known Problems Mother     Heart attack Father     Diabetes Father        Objective     Physical Exam:  Vitals:    01/31/24 0937   BP: 126/89   BP Location: Right arm   Patient Position: Sitting   Pulse: 97   Temp: 97.7 °F (36.5 °C)   SpO2: 98%  "  Weight: (!) 146 kg (322 lb)   Height: 175.3 cm (69\")  Comment: patient reports      Body mass index is 47.55 kg/m².    Physical Exam  Vitals reviewed.   Constitutional:       General: He is not in acute distress.     Appearance: Normal appearance. He is not ill-appearing.   Eyes:      Pupils: Pupils are equal, round, and reactive to light.   Cardiovascular:      Rate and Rhythm: Normal rate and regular rhythm.      Heart sounds: No murmur heard.  Pulmonary:      Effort: Pulmonary effort is normal.      Breath sounds: Examination of the right-lower field reveals decreased breath sounds and rhonchi.   Skin:     General: Skin is warm and dry.   Neurological:      General: No focal deficit present.      Mental Status: He is alert and oriented to person, place, and time.   Psychiatric:         Mood and Affect: Mood normal.         Behavior: Behavior normal.         Thought Content: Thought content normal.         Judgment: Judgment normal.         Imaging/Labs:  XR chest 2 view  Awaiting final report.  XR Chest 2 View (01/31/2024 09:51)     ..I personally reviewed this report and imaging.    Assessment / Plan      Assessment / Plan:  PMH significant for IV substance disorder on chronic methadone (inpatient UDS + for methadone, fentanyl, and opiate), current smoker (e-cigarette), MRSA, and loculated pleural effusion.      1.  Loculated pleural effusion  S/p right thoracotomy with total complete decortication by Dr. Vargas on 1/11/2024.  Uncomplicated hospital course.  Followed by infectious disease.  Deemed appropriate for discharge home on POD #7, without readmission.  Continued on home methadone and prescribed gabapentin along with naproxen for breakthrough pain.  Presents today for initial postop follow-up.  Denies fever or chills.  Reports dyspnea on exertion.  Positive for cough with green sputum production.  Remains on antibiotic therapy and has follow-up scheduled with ID.  Right thoracotomy incision with " minimal scabbing present; absent of erythema, dehiscence, or drainage.  Encouraged continued use of incentive spirometer.      Patient Education:  Continue to keep your incisions clean and dry.  Use plain antibacterial soap (i.e. dial) and water to clean and pat dry.    Do no apply any lotions, creams, oils, powders, salves, or ointments directly to incisional sites.  Please watch for signs and symptoms of infection which may include but are not limited to: increased pain or tenderness around your incision, warmth at the site or fever, redness or red streaking, and foul smelling or appearing drainage.  If your incision opens up please contact our office.    Instructions and post-operative restrictions verbally reviewed -- patient verbalized understanding.    Follow Up:   We will follow on an as-needed basis.  Patient encouraged to call the office with any questions or concerns.     Thank you for allowing me to participate in your care.  Best Regards,    FARRAH Hunt  [facility] Cardiothoracic Surgery  01/31/24  10:09 EST

## 2024-01-31 ENCOUNTER — OFFICE VISIT (OUTPATIENT)
Dept: CARDIAC SURGERY | Facility: CLINIC | Age: 34
End: 2024-01-31
Payer: COMMERCIAL

## 2024-01-31 ENCOUNTER — READMISSION MANAGEMENT (OUTPATIENT)
Dept: CALL CENTER | Facility: HOSPITAL | Age: 34
End: 2024-01-31
Payer: COMMERCIAL

## 2024-01-31 VITALS
WEIGHT: 315 LBS | HEIGHT: 69 IN | SYSTOLIC BLOOD PRESSURE: 126 MMHG | DIASTOLIC BLOOD PRESSURE: 89 MMHG | BODY MASS INDEX: 46.65 KG/M2 | HEART RATE: 97 BPM | TEMPERATURE: 97.7 F | OXYGEN SATURATION: 98 %

## 2024-01-31 DIAGNOSIS — J90 LOCULATED PLEURAL EFFUSION: Primary | ICD-10-CM

## 2024-01-31 PROCEDURE — 1159F MED LIST DOCD IN RCRD: CPT | Performed by: REGISTERED NURSE

## 2024-01-31 PROCEDURE — 1160F RVW MEDS BY RX/DR IN RCRD: CPT | Performed by: REGISTERED NURSE

## 2024-01-31 PROCEDURE — 99024 POSTOP FOLLOW-UP VISIT: CPT | Performed by: REGISTERED NURSE

## 2024-01-31 NOTE — OUTREACH NOTE
CT Surgery Week 2 Survey      Flowsheet Row Responses   [facility] facility patient discharged from? [facility]   Does the patient have one of the following disease processes/diagnoses(primary or secondary)? Cardiothoracic surgery   Week 2 attempt successful? No   Unsuccessful attempts Attempt 2            JOAQUIN TILLMAN - Registered Nurse

## 2024-02-01 LAB
MYCOBACTERIUM SPEC CULT: NORMAL
NIGHT BLUE STAIN TISS: NORMAL

## 2024-02-08 LAB
MYCOBACTERIUM SPEC CULT: NORMAL
NIGHT BLUE STAIN TISS: NORMAL

## 2024-02-15 LAB
MYCOBACTERIUM SPEC CULT: NORMAL
NIGHT BLUE STAIN TISS: NORMAL

## 2024-02-19 LAB
FUNGUS WND CULT: NORMAL
MYCOBACTERIUM SPEC CULT: NORMAL
NIGHT BLUE STAIN TISS: NORMAL

## 2024-02-22 LAB
MYCOBACTERIUM SPEC CULT: NORMAL
NIGHT BLUE STAIN TISS: NORMAL

## 2024-03-21 DIAGNOSIS — J90 PLEURAL EFFUSION: ICD-10-CM

## 2024-03-21 NOTE — TELEPHONE ENCOUNTER
Caller: Lion Solis    Relationship: Self    Best call back number: [phone]     Requested Prescriptions:   Requested Prescriptions     Pending Prescriptions Disp Refills    amitriptyline (ELAVIL) 25 MG tablet 30 tablet 1     Sig: Take 1 tablet by mouth Every Night.    gabapentin (NEURONTIN) 100 MG capsule 39 capsule 0     Sig: Take 3 capsules by mouth 3 (Three) Times a Day for 3 days, THEN 1 capsule 3 (Three) Times a Day for 3 days, THEN 1 capsule Every Night for 3 days.        Pharmacy where request should be sent: [facility] [id number] - [address] - [phone]  - [phone] FX     Last office visit with prescribing clinician: 1/24/2024   Last telemedicine visit with prescribing clinician: Visit date not found   Next office visit with prescribing clinician: 3/26/2024     Additional details provided by patient: 2 DAYS LEFT    Does the patient have less than a 3 day supply:  [x] Yes  [] No    Would you like a call back once the refill request has been completed: [] Yes [x] No    If the office needs to give you a call back, can they leave a voicemail: [] Yes [x] No    Julian Forrester, PCT   03/21/24 14:25 EDT

## 2024-03-22 RX ORDER — AMITRIPTYLINE HYDROCHLORIDE 25 MG/1
25 TABLET, FILM COATED ORAL NIGHTLY
Qty: 30 TABLET | Refills: 1 | Status: CANCELLED | OUTPATIENT
Start: 2024-03-22

## 2024-03-22 RX ORDER — AMITRIPTYLINE HYDROCHLORIDE 25 MG/1
25 TABLET, FILM COATED ORAL NIGHTLY
Qty: 30 TABLET | Refills: 1 | Status: SHIPPED | OUTPATIENT
Start: 2024-03-22

## 2024-03-22 NOTE — TELEPHONE ENCOUNTER
Dr. Rendon has not prescribed the gabapentin and he did not mention in his note that he was going to, no CSA in chart for this, so I will send in the elavil but the gabapentin will need to wait for Dr. Rendon.

## 2024-03-22 NOTE — TELEPHONE ENCOUNTER
Rx Refill Note  Requested Prescriptions     Pending Prescriptions Disp Refills    amitriptyline (ELAVIL) 25 MG tablet 30 tablet 1     Sig: Take 1 tablet by mouth Every Night.    gabapentin (NEURONTIN) 100 MG capsule 39 capsule 0     Sig: Take 3 capsules by mouth 3 (Three) Times a Day for 3 days, THEN 1 capsule 3 (Three) Times a Day for 3 days, THEN 1 capsule Every Night for 3 days.      Last office visit with prescribing clinician: 1/24/2024   Last telemedicine visit with prescribing clinician: Visit date not found   Next office visit with prescribing clinician: 3/26/2024                         Would you like a call back once the refill request has been completed: [] Yes [] No    If the office needs to give you a call back, can they leave a voicemail: [] Yes [] No    Krystal Paulson MA  03/22/24, 09:34 EDT

## 2024-03-24 RX ORDER — GABAPENTIN 100 MG/1
CAPSULE ORAL
Qty: 39 CAPSULE | Refills: 0 | OUTPATIENT
Start: 2024-03-24 | End: 2024-04-01

## 2025-03-26 ENCOUNTER — TELEPHONE (OUTPATIENT)
Dept: FAMILY MEDICINE CLINIC | Facility: CLINIC | Age: 35
End: 2025-03-26

## 2025-03-28 ENCOUNTER — TELEPHONE (OUTPATIENT)
Dept: FAMILY MEDICINE CLINIC | Facility: CLINIC | Age: 35
End: 2025-03-28

## 2025-07-10 NOTE — ANESTHESIA PROCEDURE NOTES
Goal Outcome Evaluation:       Patient vital signs are stable.  Patient is up ad sana and independent with cares.  Pain is controlled with PO medications.  IDRIS drain teaching done with teach back from patient.  Discharge medications given to patient and questions answered  Discharge paperwork reviewed with patient and questions answered.  AVS faxed to [facility] from [facility] to [phone]                      Peripheral IV    Start time: 1/11/2024 12:57 PM  Line placed for Fluids/Medication Admin and Difficult Access.  Performed By   Anesthesiologist: Bernice Hale MD  Preanesthetic Checklist  Completed: patient identified, IV checked, site marked, risks and benefits discussed, surgical consent, monitors and equipment checked, pre-op evaluation and timeout performed  Peripheral IV Prep   Patient position: supine   Prep: alcohol swabs  Patient monitoring: heart rate, cardiac monitor and continuous pulse ox  Peripheral IV Procedure   Laterality:left  Location:  Arm  Catheter size: 22 G          Post Assessment   Dressing Type: tape and transparent.    IV Dressing/Site: clean, dry and intact

## (undated) DEVICE — ELECTRD BLD EZ CLN STD 6.5IN

## (undated) DEVICE — ELECTRD BLD EZ CLN STD 4IN

## (undated) DEVICE — ELECTRD BLD EZ CLN STD 2.5IN

## (undated) DEVICE — DRAPE,INSTRUMENT,MAGNETIC,10X16: Brand: MEDLINE

## (undated) DEVICE — PENCL ROCKRSWCH MEGADYNE W/HOLSTR 10FT SS

## (undated) DEVICE — 3M™ MEDIPORE™ H SOFT CLOTH SURGICAL TAPE, 2863, 3 IN X 10 YD, 12/CASE: Brand: 3M™ MEDIPORE™

## (undated) DEVICE — DECANTER BAG 9": Brand: MEDLINE INDUSTRIES, INC.

## (undated) DEVICE — TRAP FLD MINIVAC MEGADYNE 100ML

## (undated) DEVICE — SUT VIC 2 TP1 54IN J880T

## (undated) DEVICE — PATIENT RETURN ELECTRODE, SINGLE-USE, CONTACT QUALITY MONITORING, ADULT, WITH 9FT CORD, FOR PATIENTS WEIGING OVER 33LBS. (15KG): Brand: MEGADYNE

## (undated) DEVICE — DISH PETRI 3.5IN MD STRL LF

## (undated) DEVICE — GLV SURG BIOGEL LTX PF 8

## (undated) DEVICE — PK THORACOTOMY 10

## (undated) DEVICE — SPNG GZ WOVN 4X4IN 12PLY 10/BX STRL

## (undated) DEVICE — STERILE PVP: Brand: MEDLINE INDUSTRIES, INC.

## (undated) DEVICE — GLV SURG BIOGEL LTX PF 7 1/2

## (undated) DEVICE — TBG PENCL TELESCP MEGADYNE SMOKE EVAC 10FT